# Patient Record
Sex: MALE | Race: BLACK OR AFRICAN AMERICAN | Employment: OTHER
[De-identification: names, ages, dates, MRNs, and addresses within clinical notes are randomized per-mention and may not be internally consistent; named-entity substitution may affect disease eponyms.]

---

## 2023-05-04 ENCOUNTER — APPOINTMENT (OUTPATIENT)
Facility: HOSPITAL | Age: 73
End: 2023-05-04
Payer: MEDICARE

## 2023-05-04 ENCOUNTER — HOSPITAL ENCOUNTER (INPATIENT)
Facility: HOSPITAL | Age: 73
LOS: 8 days | Discharge: NURSING, REHAB OR LONG-TERM CARE | End: 2023-05-12
Attending: EMERGENCY MEDICINE | Admitting: INTERNAL MEDICINE
Payer: MEDICARE

## 2023-05-04 DIAGNOSIS — I26.99 BILATERAL PULMONARY EMBOLISM (HCC): Primary | ICD-10-CM

## 2023-05-04 DIAGNOSIS — K57.32 DIVERTICULITIS OF COLON: ICD-10-CM

## 2023-05-04 DIAGNOSIS — N49.3 FOURNIER'S GANGRENE OF SCROTUM: ICD-10-CM

## 2023-05-04 DIAGNOSIS — D63.8 ANEMIA OF CHRONIC DISEASE: ICD-10-CM

## 2023-05-04 DIAGNOSIS — R77.8 ELEVATED TROPONIN I LEVEL: ICD-10-CM

## 2023-05-04 DIAGNOSIS — I48.20 CHRONIC ATRIAL FIBRILLATION (HCC): ICD-10-CM

## 2023-05-04 PROBLEM — B19.20 HEPATITIS C: Status: ACTIVE | Noted: 2023-05-04

## 2023-05-04 PROBLEM — N17.9 ACUTE KIDNEY INJURY SUPERIMPOSED ON CHRONIC KIDNEY DISEASE (HCC): Status: ACTIVE | Noted: 2023-05-04

## 2023-05-04 PROBLEM — K57.92 DIVERTICULITIS: Status: ACTIVE | Noted: 2023-05-04

## 2023-05-04 PROBLEM — N18.9 ACUTE KIDNEY INJURY SUPERIMPOSED ON CHRONIC KIDNEY DISEASE (HCC): Status: ACTIVE | Noted: 2023-05-04

## 2023-05-04 PROBLEM — E11.9 DIABETES MELLITUS, TYPE 2 (HCC): Status: ACTIVE | Noted: 2023-05-04

## 2023-05-04 PROBLEM — G47.33 OSA ON CPAP: Status: ACTIVE | Noted: 2023-05-04

## 2023-05-04 PROBLEM — N40.0 BPH (BENIGN PROSTATIC HYPERPLASIA): Status: ACTIVE | Noted: 2023-05-04

## 2023-05-04 PROBLEM — N18.9 CKD (CHRONIC KIDNEY DISEASE): Status: ACTIVE | Noted: 2023-05-04

## 2023-05-04 PROBLEM — D62 ABLA (ACUTE BLOOD LOSS ANEMIA): Status: ACTIVE | Noted: 2023-05-04

## 2023-05-04 PROBLEM — Z99.89 OSA ON CPAP: Status: ACTIVE | Noted: 2023-05-04

## 2023-05-04 PROBLEM — E78.5 HYPERLIPIDEMIA: Status: ACTIVE | Noted: 2023-05-04

## 2023-05-04 PROBLEM — I10 HYPERTENSION: Status: ACTIVE | Noted: 2023-05-04

## 2023-05-04 PROBLEM — I48.0 PAROXYSMAL ATRIAL FIBRILLATION (HCC): Status: ACTIVE | Noted: 2023-05-04

## 2023-05-04 PROBLEM — Z87.438 HISTORY OF FOURNIER'S GANGRENE: Status: ACTIVE | Noted: 2023-05-04

## 2023-05-04 LAB
ALBUMIN SERPL-MCNC: 2 G/DL (ref 3.4–5)
ALBUMIN/GLOB SERPL: 0.5 (ref 0.8–1.7)
ALP SERPL-CCNC: 62 U/L (ref 45–117)
ALT SERPL-CCNC: 19 U/L (ref 16–61)
ANION GAP SERPL CALC-SCNC: 6 MMOL/L (ref 3–18)
AST SERPL-CCNC: 22 U/L (ref 10–38)
BASOPHILS # BLD: 0 K/UL (ref 0–0.1)
BASOPHILS NFR BLD: 0 % (ref 0–2)
BILIRUB SERPL-MCNC: 0.5 MG/DL (ref 0.2–1)
BUN SERPL-MCNC: 18 MG/DL (ref 7–18)
BUN/CREAT SERPL: 11 (ref 12–20)
CALCIUM SERPL-MCNC: 8.1 MG/DL (ref 8.5–10.1)
CHLORIDE SERPL-SCNC: 112 MMOL/L (ref 100–111)
CO2 SERPL-SCNC: 21 MMOL/L (ref 21–32)
CREAT SERPL-MCNC: 1.71 MG/DL (ref 0.6–1.3)
DIFFERENTIAL METHOD BLD: ABNORMAL
EKG ATRIAL RATE: 79 BPM
EKG DIAGNOSIS: NORMAL
EKG P AXIS: 86 DEGREES
EKG P-R INTERVAL: 206 MS
EKG Q-T INTERVAL: 382 MS
EKG QRS DURATION: 102 MS
EKG QTC CALCULATION (BAZETT): 438 MS
EKG R AXIS: -43 DEGREES
EKG T AXIS: 43 DEGREES
EKG VENTRICULAR RATE: 79 BPM
EOSINOPHIL # BLD: 0.2 K/UL (ref 0–0.4)
EOSINOPHIL NFR BLD: 3 % (ref 0–5)
ERYTHROCYTE [DISTWIDTH] IN BLOOD BY AUTOMATED COUNT: 17.3 % (ref 11.6–14.5)
EST. AVERAGE GLUCOSE BLD GHB EST-MCNC: 126 MG/DL
GLOBULIN SER CALC-MCNC: 3.8 G/DL (ref 2–4)
GLUCOSE BLD STRIP.AUTO-MCNC: 159 MG/DL (ref 70–110)
GLUCOSE SERPL-MCNC: 206 MG/DL (ref 74–99)
HBA1C MFR BLD: 6 % (ref 4.2–5.6)
HCT VFR BLD AUTO: 22.5 % (ref 36–48)
HCT VFR BLD AUTO: 23 % (ref 36–48)
HGB BLD-MCNC: 6.6 G/DL (ref 13–16)
HGB BLD-MCNC: 7.1 G/DL (ref 13–16)
HISTORY CHECK: NORMAL
IMM GRANULOCYTES # BLD AUTO: 0.1 K/UL (ref 0–0.04)
IMM GRANULOCYTES NFR BLD AUTO: 1 % (ref 0–0.5)
INR PPP: 1.5 (ref 0.8–1.2)
LACTATE SERPL-SCNC: 1.5 MMOL/L (ref 0.4–2)
LIPASE SERPL-CCNC: 110 U/L (ref 73–393)
LYMPHOCYTES # BLD: 0.8 K/UL (ref 0.9–3.6)
LYMPHOCYTES NFR BLD: 10 % (ref 21–52)
MAGNESIUM SERPL-MCNC: 1.9 MG/DL (ref 1.6–2.6)
MCH RBC QN AUTO: 29.3 PG (ref 24–34)
MCHC RBC AUTO-ENTMCNC: 30.9 G/DL (ref 31–37)
MCV RBC AUTO: 95 FL (ref 78–100)
MONOCYTES # BLD: 0.6 K/UL (ref 0.05–1.2)
MONOCYTES NFR BLD: 8 % (ref 3–10)
NEUTS SEG # BLD: 5.7 K/UL (ref 1.8–8)
NEUTS SEG NFR BLD: 78 % (ref 40–73)
NRBC # BLD: 0 K/UL (ref 0–0.01)
NRBC BLD-RTO: 0 PER 100 WBC
NT PRO BNP: 277 PG/ML (ref 0–900)
NT PRO BNP: 630 PG/ML (ref 0–900)
PLATELET # BLD AUTO: 207 K/UL (ref 135–420)
PMV BLD AUTO: 10.6 FL (ref 9.2–11.8)
POTASSIUM SERPL-SCNC: 4.3 MMOL/L (ref 3.5–5.5)
PROT SERPL-MCNC: 5.8 G/DL (ref 6.4–8.2)
PROTHROMBIN TIME: 18.2 SEC (ref 11.5–15.2)
RBC # BLD AUTO: 2.42 M/UL (ref 4.35–5.65)
SODIUM SERPL-SCNC: 139 MMOL/L (ref 136–145)
TROPONIN I SERPL HS-MCNC: 74 NG/L (ref 0–78)
TROPONIN I SERPL HS-MCNC: 95 NG/L (ref 0–78)
TSH SERPL DL<=0.05 MIU/L-ACNC: 1.39 UIU/ML (ref 0.36–3.74)
WBC # BLD AUTO: 7.3 K/UL (ref 4.6–13.2)

## 2023-05-04 PROCEDURE — 71045 X-RAY EXAM CHEST 1 VIEW: CPT

## 2023-05-04 PROCEDURE — 80053 COMPREHEN METABOLIC PANEL: CPT

## 2023-05-04 PROCEDURE — 84443 ASSAY THYROID STIM HORMONE: CPT

## 2023-05-04 PROCEDURE — 86923 COMPATIBILITY TEST ELECTRIC: CPT

## 2023-05-04 PROCEDURE — 93005 ELECTROCARDIOGRAM TRACING: CPT | Performed by: EMERGENCY MEDICINE

## 2023-05-04 PROCEDURE — 83690 ASSAY OF LIPASE: CPT

## 2023-05-04 PROCEDURE — 93010 ELECTROCARDIOGRAM REPORT: CPT | Performed by: INTERNAL MEDICINE

## 2023-05-04 PROCEDURE — 86900 BLOOD TYPING SEROLOGIC ABO: CPT

## 2023-05-04 PROCEDURE — 99223 1ST HOSP IP/OBS HIGH 75: CPT

## 2023-05-04 PROCEDURE — 83605 ASSAY OF LACTIC ACID: CPT

## 2023-05-04 PROCEDURE — 96365 THER/PROPH/DIAG IV INF INIT: CPT

## 2023-05-04 PROCEDURE — 30233N1 TRANSFUSION OF NONAUTOLOGOUS RED BLOOD CELLS INTO PERIPHERAL VEIN, PERCUTANEOUS APPROACH: ICD-10-PCS | Performed by: HOSPITALIST

## 2023-05-04 PROCEDURE — 82962 GLUCOSE BLOOD TEST: CPT

## 2023-05-04 PROCEDURE — 86850 RBC ANTIBODY SCREEN: CPT

## 2023-05-04 PROCEDURE — 83880 ASSAY OF NATRIURETIC PEPTIDE: CPT

## 2023-05-04 PROCEDURE — 1100000000 HC RM PRIVATE

## 2023-05-04 PROCEDURE — 85018 HEMOGLOBIN: CPT

## 2023-05-04 PROCEDURE — 36415 COLL VENOUS BLD VENIPUNCTURE: CPT

## 2023-05-04 PROCEDURE — 83735 ASSAY OF MAGNESIUM: CPT

## 2023-05-04 PROCEDURE — 74174 CTA ABD&PLVS W/CONTRAST: CPT

## 2023-05-04 PROCEDURE — 2580000003 HC RX 258

## 2023-05-04 PROCEDURE — 83036 HEMOGLOBIN GLYCOSYLATED A1C: CPT

## 2023-05-04 PROCEDURE — 86901 BLOOD TYPING SEROLOGIC RH(D): CPT

## 2023-05-04 PROCEDURE — 2500000003 HC RX 250 WO HCPCS: Performed by: EMERGENCY MEDICINE

## 2023-05-04 PROCEDURE — 84484 ASSAY OF TROPONIN QUANT: CPT

## 2023-05-04 PROCEDURE — 2580000003 HC RX 258: Performed by: EMERGENCY MEDICINE

## 2023-05-04 PROCEDURE — 6360000004 HC RX CONTRAST MEDICATION: Performed by: EMERGENCY MEDICINE

## 2023-05-04 PROCEDURE — 85025 COMPLETE CBC W/AUTO DIFF WBC: CPT

## 2023-05-04 PROCEDURE — 85610 PROTHROMBIN TIME: CPT

## 2023-05-04 PROCEDURE — 99285 EMERGENCY DEPT VISIT HI MDM: CPT

## 2023-05-04 PROCEDURE — 6370000000 HC RX 637 (ALT 250 FOR IP)

## 2023-05-04 PROCEDURE — 85014 HEMATOCRIT: CPT

## 2023-05-04 RX ORDER — SODIUM CHLORIDE 0.9 % (FLUSH) 0.9 %
5-40 SYRINGE (ML) INJECTION EVERY 12 HOURS SCHEDULED
Status: DISCONTINUED | OUTPATIENT
Start: 2023-05-04 | End: 2023-05-13 | Stop reason: HOSPADM

## 2023-05-04 RX ORDER — ONDANSETRON 2 MG/ML
4 INJECTION INTRAMUSCULAR; INTRAVENOUS EVERY 6 HOURS PRN
Status: DISCONTINUED | OUTPATIENT
Start: 2023-05-04 | End: 2023-05-13 | Stop reason: HOSPADM

## 2023-05-04 RX ORDER — ONDANSETRON 4 MG/1
4 TABLET, ORALLY DISINTEGRATING ORAL EVERY 8 HOURS PRN
Status: DISCONTINUED | OUTPATIENT
Start: 2023-05-04 | End: 2023-05-13 | Stop reason: HOSPADM

## 2023-05-04 RX ORDER — INSULIN LISPRO 100 [IU]/ML
15 INJECTION, SOLUTION INTRAVENOUS; SUBCUTANEOUS
COMMUNITY
Start: 2023-04-28

## 2023-05-04 RX ORDER — TAMSULOSIN HYDROCHLORIDE 0.4 MG/1
0.4 CAPSULE ORAL DAILY
Status: DISCONTINUED | OUTPATIENT
Start: 2023-05-04 | End: 2023-05-13 | Stop reason: HOSPADM

## 2023-05-04 RX ORDER — FAMOTIDINE 20 MG/1
10 TABLET, FILM COATED ORAL 2 TIMES DAILY
Status: DISCONTINUED | OUTPATIENT
Start: 2023-05-04 | End: 2023-05-04

## 2023-05-04 RX ORDER — PIPERACILLIN SODIUM, TAZOBACTAM SODIUM 2; .25 G/10ML; G/10ML
3375 INJECTION, POWDER, LYOPHILIZED, FOR SOLUTION INTRAVENOUS EVERY 8 HOURS
Qty: 68 EACH | Refills: 0 | Status: ON HOLD | COMMUNITY
Start: 2023-04-27 | End: 2023-05-12 | Stop reason: HOSPADM

## 2023-05-04 RX ORDER — INSULIN GLARGINE 100 [IU]/ML
30 INJECTION, SOLUTION SUBCUTANEOUS NIGHTLY
Status: DISCONTINUED | OUTPATIENT
Start: 2023-05-04 | End: 2023-05-05

## 2023-05-04 RX ORDER — INSULIN LISPRO 100 [IU]/ML
0-8 INJECTION, SOLUTION INTRAVENOUS; SUBCUTANEOUS
Status: DISCONTINUED | OUTPATIENT
Start: 2023-05-04 | End: 2023-05-13 | Stop reason: HOSPADM

## 2023-05-04 RX ORDER — 0.9 % SODIUM CHLORIDE 0.9 %
1000 INTRAVENOUS SOLUTION INTRAVENOUS ONCE
Status: COMPLETED | OUTPATIENT
Start: 2023-05-04 | End: 2023-05-04

## 2023-05-04 RX ORDER — METRONIDAZOLE 500 MG/100ML
500 INJECTION, SOLUTION INTRAVENOUS
Status: COMPLETED | OUTPATIENT
Start: 2023-05-04 | End: 2023-05-04

## 2023-05-04 RX ORDER — ASPIRIN 81 MG/1
81 TABLET ORAL DAILY
Status: ON HOLD | COMMUNITY
End: 2023-05-12 | Stop reason: HOSPADM

## 2023-05-04 RX ORDER — ATORVASTATIN CALCIUM 40 MG/1
40 TABLET, FILM COATED ORAL NIGHTLY
Status: DISCONTINUED | OUTPATIENT
Start: 2023-05-04 | End: 2023-05-13 | Stop reason: HOSPADM

## 2023-05-04 RX ORDER — ACETAMINOPHEN 650 MG/1
650 SUPPOSITORY RECTAL EVERY 6 HOURS PRN
Status: DISCONTINUED | OUTPATIENT
Start: 2023-05-04 | End: 2023-05-13 | Stop reason: HOSPADM

## 2023-05-04 RX ORDER — INSULIN GLARGINE 100 [IU]/ML
45 INJECTION, SOLUTION SUBCUTANEOUS
COMMUNITY
Start: 2023-04-28

## 2023-05-04 RX ORDER — DILTIAZEM HYDROCHLORIDE 240 MG/1
240 CAPSULE, COATED, EXTENDED RELEASE ORAL DAILY
Status: DISCONTINUED | OUTPATIENT
Start: 2023-05-04 | End: 2023-05-05

## 2023-05-04 RX ORDER — TRAMADOL HYDROCHLORIDE 50 MG/1
25 TABLET ORAL 3 TIMES DAILY PRN
COMMUNITY
Start: 2023-04-28

## 2023-05-04 RX ORDER — ACETAMINOPHEN 325 MG/1
650 TABLET ORAL EVERY 6 HOURS PRN
Status: DISCONTINUED | OUTPATIENT
Start: 2023-05-04 | End: 2023-05-13 | Stop reason: HOSPADM

## 2023-05-04 RX ORDER — SODIUM CHLORIDE 9 MG/ML
INJECTION, SOLUTION INTRAVENOUS PRN
Status: DISCONTINUED | OUTPATIENT
Start: 2023-05-04 | End: 2023-05-13 | Stop reason: HOSPADM

## 2023-05-04 RX ORDER — APIXABAN 5 MG/1
5 TABLET, FILM COATED ORAL 2 TIMES DAILY
COMMUNITY
Start: 2023-02-13

## 2023-05-04 RX ORDER — OXYCODONE HYDROCHLORIDE AND ACETAMINOPHEN 5; 325 MG/1; MG/1
1 TABLET ORAL EVERY 4 HOURS PRN
Status: DISCONTINUED | OUTPATIENT
Start: 2023-05-04 | End: 2023-05-13 | Stop reason: HOSPADM

## 2023-05-04 RX ORDER — SODIUM CHLORIDE 0.9 % (FLUSH) 0.9 %
5-40 SYRINGE (ML) INJECTION PRN
Status: DISCONTINUED | OUTPATIENT
Start: 2023-05-04 | End: 2023-05-13 | Stop reason: HOSPADM

## 2023-05-04 RX ORDER — SODIUM CHLORIDE 9 MG/ML
INJECTION, SOLUTION INTRAVENOUS CONTINUOUS
Status: DISPENSED | OUTPATIENT
Start: 2023-05-04 | End: 2023-05-05

## 2023-05-04 RX ORDER — DILTIAZEM HYDROCHLORIDE 240 MG/1
240 CAPSULE, EXTENDED RELEASE ORAL DAILY
Status: ON HOLD | COMMUNITY
Start: 2016-06-29 | End: 2023-05-12 | Stop reason: HOSPADM

## 2023-05-04 RX ORDER — PIPERACILLIN SODIUM, TAZOBACTAM SODIUM 2; .25 G/10ML; G/10ML
3375 INJECTION, POWDER, LYOPHILIZED, FOR SOLUTION INTRAVENOUS EVERY 8 HOURS
Status: DISCONTINUED | OUTPATIENT
Start: 2023-05-04 | End: 2023-05-04 | Stop reason: SDUPTHER

## 2023-05-04 RX ORDER — LOSARTAN POTASSIUM 50 MG/1
100 TABLET ORAL DAILY
Status: DISCONTINUED | OUTPATIENT
Start: 2023-05-04 | End: 2023-05-13 | Stop reason: HOSPADM

## 2023-05-04 RX ORDER — DEXTROSE MONOHYDRATE 100 MG/ML
INJECTION, SOLUTION INTRAVENOUS CONTINUOUS PRN
Status: DISCONTINUED | OUTPATIENT
Start: 2023-05-04 | End: 2023-05-13 | Stop reason: HOSPADM

## 2023-05-04 RX ORDER — ATORVASTATIN CALCIUM 40 MG/1
40 TABLET, FILM COATED ORAL
COMMUNITY
Start: 2023-01-04

## 2023-05-04 RX ORDER — FEXOFENADINE HCL 180 MG/1
1 TABLET ORAL DAILY
COMMUNITY

## 2023-05-04 RX ORDER — POLYETHYLENE GLYCOL 3350 17 G/17G
17 POWDER, FOR SOLUTION ORAL DAILY PRN
Status: DISCONTINUED | OUTPATIENT
Start: 2023-05-04 | End: 2023-05-13 | Stop reason: HOSPADM

## 2023-05-04 RX ORDER — NALOXONE HYDROCHLORIDE 0.4 MG/ML
0.4 INJECTION, SOLUTION INTRAMUSCULAR; INTRAVENOUS; SUBCUTANEOUS PRN
Status: DISCONTINUED | OUTPATIENT
Start: 2023-05-04 | End: 2023-05-13 | Stop reason: HOSPADM

## 2023-05-04 RX ORDER — LOSARTAN POTASSIUM 100 MG/1
1 TABLET ORAL DAILY
COMMUNITY
Start: 2009-02-20

## 2023-05-04 RX ORDER — ERGOCALCIFEROL 1.25 MG/1
50000 CAPSULE ORAL WEEKLY
Status: ON HOLD | COMMUNITY
Start: 2016-05-07 | End: 2023-05-12 | Stop reason: HOSPADM

## 2023-05-04 RX ORDER — FLUTICASONE PROPIONATE 50 MCG
1 SPRAY, SUSPENSION (ML) NASAL DAILY
COMMUNITY
Start: 2011-09-08

## 2023-05-04 RX ORDER — ACETAMINOPHEN 325 MG/1
650 TABLET ORAL EVERY 4 HOURS PRN
COMMUNITY

## 2023-05-04 RX ORDER — FAMOTIDINE 20 MG/1
10 TABLET, FILM COATED ORAL 2 TIMES DAILY
COMMUNITY

## 2023-05-04 RX ORDER — TAMSULOSIN HYDROCHLORIDE 0.4 MG/1
1 CAPSULE ORAL DAILY
COMMUNITY
Start: 2016-06-21

## 2023-05-04 RX ADMIN — TAMSULOSIN HYDROCHLORIDE 0.4 MG: 0.4 CAPSULE ORAL at 23:37

## 2023-05-04 RX ADMIN — INSULIN GLARGINE 30 UNITS: 100 INJECTION, SOLUTION SUBCUTANEOUS at 23:38

## 2023-05-04 RX ADMIN — ATORVASTATIN CALCIUM 40 MG: 40 TABLET, FILM COATED ORAL at 23:38

## 2023-05-04 RX ADMIN — SODIUM CHLORIDE 1000 ML: 9 INJECTION, SOLUTION INTRAVENOUS at 09:18

## 2023-05-04 RX ADMIN — DILTIAZEM HYDROCHLORIDE 240 MG: 240 CAPSULE, COATED, EXTENDED RELEASE ORAL at 23:38

## 2023-05-04 RX ADMIN — SODIUM CHLORIDE: 9 INJECTION, SOLUTION INTRAVENOUS at 23:37

## 2023-05-04 RX ADMIN — LOSARTAN POTASSIUM 100 MG: 50 TABLET, FILM COATED ORAL at 23:38

## 2023-05-04 RX ADMIN — IOPAMIDOL 80 ML: 755 INJECTION, SOLUTION INTRAVENOUS at 08:49

## 2023-05-04 RX ADMIN — METRONIDAZOLE 500 MG: 500 INJECTION, SOLUTION INTRAVENOUS at 12:15

## 2023-05-04 ASSESSMENT — PAIN DESCRIPTION - LOCATION: LOCATION: ABDOMEN

## 2023-05-04 ASSESSMENT — ENCOUNTER SYMPTOMS
EYE PAIN: 0
WHEEZING: 0
RHINORRHEA: 0
ANAL BLEEDING: 1
STRIDOR: 0
SORE THROAT: 0
CONSTIPATION: 0
SHORTNESS OF BREATH: 0
BACK PAIN: 0
PHOTOPHOBIA: 0
ABDOMINAL PAIN: 0
DIARRHEA: 0
COUGH: 0
BLOOD IN STOOL: 0
CHEST TIGHTNESS: 0
EYE REDNESS: 0
VOMITING: 0
NAUSEA: 0
TROUBLE SWALLOWING: 0

## 2023-05-04 ASSESSMENT — PAIN SCALES - GENERAL: PAINLEVEL_OUTOF10: 3

## 2023-05-04 ASSESSMENT — PAIN DESCRIPTION - ORIENTATION: ORIENTATION: LEFT;LOWER

## 2023-05-04 ASSESSMENT — PAIN - FUNCTIONAL ASSESSMENT: PAIN_FUNCTIONAL_ASSESSMENT: 0-10

## 2023-05-05 ENCOUNTER — HOSPITAL ENCOUNTER (INPATIENT)
Facility: HOSPITAL | Age: 73
Discharge: HOME | End: 2023-05-08
Payer: MEDICARE

## 2023-05-05 ENCOUNTER — APPOINTMENT (OUTPATIENT)
Facility: HOSPITAL | Age: 73
End: 2023-05-05
Payer: MEDICARE

## 2023-05-05 VITALS
RESPIRATION RATE: 20 BRPM | SYSTOLIC BLOOD PRESSURE: 117 MMHG | HEART RATE: 74 BPM | DIASTOLIC BLOOD PRESSURE: 57 MMHG | OXYGEN SATURATION: 97 %

## 2023-05-05 PROBLEM — R53.81 DEBILITY: Status: ACTIVE | Noted: 2023-05-05

## 2023-05-05 PROBLEM — D63.8 ANEMIA OF CHRONIC DISEASE: Status: ACTIVE | Noted: 2023-05-05

## 2023-05-05 PROBLEM — Z71.89 ADVANCED CARE PLANNING/COUNSELING DISCUSSION: Status: ACTIVE | Noted: 2023-05-05

## 2023-05-05 PROBLEM — I48.20 CHRONIC ATRIAL FIBRILLATION (HCC): Status: ACTIVE | Noted: 2023-05-05

## 2023-05-05 PROBLEM — Z51.5 ENCOUNTER FOR PALLIATIVE CARE: Status: ACTIVE | Noted: 2023-05-05

## 2023-05-05 PROBLEM — K57.32 DIVERTICULITIS OF COLON: Status: ACTIVE | Noted: 2023-05-05

## 2023-05-05 PROBLEM — N49.3 FOURNIER'S GANGRENE OF SCROTUM: Status: ACTIVE | Noted: 2023-05-05

## 2023-05-05 LAB
ANION GAP SERPL CALC-SCNC: 9 MMOL/L (ref 3–18)
BASOPHILS # BLD: 0 K/UL (ref 0–0.1)
BASOPHILS NFR BLD: 1 % (ref 0–2)
BUN SERPL-MCNC: 15 MG/DL (ref 7–18)
BUN/CREAT SERPL: 11 (ref 12–20)
CALCIUM SERPL-MCNC: 8.4 MG/DL (ref 8.5–10.1)
CHLORIDE SERPL-SCNC: 114 MMOL/L (ref 100–111)
CO2 SERPL-SCNC: 18 MMOL/L (ref 21–32)
CREAT SERPL-MCNC: 1.4 MG/DL (ref 0.6–1.3)
DIFFERENTIAL METHOD BLD: ABNORMAL
ECHO BSA: 2.8 M2
EOSINOPHIL # BLD: 0.3 K/UL (ref 0–0.4)
EOSINOPHIL NFR BLD: 6 % (ref 0–5)
ERYTHROCYTE [DISTWIDTH] IN BLOOD BY AUTOMATED COUNT: 17.2 % (ref 11.6–14.5)
FERRITIN SERPL-MCNC: 132 NG/ML (ref 8–388)
GLUCOSE BLD STRIP.AUTO-MCNC: 146 MG/DL (ref 70–110)
GLUCOSE BLD STRIP.AUTO-MCNC: 150 MG/DL (ref 70–110)
GLUCOSE BLD STRIP.AUTO-MCNC: 172 MG/DL (ref 70–110)
GLUCOSE BLD STRIP.AUTO-MCNC: 178 MG/DL (ref 70–110)
GLUCOSE SERPL-MCNC: 143 MG/DL (ref 74–99)
HCT VFR BLD AUTO: 21.6 % (ref 36–48)
HCT VFR BLD AUTO: 24.5 % (ref 36–48)
HEMOCCULT STL QL: POSITIVE
HGB BLD-MCNC: 6.5 G/DL (ref 13–16)
HGB BLD-MCNC: 7.4 G/DL (ref 13–16)
HISTORY CHECK: NORMAL
IMM GRANULOCYTES # BLD AUTO: 0 K/UL (ref 0–0.04)
IMM GRANULOCYTES NFR BLD AUTO: 1 % (ref 0–0.5)
IRON SATN MFR SERPL: 23 % (ref 20–50)
IRON SERPL-MCNC: 43 UG/DL (ref 50–175)
LYMPHOCYTES # BLD: 0.7 K/UL (ref 0.9–3.6)
LYMPHOCYTES NFR BLD: 16 % (ref 21–52)
MCH RBC QN AUTO: 29.1 PG (ref 24–34)
MCHC RBC AUTO-ENTMCNC: 30.1 G/DL (ref 31–37)
MCV RBC AUTO: 96.9 FL (ref 78–100)
MONOCYTES # BLD: 0.6 K/UL (ref 0.05–1.2)
MONOCYTES NFR BLD: 13 % (ref 3–10)
NEUTS SEG # BLD: 3 K/UL (ref 1.8–8)
NEUTS SEG NFR BLD: 64 % (ref 40–73)
NRBC # BLD: 0 K/UL (ref 0–0.01)
NRBC BLD-RTO: 0 PER 100 WBC
PLATELET # BLD AUTO: 151 K/UL (ref 135–420)
PMV BLD AUTO: 10 FL (ref 9.2–11.8)
POTASSIUM SERPL-SCNC: 4.2 MMOL/L (ref 3.5–5.5)
RBC # BLD AUTO: 2.23 M/UL (ref 4.35–5.65)
SODIUM SERPL-SCNC: 141 MMOL/L (ref 136–145)
TIBC SERPL-MCNC: 190 UG/DL (ref 250–450)
WBC # BLD AUTO: 4.6 K/UL (ref 4.6–13.2)

## 2023-05-05 PROCEDURE — 71275 CT ANGIOGRAPHY CHEST: CPT

## 2023-05-05 PROCEDURE — 80048 BASIC METABOLIC PNL TOTAL CA: CPT

## 2023-05-05 PROCEDURE — 6360000002 HC RX W HCPCS: Performed by: INTERNAL MEDICINE

## 2023-05-05 PROCEDURE — 37191 INS ENDOVAS VENA CAVA FILTR: CPT

## 2023-05-05 PROCEDURE — 83550 IRON BINDING TEST: CPT

## 2023-05-05 PROCEDURE — 6360000002 HC RX W HCPCS

## 2023-05-05 PROCEDURE — 85025 COMPLETE CBC W/AUTO DIFF WBC: CPT

## 2023-05-05 PROCEDURE — 6370000000 HC RX 637 (ALT 250 FOR IP)

## 2023-05-05 PROCEDURE — 85014 HEMATOCRIT: CPT

## 2023-05-05 PROCEDURE — 99223 1ST HOSP IP/OBS HIGH 75: CPT | Performed by: NURSE PRACTITIONER

## 2023-05-05 PROCEDURE — 6360000004 HC RX CONTRAST MEDICATION: Performed by: INTERNAL MEDICINE

## 2023-05-05 PROCEDURE — 1100000000 HC RM PRIVATE

## 2023-05-05 PROCEDURE — 06H03DZ INSERTION OF INTRALUMINAL DEVICE INTO INFERIOR VENA CAVA, PERCUTANEOUS APPROACH: ICD-10-PCS | Performed by: RADIOLOGY

## 2023-05-05 PROCEDURE — 99233 SBSQ HOSP IP/OBS HIGH 50: CPT | Performed by: INTERNAL MEDICINE

## 2023-05-05 PROCEDURE — P9016 RBC LEUKOCYTES REDUCED: HCPCS

## 2023-05-05 PROCEDURE — 36430 TRANSFUSION BLD/BLD COMPNT: CPT

## 2023-05-05 PROCEDURE — 82962 GLUCOSE BLOOD TEST: CPT

## 2023-05-05 PROCEDURE — 93970 EXTREMITY STUDY: CPT

## 2023-05-05 PROCEDURE — C9113 INJ PANTOPRAZOLE SODIUM, VIA: HCPCS

## 2023-05-05 PROCEDURE — 82272 OCCULT BLD FECES 1-3 TESTS: CPT

## 2023-05-05 PROCEDURE — 2580000003 HC RX 258

## 2023-05-05 PROCEDURE — A4216 STERILE WATER/SALINE, 10 ML: HCPCS

## 2023-05-05 PROCEDURE — 2700000000 HC OXYGEN THERAPY PER DAY

## 2023-05-05 PROCEDURE — 36415 COLL VENOUS BLD VENIPUNCTURE: CPT

## 2023-05-05 PROCEDURE — 82728 ASSAY OF FERRITIN: CPT

## 2023-05-05 PROCEDURE — 83540 ASSAY OF IRON: CPT

## 2023-05-05 PROCEDURE — 85018 HEMOGLOBIN: CPT

## 2023-05-05 PROCEDURE — 6370000000 HC RX 637 (ALT 250 FOR IP): Performed by: INTERNAL MEDICINE

## 2023-05-05 PROCEDURE — 2500000003 HC RX 250 WO HCPCS: Performed by: RADIOLOGY

## 2023-05-05 RX ORDER — DILTIAZEM HYDROCHLORIDE 120 MG/1
120 CAPSULE, COATED, EXTENDED RELEASE ORAL DAILY
Status: DISCONTINUED | OUTPATIENT
Start: 2023-05-06 | End: 2023-05-13 | Stop reason: HOSPADM

## 2023-05-05 RX ORDER — LIDOCAINE HYDROCHLORIDE 10 MG/ML
INJECTION, SOLUTION EPIDURAL; INFILTRATION; INTRACAUDAL; PERINEURAL PRN
Status: DISCONTINUED | OUTPATIENT
Start: 2023-05-05 | End: 2023-05-09 | Stop reason: HOSPADM

## 2023-05-05 RX ORDER — INSULIN GLARGINE 100 [IU]/ML
15 INJECTION, SOLUTION SUBCUTANEOUS NIGHTLY
Status: DISCONTINUED | OUTPATIENT
Start: 2023-05-05 | End: 2023-05-13 | Stop reason: HOSPADM

## 2023-05-05 RX ORDER — SODIUM CHLORIDE 9 MG/ML
INJECTION, SOLUTION INTRAVENOUS PRN
Status: DISCONTINUED | OUTPATIENT
Start: 2023-05-05 | End: 2023-05-13 | Stop reason: HOSPADM

## 2023-05-05 RX ADMIN — TAMSULOSIN HYDROCHLORIDE 0.4 MG: 0.4 CAPSULE ORAL at 08:08

## 2023-05-05 RX ADMIN — SODIUM CHLORIDE, PRESERVATIVE FREE 40 MG: 5 INJECTION INTRAVENOUS at 08:08

## 2023-05-05 RX ADMIN — SODIUM CHLORIDE, PRESERVATIVE FREE 10 ML: 5 INJECTION INTRAVENOUS at 08:08

## 2023-05-05 RX ADMIN — INSULIN LISPRO 2 UNITS: 100 INJECTION, SOLUTION INTRAVENOUS; SUBCUTANEOUS at 11:28

## 2023-05-05 RX ADMIN — OXYCODONE HYDROCHLORIDE AND ACETAMINOPHEN 1 TABLET: 5; 325 TABLET ORAL at 11:10

## 2023-05-05 RX ADMIN — ATORVASTATIN CALCIUM 40 MG: 40 TABLET, FILM COATED ORAL at 22:51

## 2023-05-05 RX ADMIN — INSULIN GLARGINE 15 UNITS: 100 INJECTION, SOLUTION SUBCUTANEOUS at 22:52

## 2023-05-05 RX ADMIN — DILTIAZEM HYDROCHLORIDE 240 MG: 240 CAPSULE, COATED, EXTENDED RELEASE ORAL at 08:08

## 2023-05-05 RX ADMIN — PIPERACILLIN AND TAZOBACTAM 3375 MG: 3; .375 INJECTION, POWDER, LYOPHILIZED, FOR SOLUTION INTRAVENOUS at 00:03

## 2023-05-05 RX ADMIN — IOPAMIDOL 80 ML: 755 INJECTION, SOLUTION INTRAVENOUS at 12:35

## 2023-05-05 RX ADMIN — SODIUM CHLORIDE, PRESERVATIVE FREE 40 MG: 5 INJECTION INTRAVENOUS at 00:04

## 2023-05-05 RX ADMIN — PIPERACILLIN AND TAZOBACTAM 3375 MG: 3; .375 INJECTION, POWDER, LYOPHILIZED, FOR SOLUTION INTRAVENOUS at 08:00

## 2023-05-05 RX ADMIN — INSULIN LISPRO 2 UNITS: 100 INJECTION, SOLUTION INTRAVENOUS; SUBCUTANEOUS at 00:10

## 2023-05-05 RX ADMIN — INSULIN LISPRO 2 UNITS: 100 INJECTION, SOLUTION INTRAVENOUS; SUBCUTANEOUS at 22:52

## 2023-05-05 RX ADMIN — PIPERACILLIN AND TAZOBACTAM 3375 MG: 3; .375 INJECTION, POWDER, LYOPHILIZED, FOR SOLUTION INTRAVENOUS at 17:22

## 2023-05-05 RX ADMIN — LOSARTAN POTASSIUM 100 MG: 50 TABLET, FILM COATED ORAL at 08:08

## 2023-05-05 RX ADMIN — LIDOCAINE HYDROCHLORIDE 5 ML: 10 INJECTION, SOLUTION EPIDURAL; INFILTRATION; INTRACAUDAL; PERINEURAL at 15:50

## 2023-05-05 RX ADMIN — OXYCODONE HYDROCHLORIDE AND ACETAMINOPHEN 1 TABLET: 5; 325 TABLET ORAL at 18:38

## 2023-05-05 RX ADMIN — SODIUM CHLORIDE, PRESERVATIVE FREE 10 ML: 5 INJECTION INTRAVENOUS at 22:51

## 2023-05-05 RX ADMIN — IRON SUCROSE 200 MG: 20 INJECTION, SOLUTION INTRAVENOUS at 11:11

## 2023-05-05 RX ADMIN — SODIUM CHLORIDE, PRESERVATIVE FREE 10 ML: 5 INJECTION INTRAVENOUS at 00:02

## 2023-05-05 RX ADMIN — SODIUM CHLORIDE, PRESERVATIVE FREE 40 MG: 5 INJECTION INTRAVENOUS at 22:51

## 2023-05-05 ASSESSMENT — PAIN DESCRIPTION - LOCATION
LOCATION: SCROTUM
LOCATION: SCROTUM;LEG

## 2023-05-05 ASSESSMENT — PAIN DESCRIPTION - DESCRIPTORS
DESCRIPTORS: ACHING;DISCOMFORT
DESCRIPTORS: ACHING

## 2023-05-05 ASSESSMENT — PAIN SCALES - GENERAL
PAINLEVEL_OUTOF10: 3
PAINLEVEL_OUTOF10: 8
PAINLEVEL_OUTOF10: 7

## 2023-05-06 LAB
ABO + RH BLD: NORMAL
ANION GAP SERPL CALC-SCNC: 4 MMOL/L (ref 3–18)
BASOPHILS # BLD: 0 K/UL (ref 0–0.1)
BASOPHILS NFR BLD: 0 % (ref 0–2)
BLD PROD TYP BPU: NORMAL
BLD PROD TYP BPU: NORMAL
BLOOD BANK DISPENSE STATUS: NORMAL
BLOOD BANK DISPENSE STATUS: NORMAL
BLOOD GROUP ANTIBODIES SERPL: NORMAL
BPU ID: NORMAL
BPU ID: NORMAL
BUN SERPL-MCNC: 14 MG/DL (ref 7–18)
BUN/CREAT SERPL: 10 (ref 12–20)
CALCIUM SERPL-MCNC: 8.1 MG/DL (ref 8.5–10.1)
CALLED TO: NORMAL
CALLED TO:: NORMAL
CHLORIDE SERPL-SCNC: 112 MMOL/L (ref 100–111)
CO2 SERPL-SCNC: 24 MMOL/L (ref 21–32)
CREAT SERPL-MCNC: 1.35 MG/DL (ref 0.6–1.3)
CROSSMATCH RESULT: NORMAL
CROSSMATCH RESULT: NORMAL
DIFFERENTIAL METHOD BLD: ABNORMAL
EOSINOPHIL # BLD: 0.4 K/UL (ref 0–0.4)
EOSINOPHIL NFR BLD: 9 % (ref 0–5)
ERYTHROCYTE [DISTWIDTH] IN BLOOD BY AUTOMATED COUNT: 17.2 % (ref 11.6–14.5)
GLUCOSE BLD STRIP.AUTO-MCNC: 115 MG/DL (ref 70–110)
GLUCOSE BLD STRIP.AUTO-MCNC: 128 MG/DL (ref 70–110)
GLUCOSE BLD STRIP.AUTO-MCNC: 130 MG/DL (ref 70–110)
GLUCOSE BLD STRIP.AUTO-MCNC: 134 MG/DL (ref 70–110)
GLUCOSE SERPL-MCNC: 114 MG/DL (ref 74–99)
HCT VFR BLD AUTO: 22.1 % (ref 36–48)
HCT VFR BLD AUTO: 22.7 % (ref 36–48)
HCT VFR BLD AUTO: 24.1 % (ref 36–48)
HCT VFR BLD AUTO: 27.6 % (ref 36–48)
HGB BLD-MCNC: 6.9 G/DL (ref 13–16)
HGB BLD-MCNC: 7 G/DL (ref 13–16)
HGB BLD-MCNC: 7.4 G/DL (ref 13–16)
HGB BLD-MCNC: 8.6 G/DL (ref 13–16)
IMM GRANULOCYTES # BLD AUTO: 0 K/UL (ref 0–0.04)
IMM GRANULOCYTES NFR BLD AUTO: 1 % (ref 0–0.5)
LYMPHOCYTES # BLD: 0.6 K/UL (ref 0.9–3.6)
LYMPHOCYTES NFR BLD: 14 % (ref 21–52)
MCH RBC QN AUTO: 29.2 PG (ref 24–34)
MCHC RBC AUTO-ENTMCNC: 31.2 G/DL (ref 31–37)
MCV RBC AUTO: 93.6 FL (ref 78–100)
MONOCYTES # BLD: 0.5 K/UL (ref 0.05–1.2)
MONOCYTES NFR BLD: 12 % (ref 3–10)
NEUTS SEG # BLD: 2.6 K/UL (ref 1.8–8)
NEUTS SEG NFR BLD: 65 % (ref 40–73)
NRBC # BLD: 0 K/UL (ref 0–0.01)
NRBC BLD-RTO: 0 PER 100 WBC
PLATELET # BLD AUTO: 149 K/UL (ref 135–420)
PMV BLD AUTO: 10.4 FL (ref 9.2–11.8)
POTASSIUM SERPL-SCNC: 3.7 MMOL/L (ref 3.5–5.5)
RBC # BLD AUTO: 2.36 M/UL (ref 4.35–5.65)
SODIUM SERPL-SCNC: 140 MMOL/L (ref 136–145)
SPECIMEN EXP DATE BLD: NORMAL
UNIT DIVISION: 0
UNIT DIVISION: 0
WBC # BLD AUTO: 4.1 K/UL (ref 4.6–13.2)

## 2023-05-06 PROCEDURE — 97166 OT EVAL MOD COMPLEX 45 MIN: CPT

## 2023-05-06 PROCEDURE — 97530 THERAPEUTIC ACTIVITIES: CPT

## 2023-05-06 PROCEDURE — 82962 GLUCOSE BLOOD TEST: CPT

## 2023-05-06 PROCEDURE — 6370000000 HC RX 637 (ALT 250 FOR IP): Performed by: INTERNAL MEDICINE

## 2023-05-06 PROCEDURE — 94761 N-INVAS EAR/PLS OXIMETRY MLT: CPT

## 2023-05-06 PROCEDURE — 1100000000 HC RM PRIVATE

## 2023-05-06 PROCEDURE — 85018 HEMOGLOBIN: CPT

## 2023-05-06 PROCEDURE — 6370000000 HC RX 637 (ALT 250 FOR IP)

## 2023-05-06 PROCEDURE — 85025 COMPLETE CBC W/AUTO DIFF WBC: CPT

## 2023-05-06 PROCEDURE — 2580000003 HC RX 258

## 2023-05-06 PROCEDURE — 97161 PT EVAL LOW COMPLEX 20 MIN: CPT

## 2023-05-06 PROCEDURE — 80048 BASIC METABOLIC PNL TOTAL CA: CPT

## 2023-05-06 PROCEDURE — A4216 STERILE WATER/SALINE, 10 ML: HCPCS

## 2023-05-06 PROCEDURE — 99232 SBSQ HOSP IP/OBS MODERATE 35: CPT | Performed by: FAMILY MEDICINE

## 2023-05-06 PROCEDURE — 6360000002 HC RX W HCPCS

## 2023-05-06 PROCEDURE — 6360000002 HC RX W HCPCS: Performed by: INTERNAL MEDICINE

## 2023-05-06 PROCEDURE — 97535 SELF CARE MNGMENT TRAINING: CPT

## 2023-05-06 PROCEDURE — C9113 INJ PANTOPRAZOLE SODIUM, VIA: HCPCS

## 2023-05-06 PROCEDURE — 36415 COLL VENOUS BLD VENIPUNCTURE: CPT

## 2023-05-06 PROCEDURE — 85014 HEMATOCRIT: CPT

## 2023-05-06 RX ADMIN — INSULIN GLARGINE 15 UNITS: 100 INJECTION, SOLUTION SUBCUTANEOUS at 21:37

## 2023-05-06 RX ADMIN — SODIUM CHLORIDE, PRESERVATIVE FREE 10 ML: 5 INJECTION INTRAVENOUS at 21:39

## 2023-05-06 RX ADMIN — IRON SUCROSE 200 MG: 20 INJECTION, SOLUTION INTRAVENOUS at 10:41

## 2023-05-06 RX ADMIN — SODIUM CHLORIDE, PRESERVATIVE FREE 10 ML: 5 INJECTION INTRAVENOUS at 10:45

## 2023-05-06 RX ADMIN — SODIUM CHLORIDE, PRESERVATIVE FREE 40 MG: 5 INJECTION INTRAVENOUS at 21:36

## 2023-05-06 RX ADMIN — PIPERACILLIN AND TAZOBACTAM 3375 MG: 3; .375 INJECTION, POWDER, LYOPHILIZED, FOR SOLUTION INTRAVENOUS at 00:18

## 2023-05-06 RX ADMIN — DILTIAZEM HYDROCHLORIDE 120 MG: 120 CAPSULE, COATED, EXTENDED RELEASE ORAL at 10:42

## 2023-05-06 RX ADMIN — PIPERACILLIN AND TAZOBACTAM 3375 MG: 3; .375 INJECTION, POWDER, LYOPHILIZED, FOR SOLUTION INTRAVENOUS at 10:42

## 2023-05-06 RX ADMIN — SODIUM CHLORIDE, PRESERVATIVE FREE 40 MG: 5 INJECTION INTRAVENOUS at 10:42

## 2023-05-06 RX ADMIN — PIPERACILLIN AND TAZOBACTAM 3375 MG: 3; .375 INJECTION, POWDER, LYOPHILIZED, FOR SOLUTION INTRAVENOUS at 23:56

## 2023-05-06 RX ADMIN — TAMSULOSIN HYDROCHLORIDE 0.4 MG: 0.4 CAPSULE ORAL at 10:42

## 2023-05-06 RX ADMIN — ATORVASTATIN CALCIUM 40 MG: 40 TABLET, FILM COATED ORAL at 21:36

## 2023-05-06 RX ADMIN — PIPERACILLIN AND TAZOBACTAM 3375 MG: 3; .375 INJECTION, POWDER, LYOPHILIZED, FOR SOLUTION INTRAVENOUS at 16:00

## 2023-05-06 ASSESSMENT — PAIN SCALES - GENERAL: PAINLEVEL_OUTOF10: 4

## 2023-05-06 ASSESSMENT — PAIN DESCRIPTION - LOCATION: LOCATION: GROIN

## 2023-05-06 ASSESSMENT — PAIN - FUNCTIONAL ASSESSMENT: PAIN_FUNCTIONAL_ASSESSMENT: PREVENTS OR INTERFERES SOME ACTIVE ACTIVITIES AND ADLS

## 2023-05-06 ASSESSMENT — PAIN DESCRIPTION - DESCRIPTORS: DESCRIPTORS: DISCOMFORT

## 2023-05-06 ASSESSMENT — PAIN DESCRIPTION - ORIENTATION: ORIENTATION: LEFT;LOWER

## 2023-05-07 LAB
ANION GAP SERPL CALC-SCNC: 3 MMOL/L (ref 3–18)
BASOPHILS # BLD: 0 K/UL (ref 0–0.1)
BASOPHILS # BLD: 0 K/UL (ref 0–0.1)
BASOPHILS NFR BLD: 0 % (ref 0–2)
BASOPHILS NFR BLD: 1 % (ref 0–2)
BUN SERPL-MCNC: 8 MG/DL (ref 7–18)
BUN/CREAT SERPL: 6 (ref 12–20)
CALCIUM SERPL-MCNC: 8.4 MG/DL (ref 8.5–10.1)
CHLORIDE SERPL-SCNC: 113 MMOL/L (ref 100–111)
CO2 SERPL-SCNC: 25 MMOL/L (ref 21–32)
CREAT SERPL-MCNC: 1.3 MG/DL (ref 0.6–1.3)
DIFFERENTIAL METHOD BLD: ABNORMAL
DIFFERENTIAL METHOD BLD: ABNORMAL
EOSINOPHIL # BLD: 0.2 K/UL (ref 0–0.4)
EOSINOPHIL # BLD: 0.3 K/UL (ref 0–0.4)
EOSINOPHIL NFR BLD: 5 % (ref 0–5)
EOSINOPHIL NFR BLD: 8 % (ref 0–5)
ERYTHROCYTE [DISTWIDTH] IN BLOOD BY AUTOMATED COUNT: 16.9 % (ref 11.6–14.5)
ERYTHROCYTE [DISTWIDTH] IN BLOOD BY AUTOMATED COUNT: 17.1 % (ref 11.6–14.5)
GLUCOSE BLD STRIP.AUTO-MCNC: 119 MG/DL (ref 70–110)
GLUCOSE BLD STRIP.AUTO-MCNC: 120 MG/DL (ref 70–110)
GLUCOSE BLD STRIP.AUTO-MCNC: 126 MG/DL (ref 70–110)
GLUCOSE BLD STRIP.AUTO-MCNC: 128 MG/DL (ref 70–110)
GLUCOSE SERPL-MCNC: 93 MG/DL (ref 74–99)
HCT VFR BLD AUTO: 22.1 % (ref 36–48)
HCT VFR BLD AUTO: 23.1 % (ref 36–48)
HCT VFR BLD AUTO: 24.7 % (ref 36–48)
HGB BLD-MCNC: 6.8 G/DL (ref 13–16)
HGB BLD-MCNC: 7.1 G/DL (ref 13–16)
HGB BLD-MCNC: 7.5 G/DL (ref 13–16)
IMM GRANULOCYTES # BLD AUTO: 0 K/UL (ref 0–0.04)
IMM GRANULOCYTES # BLD AUTO: 0 K/UL (ref 0–0.04)
IMM GRANULOCYTES NFR BLD AUTO: 1 % (ref 0–0.5)
IMM GRANULOCYTES NFR BLD AUTO: 1 % (ref 0–0.5)
LYMPHOCYTES # BLD: 0.5 K/UL (ref 0.9–3.6)
LYMPHOCYTES # BLD: 0.5 K/UL (ref 0.9–3.6)
LYMPHOCYTES NFR BLD: 14 % (ref 21–52)
LYMPHOCYTES NFR BLD: 15 % (ref 21–52)
MAGNESIUM SERPL-MCNC: 1.8 MG/DL (ref 1.6–2.6)
MCH RBC QN AUTO: 29.3 PG (ref 24–34)
MCH RBC QN AUTO: 29.4 PG (ref 24–34)
MCHC RBC AUTO-ENTMCNC: 30.4 G/DL (ref 31–37)
MCHC RBC AUTO-ENTMCNC: 30.8 G/DL (ref 31–37)
MCV RBC AUTO: 95.7 FL (ref 78–100)
MCV RBC AUTO: 96.5 FL (ref 78–100)
MONOCYTES # BLD: 0.4 K/UL (ref 0.05–1.2)
MONOCYTES # BLD: 0.4 K/UL (ref 0.05–1.2)
MONOCYTES NFR BLD: 11 % (ref 3–10)
MONOCYTES NFR BLD: 11 % (ref 3–10)
NEUTS SEG # BLD: 2.3 K/UL (ref 1.8–8)
NEUTS SEG # BLD: 2.4 K/UL (ref 1.8–8)
NEUTS SEG NFR BLD: 65 % (ref 40–73)
NEUTS SEG NFR BLD: 68 % (ref 40–73)
NRBC # BLD: 0 K/UL (ref 0–0.01)
NRBC # BLD: 0 K/UL (ref 0–0.01)
NRBC BLD-RTO: 0 PER 100 WBC
NRBC BLD-RTO: 0 PER 100 WBC
PLATELET # BLD AUTO: 151 K/UL (ref 135–420)
PLATELET # BLD AUTO: 161 K/UL (ref 135–420)
PMV BLD AUTO: 9.7 FL (ref 9.2–11.8)
PMV BLD AUTO: 9.9 FL (ref 9.2–11.8)
POTASSIUM SERPL-SCNC: 3.5 MMOL/L (ref 3.5–5.5)
RBC # BLD AUTO: 2.31 M/UL (ref 4.35–5.65)
RBC # BLD AUTO: 2.56 M/UL (ref 4.35–5.65)
SODIUM SERPL-SCNC: 141 MMOL/L (ref 136–145)
WBC # BLD AUTO: 3.6 K/UL (ref 4.6–13.2)
WBC # BLD AUTO: 3.6 K/UL (ref 4.6–13.2)

## 2023-05-07 PROCEDURE — 99232 SBSQ HOSP IP/OBS MODERATE 35: CPT | Performed by: FAMILY MEDICINE

## 2023-05-07 PROCEDURE — 6370000000 HC RX 637 (ALT 250 FOR IP): Performed by: INTERNAL MEDICINE

## 2023-05-07 PROCEDURE — A4216 STERILE WATER/SALINE, 10 ML: HCPCS

## 2023-05-07 PROCEDURE — 83735 ASSAY OF MAGNESIUM: CPT

## 2023-05-07 PROCEDURE — 1100000000 HC RM PRIVATE

## 2023-05-07 PROCEDURE — 85025 COMPLETE CBC W/AUTO DIFF WBC: CPT

## 2023-05-07 PROCEDURE — 82962 GLUCOSE BLOOD TEST: CPT

## 2023-05-07 PROCEDURE — 85014 HEMATOCRIT: CPT

## 2023-05-07 PROCEDURE — 80048 BASIC METABOLIC PNL TOTAL CA: CPT

## 2023-05-07 PROCEDURE — 6360000002 HC RX W HCPCS: Performed by: INTERNAL MEDICINE

## 2023-05-07 PROCEDURE — 85018 HEMOGLOBIN: CPT

## 2023-05-07 PROCEDURE — 6360000002 HC RX W HCPCS

## 2023-05-07 PROCEDURE — 2580000003 HC RX 258

## 2023-05-07 PROCEDURE — 6370000000 HC RX 637 (ALT 250 FOR IP)

## 2023-05-07 PROCEDURE — C9113 INJ PANTOPRAZOLE SODIUM, VIA: HCPCS

## 2023-05-07 PROCEDURE — 36415 COLL VENOUS BLD VENIPUNCTURE: CPT

## 2023-05-07 RX ORDER — HEPARIN SODIUM 5000 [USP'U]/ML
5000 INJECTION, SOLUTION INTRAVENOUS; SUBCUTANEOUS EVERY 8 HOURS SCHEDULED
Status: DISCONTINUED | OUTPATIENT
Start: 2023-05-07 | End: 2023-05-09

## 2023-05-07 RX ADMIN — PIPERACILLIN AND TAZOBACTAM 3375 MG: 3; .375 INJECTION, POWDER, LYOPHILIZED, FOR SOLUTION INTRAVENOUS at 23:30

## 2023-05-07 RX ADMIN — HEPARIN SODIUM 5000 UNITS: 5000 INJECTION INTRAVENOUS; SUBCUTANEOUS at 23:30

## 2023-05-07 RX ADMIN — DILTIAZEM HYDROCHLORIDE 120 MG: 120 CAPSULE, COATED, EXTENDED RELEASE ORAL at 09:01

## 2023-05-07 RX ADMIN — INSULIN GLARGINE 15 UNITS: 100 INJECTION, SOLUTION SUBCUTANEOUS at 21:14

## 2023-05-07 RX ADMIN — SODIUM CHLORIDE, PRESERVATIVE FREE 40 MG: 5 INJECTION INTRAVENOUS at 21:18

## 2023-05-07 RX ADMIN — ATORVASTATIN CALCIUM 40 MG: 40 TABLET, FILM COATED ORAL at 21:20

## 2023-05-07 RX ADMIN — SODIUM CHLORIDE, PRESERVATIVE FREE 10 ML: 5 INJECTION INTRAVENOUS at 21:18

## 2023-05-07 RX ADMIN — PIPERACILLIN AND TAZOBACTAM 3375 MG: 3; .375 INJECTION, POWDER, LYOPHILIZED, FOR SOLUTION INTRAVENOUS at 15:55

## 2023-05-07 RX ADMIN — TAMSULOSIN HYDROCHLORIDE 0.4 MG: 0.4 CAPSULE ORAL at 09:01

## 2023-05-07 RX ADMIN — OXYCODONE HYDROCHLORIDE AND ACETAMINOPHEN 1 TABLET: 5; 325 TABLET ORAL at 00:00

## 2023-05-07 RX ADMIN — SODIUM CHLORIDE, PRESERVATIVE FREE 40 MG: 5 INJECTION INTRAVENOUS at 08:59

## 2023-05-07 RX ADMIN — SODIUM CHLORIDE, PRESERVATIVE FREE 10 ML: 5 INJECTION INTRAVENOUS at 08:58

## 2023-05-07 RX ADMIN — OXYCODONE HYDROCHLORIDE AND ACETAMINOPHEN 1 TABLET: 5; 325 TABLET ORAL at 21:40

## 2023-05-07 RX ADMIN — HEPARIN SODIUM 5000 UNITS: 5000 INJECTION INTRAVENOUS; SUBCUTANEOUS at 15:55

## 2023-05-07 RX ADMIN — PIPERACILLIN AND TAZOBACTAM 3375 MG: 3; .375 INJECTION, POWDER, LYOPHILIZED, FOR SOLUTION INTRAVENOUS at 08:59

## 2023-05-07 ASSESSMENT — PAIN DESCRIPTION - LOCATION: LOCATION: PERINEUM

## 2023-05-07 ASSESSMENT — PAIN SCALES - GENERAL
PAINLEVEL_OUTOF10: 0
PAINLEVEL_OUTOF10: 5

## 2023-05-07 ASSESSMENT — PAIN SCALES - WONG BAKER: WONGBAKER_NUMERICALRESPONSE: 0

## 2023-05-08 LAB
GLUCOSE BLD STRIP.AUTO-MCNC: 111 MG/DL (ref 70–110)
GLUCOSE BLD STRIP.AUTO-MCNC: 128 MG/DL (ref 70–110)
GLUCOSE BLD STRIP.AUTO-MCNC: 133 MG/DL (ref 70–110)
GLUCOSE BLD STRIP.AUTO-MCNC: 147 MG/DL (ref 70–110)

## 2023-05-08 PROCEDURE — A4216 STERILE WATER/SALINE, 10 ML: HCPCS

## 2023-05-08 PROCEDURE — 6360000002 HC RX W HCPCS

## 2023-05-08 PROCEDURE — 2580000003 HC RX 258

## 2023-05-08 PROCEDURE — C9113 INJ PANTOPRAZOLE SODIUM, VIA: HCPCS

## 2023-05-08 PROCEDURE — 6370000000 HC RX 637 (ALT 250 FOR IP): Performed by: INTERNAL MEDICINE

## 2023-05-08 PROCEDURE — 2700000000 HC OXYGEN THERAPY PER DAY

## 2023-05-08 PROCEDURE — 82962 GLUCOSE BLOOD TEST: CPT

## 2023-05-08 PROCEDURE — 1100000000 HC RM PRIVATE

## 2023-05-08 PROCEDURE — 6360000002 HC RX W HCPCS: Performed by: INTERNAL MEDICINE

## 2023-05-08 PROCEDURE — 99232 SBSQ HOSP IP/OBS MODERATE 35: CPT | Performed by: HOSPITALIST

## 2023-05-08 PROCEDURE — 6370000000 HC RX 637 (ALT 250 FOR IP)

## 2023-05-08 RX ADMIN — SODIUM CHLORIDE, PRESERVATIVE FREE 40 MG: 5 INJECTION INTRAVENOUS at 21:30

## 2023-05-08 RX ADMIN — TAMSULOSIN HYDROCHLORIDE 0.4 MG: 0.4 CAPSULE ORAL at 09:56

## 2023-05-08 RX ADMIN — PIPERACILLIN AND TAZOBACTAM 3375 MG: 3; .375 INJECTION, POWDER, LYOPHILIZED, FOR SOLUTION INTRAVENOUS at 09:56

## 2023-05-08 RX ADMIN — SODIUM CHLORIDE, PRESERVATIVE FREE 10 ML: 5 INJECTION INTRAVENOUS at 21:31

## 2023-05-08 RX ADMIN — PIPERACILLIN AND TAZOBACTAM 3375 MG: 3; .375 INJECTION, POWDER, LYOPHILIZED, FOR SOLUTION INTRAVENOUS at 23:33

## 2023-05-08 RX ADMIN — HEPARIN SODIUM 5000 UNITS: 5000 INJECTION INTRAVENOUS; SUBCUTANEOUS at 21:30

## 2023-05-08 RX ADMIN — DILTIAZEM HYDROCHLORIDE 120 MG: 120 CAPSULE, COATED, EXTENDED RELEASE ORAL at 09:56

## 2023-05-08 RX ADMIN — PIPERACILLIN AND TAZOBACTAM 3375 MG: 3; .375 INJECTION, POWDER, LYOPHILIZED, FOR SOLUTION INTRAVENOUS at 16:00

## 2023-05-08 RX ADMIN — INSULIN GLARGINE 15 UNITS: 100 INJECTION, SOLUTION SUBCUTANEOUS at 21:30

## 2023-05-08 RX ADMIN — ATORVASTATIN CALCIUM 40 MG: 40 TABLET, FILM COATED ORAL at 21:30

## 2023-05-08 RX ADMIN — SODIUM CHLORIDE, PRESERVATIVE FREE 40 MG: 5 INJECTION INTRAVENOUS at 09:56

## 2023-05-08 RX ADMIN — HEPARIN SODIUM 5000 UNITS: 5000 INJECTION INTRAVENOUS; SUBCUTANEOUS at 15:59

## 2023-05-08 ASSESSMENT — PAIN SCALES - GENERAL: PAINLEVEL_OUTOF10: 0

## 2023-05-08 NOTE — PALLIATIVE CARE
[facility]: [phone])   [facility]: [phone]     Goals of care defined, PM team will sign off. He was very clear he would want the efforts of CPR and Intubation. He also shared he would NOT want to be maintained on machines or have a tracheostomy. AMD completed. No further palliative intervention required. Thank you for the Palliative Medicine consult and allowing us to participate in the care of Mr. Montez Ayala.          GOALS OF CARE: FULL CODE WITH FULL INTERVENTIONS      Ayanna Hernandez BSN, RN, [facility]  Palliative Medicine Inpatient RN  [address]: [phone])

## 2023-05-08 NOTE — CONSULTS
Room 354: orders for wound vac placed on chart, pt has already been seen by wound care dept on 5/5/2023. See chart for assessment. For Hospital: Unit staff nurse to provide wound vac dressing to Left scrotum every Monday, Wednesday and Friday and PRN. Clean with wound spray then apply vac. Settings 125mmhg continuous. **Clean machines are stored in central sterile dept, first floor, take patient label** Upon discharge from [facility], place hospital machine in dirty utility room under white laminated sign. May apply daily saline dressing if home wound vac unavailable. For Home Health: Skilled nurse to perform [facility] dressing change to left scrotum  3 times weekly and prn seal breach or vac malfunction. May soak dressing in saline prior to removal if necessary. Cleanse with NS or wound cleanser and apply Wound Vac at 125 - 175 mmHG  continuous suction. May titrate up or down by 25mmHg  to obtain seal, for increased or decreased amount of drainage. May apply prn non adherent dressing to protect tendons, ligaments, bone, areas not intended for granulation. Use white foam for all tunnels and undermining or areas where wound base is not easily visualized. When using white foam, increase setting to 150mmhg or greater. May apply NS wet to dry dressing prn wound vac problems. Instruct pt/caregiver regarding procedures in case of wound vac problems. Will turn over care to nursing staff at this time.   Juan YOUN, RN, [facility], [address]

## 2023-05-09 LAB
APTT PPP: 36.6 SEC (ref 23–36.4)
ERYTHROCYTE [DISTWIDTH] IN BLOOD BY AUTOMATED COUNT: 17.2 % (ref 11.6–14.5)
ERYTHROCYTE [DISTWIDTH] IN BLOOD BY AUTOMATED COUNT: 17.3 % (ref 11.6–14.5)
GLUCOSE BLD STRIP.AUTO-MCNC: 112 MG/DL (ref 70–110)
GLUCOSE BLD STRIP.AUTO-MCNC: 114 MG/DL (ref 70–110)
GLUCOSE BLD STRIP.AUTO-MCNC: 149 MG/DL (ref 70–110)
GLUCOSE BLD STRIP.AUTO-MCNC: 186 MG/DL (ref 70–110)
GLUCOSE BLD STRIP.AUTO-MCNC: 223 MG/DL (ref 70–110)
HCT VFR BLD AUTO: 24.4 % (ref 36–48)
HCT VFR BLD AUTO: 26.1 % (ref 36–48)
HGB BLD-MCNC: 7.4 G/DL (ref 13–16)
HGB BLD-MCNC: 8.1 G/DL (ref 13–16)
MCH RBC QN AUTO: 29 PG (ref 24–34)
MCH RBC QN AUTO: 29.8 PG (ref 24–34)
MCHC RBC AUTO-ENTMCNC: 30.3 G/DL (ref 31–37)
MCHC RBC AUTO-ENTMCNC: 31 G/DL (ref 31–37)
MCV RBC AUTO: 95.7 FL (ref 78–100)
MCV RBC AUTO: 96 FL (ref 78–100)
NRBC # BLD: 0 K/UL (ref 0–0.01)
NRBC # BLD: 0 K/UL (ref 0–0.01)
NRBC BLD-RTO: 0 PER 100 WBC
NRBC BLD-RTO: 0 PER 100 WBC
PLATELET # BLD AUTO: 204 K/UL (ref 135–420)
PLATELET # BLD AUTO: 224 K/UL (ref 135–420)
PMV BLD AUTO: 10 FL (ref 9.2–11.8)
PMV BLD AUTO: 10.2 FL (ref 9.2–11.8)
RBC # BLD AUTO: 2.55 M/UL (ref 4.35–5.65)
RBC # BLD AUTO: 2.72 M/UL (ref 4.35–5.65)
WBC # BLD AUTO: 4.3 K/UL (ref 4.6–13.2)
WBC # BLD AUTO: 4.8 K/UL (ref 4.6–13.2)

## 2023-05-09 PROCEDURE — 6370000000 HC RX 637 (ALT 250 FOR IP)

## 2023-05-09 PROCEDURE — C9113 INJ PANTOPRAZOLE SODIUM, VIA: HCPCS

## 2023-05-09 PROCEDURE — 6360000002 HC RX W HCPCS: Performed by: INTERNAL MEDICINE

## 2023-05-09 PROCEDURE — 6370000000 HC RX 637 (ALT 250 FOR IP): Performed by: INTERNAL MEDICINE

## 2023-05-09 PROCEDURE — 82962 GLUCOSE BLOOD TEST: CPT

## 2023-05-09 PROCEDURE — 85027 COMPLETE CBC AUTOMATED: CPT

## 2023-05-09 PROCEDURE — 97605 NEG PRS WND THER DME<=50SQCM: CPT

## 2023-05-09 PROCEDURE — 94761 N-INVAS EAR/PLS OXIMETRY MLT: CPT

## 2023-05-09 PROCEDURE — A4216 STERILE WATER/SALINE, 10 ML: HCPCS

## 2023-05-09 PROCEDURE — 36415 COLL VENOUS BLD VENIPUNCTURE: CPT

## 2023-05-09 PROCEDURE — 6360000002 HC RX W HCPCS

## 2023-05-09 PROCEDURE — 99232 SBSQ HOSP IP/OBS MODERATE 35: CPT | Performed by: HOSPITALIST

## 2023-05-09 PROCEDURE — 85730 THROMBOPLASTIN TIME PARTIAL: CPT

## 2023-05-09 PROCEDURE — 97535 SELF CARE MNGMENT TRAINING: CPT

## 2023-05-09 PROCEDURE — 97110 THERAPEUTIC EXERCISES: CPT

## 2023-05-09 PROCEDURE — 1100000000 HC RM PRIVATE

## 2023-05-09 PROCEDURE — 2500000003 HC RX 250 WO HCPCS: Performed by: HOSPITALIST

## 2023-05-09 PROCEDURE — 2580000003 HC RX 258

## 2023-05-09 RX ORDER — HEPARIN SODIUM 1000 [USP'U]/ML
5000 INJECTION, SOLUTION INTRAVENOUS; SUBCUTANEOUS PRN
Status: DISCONTINUED | OUTPATIENT
Start: 2023-05-09 | End: 2023-05-11

## 2023-05-09 RX ORDER — HEPARIN SODIUM 1000 [USP'U]/ML
10000 INJECTION, SOLUTION INTRAVENOUS; SUBCUTANEOUS PRN
Status: DISCONTINUED | OUTPATIENT
Start: 2023-05-09 | End: 2023-05-11

## 2023-05-09 RX ORDER — HEPARIN SODIUM 10000 [USP'U]/100ML
5-30 INJECTION, SOLUTION INTRAVENOUS CONTINUOUS
Status: DISCONTINUED | OUTPATIENT
Start: 2023-05-09 | End: 2023-05-11

## 2023-05-09 RX ADMIN — TAMSULOSIN HYDROCHLORIDE 0.4 MG: 0.4 CAPSULE ORAL at 10:45

## 2023-05-09 RX ADMIN — PIPERACILLIN AND TAZOBACTAM 3375 MG: 3; .375 INJECTION, POWDER, LYOPHILIZED, FOR SOLUTION INTRAVENOUS at 17:06

## 2023-05-09 RX ADMIN — SODIUM CHLORIDE, PRESERVATIVE FREE 40 MG: 5 INJECTION INTRAVENOUS at 10:44

## 2023-05-09 RX ADMIN — ATORVASTATIN CALCIUM 40 MG: 40 TABLET, FILM COATED ORAL at 21:45

## 2023-05-09 RX ADMIN — INSULIN LISPRO 2 UNITS: 100 INJECTION, SOLUTION INTRAVENOUS; SUBCUTANEOUS at 21:44

## 2023-05-09 RX ADMIN — PIPERACILLIN AND TAZOBACTAM 3375 MG: 3; .375 INJECTION, POWDER, LYOPHILIZED, FOR SOLUTION INTRAVENOUS at 10:32

## 2023-05-09 RX ADMIN — INSULIN GLARGINE 15 UNITS: 100 INJECTION, SOLUTION SUBCUTANEOUS at 21:44

## 2023-05-09 RX ADMIN — SODIUM CHLORIDE, PRESERVATIVE FREE 10 ML: 5 INJECTION INTRAVENOUS at 21:46

## 2023-05-09 RX ADMIN — OXYCODONE HYDROCHLORIDE AND ACETAMINOPHEN 1 TABLET: 5; 325 TABLET ORAL at 11:59

## 2023-05-09 RX ADMIN — HEPARIN SODIUM 5000 UNITS: 5000 INJECTION INTRAVENOUS; SUBCUTANEOUS at 05:18

## 2023-05-09 RX ADMIN — SODIUM CHLORIDE, PRESERVATIVE FREE 40 MG: 5 INJECTION INTRAVENOUS at 21:45

## 2023-05-09 RX ADMIN — INSULIN LISPRO 4 UNITS: 100 INJECTION, SOLUTION INTRAVENOUS; SUBCUTANEOUS at 17:12

## 2023-05-09 RX ADMIN — SODIUM CHLORIDE, PRESERVATIVE FREE 10 ML: 5 INJECTION INTRAVENOUS at 10:51

## 2023-05-09 RX ADMIN — DILTIAZEM HYDROCHLORIDE 120 MG: 120 CAPSULE, COATED, EXTENDED RELEASE ORAL at 10:32

## 2023-05-09 RX ADMIN — SODIUM CHLORIDE, PRESERVATIVE FREE 10 ML: 5 INJECTION INTRAVENOUS at 10:34

## 2023-05-09 RX ADMIN — HEPARIN SODIUM 18 UNITS/KG/HR: 10000 INJECTION, SOLUTION INTRAVENOUS at 17:15

## 2023-05-09 RX ADMIN — HEPARIN SODIUM 5000 UNITS: 5000 INJECTION INTRAVENOUS; SUBCUTANEOUS at 14:09

## 2023-05-09 ASSESSMENT — PAIN DESCRIPTION - ORIENTATION
ORIENTATION: MID
ORIENTATION: MID

## 2023-05-09 ASSESSMENT — PAIN SCALES - GENERAL
PAINLEVEL_OUTOF10: 3
PAINLEVEL_OUTOF10: 0
PAINLEVEL_OUTOF10: 3
PAINLEVEL_OUTOF10: 0
PAINLEVEL_OUTOF10: 1

## 2023-05-09 ASSESSMENT — PAIN - FUNCTIONAL ASSESSMENT
PAIN_FUNCTIONAL_ASSESSMENT: ACTIVITIES ARE NOT PREVENTED
PAIN_FUNCTIONAL_ASSESSMENT: PREVENTS OR INTERFERES SOME ACTIVE ACTIVITIES AND ADLS

## 2023-05-09 ASSESSMENT — PAIN DESCRIPTION - DESCRIPTORS
DESCRIPTORS: DISCOMFORT
DESCRIPTORS: ACHING

## 2023-05-09 ASSESSMENT — PAIN DESCRIPTION - LOCATION
LOCATION: SCROTUM
LOCATION: SCROTUM

## 2023-05-09 NOTE — CONSULTS
Room 269: wound vac application to left scrotum, small black granufoam cut to fit & applied 2 pieces into wound. Settings 100mmhg continuous suction via 3M ulta machine serial # [id number], booster pad applied around scrotum edges   To assist with securing seal. .  Will turn over care to nursing staff at this time.   Addie YOUN, RN, [facility], [address]

## 2023-05-10 LAB
APTT PPP: >180 SEC (ref 23–36.4)
GLUCOSE BLD STRIP.AUTO-MCNC: 136 MG/DL (ref 70–110)
GLUCOSE BLD STRIP.AUTO-MCNC: 164 MG/DL (ref 70–110)
GLUCOSE BLD STRIP.AUTO-MCNC: 174 MG/DL (ref 70–110)
GLUCOSE BLD STRIP.AUTO-MCNC: 232 MG/DL (ref 70–110)

## 2023-05-10 PROCEDURE — C9113 INJ PANTOPRAZOLE SODIUM, VIA: HCPCS

## 2023-05-10 PROCEDURE — 6370000000 HC RX 637 (ALT 250 FOR IP)

## 2023-05-10 PROCEDURE — 85730 THROMBOPLASTIN TIME PARTIAL: CPT

## 2023-05-10 PROCEDURE — 94761 N-INVAS EAR/PLS OXIMETRY MLT: CPT

## 2023-05-10 PROCEDURE — 6370000000 HC RX 637 (ALT 250 FOR IP): Performed by: INTERNAL MEDICINE

## 2023-05-10 PROCEDURE — 82962 GLUCOSE BLOOD TEST: CPT

## 2023-05-10 PROCEDURE — 2580000003 HC RX 258: Performed by: INTERNAL MEDICINE

## 2023-05-10 PROCEDURE — 99232 SBSQ HOSP IP/OBS MODERATE 35: CPT | Performed by: HOSPITALIST

## 2023-05-10 PROCEDURE — 6360000002 HC RX W HCPCS

## 2023-05-10 PROCEDURE — 2500000003 HC RX 250 WO HCPCS: Performed by: HOSPITALIST

## 2023-05-10 PROCEDURE — 2580000003 HC RX 258

## 2023-05-10 PROCEDURE — 1100000000 HC RM PRIVATE

## 2023-05-10 PROCEDURE — A4216 STERILE WATER/SALINE, 10 ML: HCPCS

## 2023-05-10 PROCEDURE — 6360000002 HC RX W HCPCS: Performed by: INTERNAL MEDICINE

## 2023-05-10 RX ORDER — METRONIDAZOLE 500 MG/1
500 TABLET ORAL EVERY 12 HOURS
Status: DISCONTINUED | OUTPATIENT
Start: 2023-05-11 | End: 2023-05-13 | Stop reason: HOSPADM

## 2023-05-10 RX ORDER — CIPROFLOXACIN 500 MG/1
500 TABLET, FILM COATED ORAL EVERY 12 HOURS SCHEDULED
Status: DISCONTINUED | OUTPATIENT
Start: 2023-05-11 | End: 2023-05-13 | Stop reason: HOSPADM

## 2023-05-10 RX ADMIN — SODIUM CHLORIDE, PRESERVATIVE FREE 40 MG: 5 INJECTION INTRAVENOUS at 22:57

## 2023-05-10 RX ADMIN — SODIUM CHLORIDE, PRESERVATIVE FREE 10 ML: 5 INJECTION INTRAVENOUS at 22:58

## 2023-05-10 RX ADMIN — ATORVASTATIN CALCIUM 40 MG: 40 TABLET, FILM COATED ORAL at 22:57

## 2023-05-10 RX ADMIN — SODIUM CHLORIDE, PRESERVATIVE FREE 10 ML: 5 INJECTION INTRAVENOUS at 08:40

## 2023-05-10 RX ADMIN — INSULIN LISPRO 2 UNITS: 100 INJECTION, SOLUTION INTRAVENOUS; SUBCUTANEOUS at 12:54

## 2023-05-10 RX ADMIN — INSULIN GLARGINE 15 UNITS: 100 INJECTION, SOLUTION SUBCUTANEOUS at 22:57

## 2023-05-10 RX ADMIN — INSULIN LISPRO 4 UNITS: 100 INJECTION, SOLUTION INTRAVENOUS; SUBCUTANEOUS at 22:57

## 2023-05-10 RX ADMIN — INSULIN LISPRO 2 UNITS: 100 INJECTION, SOLUTION INTRAVENOUS; SUBCUTANEOUS at 16:42

## 2023-05-10 RX ADMIN — HEPARIN SODIUM 15 UNITS/KG/HR: 10000 INJECTION, SOLUTION INTRAVENOUS at 03:46

## 2023-05-10 RX ADMIN — HEPARIN SODIUM 9 UNITS/KG/HR: 10000 INJECTION, SOLUTION INTRAVENOUS at 18:09

## 2023-05-10 RX ADMIN — SODIUM CHLORIDE, PRESERVATIVE FREE 40 MG: 5 INJECTION INTRAVENOUS at 08:39

## 2023-05-10 RX ADMIN — DILTIAZEM HYDROCHLORIDE 120 MG: 120 CAPSULE, COATED, EXTENDED RELEASE ORAL at 08:39

## 2023-05-10 RX ADMIN — TAMSULOSIN HYDROCHLORIDE 0.4 MG: 0.4 CAPSULE ORAL at 08:39

## 2023-05-10 RX ADMIN — PIPERACILLIN AND TAZOBACTAM 3375 MG: 3; .375 INJECTION, POWDER, LYOPHILIZED, FOR SOLUTION INTRAVENOUS at 16:42

## 2023-05-10 RX ADMIN — PIPERACILLIN AND TAZOBACTAM 3375 MG: 3; .375 INJECTION, POWDER, LYOPHILIZED, FOR SOLUTION INTRAVENOUS at 00:03

## 2023-05-10 RX ADMIN — PIPERACILLIN AND TAZOBACTAM 3375 MG: 3; .375 INJECTION, POWDER, LYOPHILIZED, FOR SOLUTION INTRAVENOUS at 08:39

## 2023-05-11 LAB
APTT PPP: 141.2 SEC (ref 23–36.4)
APTT PPP: 72.1 SEC (ref 23–36.4)
ERYTHROCYTE [DISTWIDTH] IN BLOOD BY AUTOMATED COUNT: 17 % (ref 11.6–14.5)
GLUCOSE BLD STRIP.AUTO-MCNC: 155 MG/DL (ref 70–110)
GLUCOSE BLD STRIP.AUTO-MCNC: 159 MG/DL (ref 70–110)
GLUCOSE BLD STRIP.AUTO-MCNC: 192 MG/DL (ref 70–110)
GLUCOSE BLD STRIP.AUTO-MCNC: 202 MG/DL (ref 70–110)
HCT VFR BLD AUTO: 24.1 % (ref 36–48)
HGB BLD-MCNC: 7.4 G/DL (ref 13–16)
MCH RBC QN AUTO: 29.8 PG (ref 24–34)
MCHC RBC AUTO-ENTMCNC: 30.7 G/DL (ref 31–37)
MCV RBC AUTO: 97.2 FL (ref 78–100)
NRBC # BLD: 0 K/UL (ref 0–0.01)
NRBC BLD-RTO: 0 PER 100 WBC
PLATELET # BLD AUTO: 220 K/UL (ref 135–420)
PMV BLD AUTO: 10.1 FL (ref 9.2–11.8)
RBC # BLD AUTO: 2.48 M/UL (ref 4.35–5.65)
WBC # BLD AUTO: 5.6 K/UL (ref 4.6–13.2)

## 2023-05-11 PROCEDURE — 1100000000 HC RM PRIVATE

## 2023-05-11 PROCEDURE — 2580000003 HC RX 258

## 2023-05-11 PROCEDURE — 36415 COLL VENOUS BLD VENIPUNCTURE: CPT

## 2023-05-11 PROCEDURE — 97535 SELF CARE MNGMENT TRAINING: CPT

## 2023-05-11 PROCEDURE — 85027 COMPLETE CBC AUTOMATED: CPT

## 2023-05-11 PROCEDURE — 2500000003 HC RX 250 WO HCPCS: Performed by: HOSPITALIST

## 2023-05-11 PROCEDURE — 99232 SBSQ HOSP IP/OBS MODERATE 35: CPT | Performed by: HOSPITALIST

## 2023-05-11 PROCEDURE — 6370000000 HC RX 637 (ALT 250 FOR IP)

## 2023-05-11 PROCEDURE — 85730 THROMBOPLASTIN TIME PARTIAL: CPT

## 2023-05-11 PROCEDURE — 6370000000 HC RX 637 (ALT 250 FOR IP): Performed by: INTERNAL MEDICINE

## 2023-05-11 PROCEDURE — 6360000002 HC RX W HCPCS: Performed by: HOSPITALIST

## 2023-05-11 PROCEDURE — C9113 INJ PANTOPRAZOLE SODIUM, VIA: HCPCS

## 2023-05-11 PROCEDURE — 97530 THERAPEUTIC ACTIVITIES: CPT

## 2023-05-11 PROCEDURE — A4216 STERILE WATER/SALINE, 10 ML: HCPCS

## 2023-05-11 PROCEDURE — 6360000002 HC RX W HCPCS

## 2023-05-11 PROCEDURE — 82962 GLUCOSE BLOOD TEST: CPT

## 2023-05-11 PROCEDURE — 6370000000 HC RX 637 (ALT 250 FOR IP): Performed by: HOSPITALIST

## 2023-05-11 RX ADMIN — APIXABAN 5 MG: 5 TABLET, FILM COATED ORAL at 20:05

## 2023-05-11 RX ADMIN — SODIUM CHLORIDE, PRESERVATIVE FREE 40 MG: 5 INJECTION INTRAVENOUS at 09:15

## 2023-05-11 RX ADMIN — SODIUM CHLORIDE, PRESERVATIVE FREE 10 ML: 5 INJECTION INTRAVENOUS at 09:15

## 2023-05-11 RX ADMIN — METRONIDAZOLE 500 MG: 500 TABLET ORAL at 05:53

## 2023-05-11 RX ADMIN — APIXABAN 5 MG: 5 TABLET, FILM COATED ORAL at 13:10

## 2023-05-11 RX ADMIN — TAMSULOSIN HYDROCHLORIDE 0.4 MG: 0.4 CAPSULE ORAL at 08:08

## 2023-05-11 RX ADMIN — SODIUM CHLORIDE, PRESERVATIVE FREE 10 ML: 5 INJECTION INTRAVENOUS at 20:07

## 2023-05-11 RX ADMIN — INSULIN LISPRO 2 UNITS: 100 INJECTION, SOLUTION INTRAVENOUS; SUBCUTANEOUS at 11:42

## 2023-05-11 RX ADMIN — CIPROFLOXACIN 500 MG: 500 TABLET, FILM COATED ORAL at 20:05

## 2023-05-11 RX ADMIN — DILTIAZEM HYDROCHLORIDE 120 MG: 120 CAPSULE, COATED, EXTENDED RELEASE ORAL at 08:08

## 2023-05-11 RX ADMIN — HEPARIN SODIUM 8 UNITS/KG/HR: 10000 INJECTION, SOLUTION INTRAVENOUS at 11:43

## 2023-05-11 RX ADMIN — INSULIN LISPRO 2 UNITS: 100 INJECTION, SOLUTION INTRAVENOUS; SUBCUTANEOUS at 17:05

## 2023-05-11 RX ADMIN — SODIUM CHLORIDE, PRESERVATIVE FREE 40 MG: 5 INJECTION INTRAVENOUS at 20:04

## 2023-05-11 RX ADMIN — CIPROFLOXACIN 500 MG: 500 TABLET, FILM COATED ORAL at 08:08

## 2023-05-11 RX ADMIN — INSULIN LISPRO 2 UNITS: 100 INJECTION, SOLUTION INTRAVENOUS; SUBCUTANEOUS at 08:07

## 2023-05-11 RX ADMIN — METRONIDAZOLE 500 MG: 500 TABLET ORAL at 18:32

## 2023-05-11 RX ADMIN — INSULIN GLARGINE 15 UNITS: 100 INJECTION, SOLUTION SUBCUTANEOUS at 20:07

## 2023-05-11 RX ADMIN — HEPARIN SODIUM 5000 UNITS: 1000 INJECTION INTRAVENOUS; SUBCUTANEOUS at 01:52

## 2023-05-11 RX ADMIN — INSULIN LISPRO 4 UNITS: 100 INJECTION, SOLUTION INTRAVENOUS; SUBCUTANEOUS at 20:06

## 2023-05-11 RX ADMIN — ATORVASTATIN CALCIUM 40 MG: 40 TABLET, FILM COATED ORAL at 20:05

## 2023-05-11 ASSESSMENT — PAIN SCALES - GENERAL
PAINLEVEL_OUTOF10: 0

## 2023-05-11 ASSESSMENT — PAIN SCALES - WONG BAKER
WONGBAKER_NUMERICALRESPONSE: 0
WONGBAKER_NUMERICALRESPONSE: 0

## 2023-05-12 VITALS
SYSTOLIC BLOOD PRESSURE: 147 MMHG | TEMPERATURE: 98.8 F | BODY MASS INDEX: 45.1 KG/M2 | OXYGEN SATURATION: 97 % | RESPIRATION RATE: 18 BRPM | HEART RATE: 59 BPM | WEIGHT: 315 LBS | HEIGHT: 70 IN | DIASTOLIC BLOOD PRESSURE: 86 MMHG

## 2023-05-12 LAB
APTT PPP: 35.3 SEC (ref 23–36.4)
ERYTHROCYTE [DISTWIDTH] IN BLOOD BY AUTOMATED COUNT: 17.2 % (ref 11.6–14.5)
GLUCOSE BLD STRIP.AUTO-MCNC: 148 MG/DL (ref 70–110)
GLUCOSE BLD STRIP.AUTO-MCNC: 153 MG/DL (ref 70–110)
GLUCOSE BLD STRIP.AUTO-MCNC: 160 MG/DL (ref 70–110)
GLUCOSE BLD STRIP.AUTO-MCNC: 168 MG/DL (ref 70–110)
GLUCOSE BLD STRIP.AUTO-MCNC: 174 MG/DL (ref 70–110)
HCT VFR BLD AUTO: 24.8 % (ref 36–48)
HGB BLD-MCNC: 7.5 G/DL (ref 13–16)
MCH RBC QN AUTO: 29.5 PG (ref 24–34)
MCHC RBC AUTO-ENTMCNC: 30.2 G/DL (ref 31–37)
MCV RBC AUTO: 97.6 FL (ref 78–100)
NRBC # BLD: 0 K/UL (ref 0–0.01)
NRBC BLD-RTO: 0 PER 100 WBC
PLATELET # BLD AUTO: 220 K/UL (ref 135–420)
PMV BLD AUTO: 9.9 FL (ref 9.2–11.8)
RBC # BLD AUTO: 2.54 M/UL (ref 4.35–5.65)
WBC # BLD AUTO: 4.8 K/UL (ref 4.6–13.2)

## 2023-05-12 PROCEDURE — 85730 THROMBOPLASTIN TIME PARTIAL: CPT

## 2023-05-12 PROCEDURE — 85027 COMPLETE CBC AUTOMATED: CPT

## 2023-05-12 PROCEDURE — 6370000000 HC RX 637 (ALT 250 FOR IP): Performed by: HOSPITALIST

## 2023-05-12 PROCEDURE — 82962 GLUCOSE BLOOD TEST: CPT

## 2023-05-12 PROCEDURE — 36415 COLL VENOUS BLD VENIPUNCTURE: CPT

## 2023-05-12 PROCEDURE — 99232 SBSQ HOSP IP/OBS MODERATE 35: CPT | Performed by: HOSPITALIST

## 2023-05-12 PROCEDURE — 6370000000 HC RX 637 (ALT 250 FOR IP): Performed by: INTERNAL MEDICINE

## 2023-05-12 PROCEDURE — C9113 INJ PANTOPRAZOLE SODIUM, VIA: HCPCS

## 2023-05-12 PROCEDURE — 97535 SELF CARE MNGMENT TRAINING: CPT

## 2023-05-12 PROCEDURE — A4216 STERILE WATER/SALINE, 10 ML: HCPCS

## 2023-05-12 PROCEDURE — 2580000003 HC RX 258

## 2023-05-12 PROCEDURE — 6360000002 HC RX W HCPCS

## 2023-05-12 PROCEDURE — 6370000000 HC RX 637 (ALT 250 FOR IP)

## 2023-05-12 PROCEDURE — 1100000000 HC RM PRIVATE

## 2023-05-12 PROCEDURE — 94761 N-INVAS EAR/PLS OXIMETRY MLT: CPT

## 2023-05-12 RX ORDER — CIPROFLOXACIN 500 MG/1
500 TABLET, FILM COATED ORAL EVERY 12 HOURS SCHEDULED
Qty: 12 TABLET | Refills: 0 | Status: SHIPPED | OUTPATIENT
Start: 2023-05-12 | End: 2023-05-18

## 2023-05-12 RX ORDER — DILTIAZEM HYDROCHLORIDE 120 MG/1
120 CAPSULE, COATED, EXTENDED RELEASE ORAL DAILY
Qty: 30 CAPSULE | Refills: 3 | Status: SHIPPED | OUTPATIENT
Start: 2023-05-13

## 2023-05-12 RX ORDER — METRONIDAZOLE 500 MG/1
500 TABLET ORAL EVERY 12 HOURS
Qty: 12 TABLET | Refills: 0 | Status: SHIPPED | OUTPATIENT
Start: 2023-05-12 | End: 2023-05-18

## 2023-05-12 RX ADMIN — CIPROFLOXACIN 500 MG: 500 TABLET, FILM COATED ORAL at 20:42

## 2023-05-12 RX ADMIN — SODIUM CHLORIDE, PRESERVATIVE FREE 10 ML: 5 INJECTION INTRAVENOUS at 08:16

## 2023-05-12 RX ADMIN — INSULIN LISPRO 2 UNITS: 100 INJECTION, SOLUTION INTRAVENOUS; SUBCUTANEOUS at 20:43

## 2023-05-12 RX ADMIN — SODIUM CHLORIDE, PRESERVATIVE FREE 40 MG: 5 INJECTION INTRAVENOUS at 10:09

## 2023-05-12 RX ADMIN — METRONIDAZOLE 500 MG: 500 TABLET ORAL at 17:07

## 2023-05-12 RX ADMIN — APIXABAN 5 MG: 5 TABLET, FILM COATED ORAL at 08:16

## 2023-05-12 RX ADMIN — CIPROFLOXACIN 500 MG: 500 TABLET, FILM COATED ORAL at 08:16

## 2023-05-12 RX ADMIN — SODIUM CHLORIDE, PRESERVATIVE FREE 10 ML: 5 INJECTION INTRAVENOUS at 20:50

## 2023-05-12 RX ADMIN — INSULIN LISPRO 2 UNITS: 100 INJECTION, SOLUTION INTRAVENOUS; SUBCUTANEOUS at 12:03

## 2023-05-12 RX ADMIN — APIXABAN 5 MG: 5 TABLET, FILM COATED ORAL at 20:42

## 2023-05-12 RX ADMIN — INSULIN LISPRO 2 UNITS: 100 INJECTION, SOLUTION INTRAVENOUS; SUBCUTANEOUS at 17:07

## 2023-05-12 RX ADMIN — ATORVASTATIN CALCIUM 40 MG: 40 TABLET, FILM COATED ORAL at 20:42

## 2023-05-12 RX ADMIN — SODIUM CHLORIDE, PRESERVATIVE FREE 40 MG: 5 INJECTION INTRAVENOUS at 20:43

## 2023-05-12 RX ADMIN — DILTIAZEM HYDROCHLORIDE 120 MG: 120 CAPSULE, COATED, EXTENDED RELEASE ORAL at 08:16

## 2023-05-12 RX ADMIN — METRONIDAZOLE 500 MG: 500 TABLET ORAL at 06:37

## 2023-05-12 RX ADMIN — TAMSULOSIN HYDROCHLORIDE 0.4 MG: 0.4 CAPSULE ORAL at 08:16

## 2023-05-12 RX ADMIN — INSULIN GLARGINE 15 UNITS: 100 INJECTION, SOLUTION SUBCUTANEOUS at 20:42

## 2023-05-12 ASSESSMENT — PAIN SCALES - GENERAL
PAINLEVEL_OUTOF10: 0

## 2023-05-12 NOTE — DISCHARGE SUMMARY
further management. At [facility] upon further testing it is found that patient has diverticulitis of the left distal colon, moderate burden PE. Of note, patient had bilateral PE in August 2020 that he has been taking Eliquis for since then. 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 Currently upon my evaluation, patient resting in bed in no apparent distress. Oxygen has been taken off and does not complain of shortness of breath at this time. Denies nausea vomiting diarrhea. Rates abdominal pain at a 3 out of 10. Denies chest pain, fever, chills, headache, dizziness, cough. Denies smoking, drinking, illicit drug use. Upon arrival to [facility], temperature 98.0, respiration 20, heart rate 69, blood pressure 132/87, 100% on 2 L nasal cannula. Evidently patient had hypotension upon arrival at [facility] which has come up with IV fluid bolus. Creatinine 1.71, glucose 206, proBNP 630, troponin 95. TSH 1.39. Hemoglobin 7.1, hematocrit 23.0. Chest x-ray negative. CT abdomen and pelvis with no active GI bleed during the scan, diverticulitis of distal left colon, moderate burden pulmonary embolism noted to the lung bases, no stigmata of right heart failure. Patient has only received Flagyl at [facility]. Medications reconciled using paperwork sent from the facility  CODE STATUS discussed-full code with palliative care consult    Hospital Course:     74-year-old male with multiple medical problems presented to the emergency room secondary to lower GI bleeding. Patient is on Eliquis secondary to bilateral PE. Patient mentions he was passing clots. CT abdomen and pelvis showed diverticulitis. GI consulted, patient placed on IV antibiotics. 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   Patient was noted to be anemic and

## 2023-05-12 NOTE — PLAN OF CARE
Problem: Discharge Planning  Goal: Discharge to home or other facility with appropriate resources  Outcome: Completed     Problem: Pain  Goal: Verbalizes/displays adequate comfort level or baseline comfort level  Outcome: Completed     Problem: Safety - Adult  Goal: Free from fall injury  Outcome: Completed     Problem: Chronic Conditions and Co-morbidities  Goal: Patient's chronic conditions and co-morbidity symptoms are monitored and maintained or improved  Outcome: Completed     Problem: ABCDS Injury Assessment  Goal: Absence of physical injury  Outcome: Completed     Problem: Skin/Tissue Integrity  Goal: Absence of new skin breakdown  Description: 1. Monitor for areas of redness and/or skin breakdown  2. Assess vascular access sites hourly  3. Every 4-6 hours minimum:  Change oxygen saturation probe site  4. Every 4-6 hours:  If on nasal continuous positive airway pressure, respiratory therapy assess nares and determine need for appliance change or resting period.   Outcome: Completed
Problem: Occupational Therapy - Adult  Goal: By Discharge: Performs self-care activities at highest level of function for planned discharge setting. See evaluation for individualized goals. Description: Occupational Therapy Goals:  Initiated 5/6/2023 to be met within 7-10 days. 1.  Patient will perform bed mobility for ADLs with supervision/set-up. 2.  Patient will perform upper body dressing with supervision/set-up. 3.  Patient will perform lower body dressing with moderate assistance . 4. Patient will perform toilet transfers with minimal assistance/contact guard assist.  5.  Patient will perform all aspects of toileting with moderate assistance . 6. Patient will participate in upper extremity therapeutic exercise/activities with supervision/set-up for 8 minutes to improve endurance and UB strength needed for ADLs    7. Patient will utilize energy conservation techniques during functional activities with verbal cues. PLOF: Pt admitted from SNF where he is staying for rehab. Prior to his previous hospitalization in April pt was Mod Ind for ADLs and functional mobility. Pt lives with family. Outcome: Progressing       5/11/2023 1136 by Lynne Andujar PT  Outcome: Progressing   OCCUPATIONAL THERAPY TREATMENT    Patient: Ria Koo (84 y.o. 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   ASSESSMENT:  Pt sitting up in recliner chair, plan to address LB dressing, although unable to locate bariatric socks, pt will benefit from instruction and issue of adaptive equipment for sock aide and reacher next tx session in order to increase
Problem: Occupational Therapy - Adult  Goal: By Discharge: Performs self-care activities at highest level of function for planned discharge setting. See evaluation for individualized goals. Description: Occupational Therapy Goals:  Initiated 5/6/2023 to be met within 7-10 days. 1.  Patient will perform bed mobility for ADLs with supervision/set-up. 2.  Patient will perform upper body dressing with supervision/set-up. 3.  Patient will perform lower body dressing with moderate assistance . 4. Patient will perform toilet transfers with minimal assistance/contact guard assist.  5.  Patient will perform all aspects of toileting with moderate assistance . 6. Patient will participate in upper extremity therapeutic exercise/activities with supervision/set-up for 8 minutes to improve endurance and UB strength needed for ADLs    7. Patient will utilize energy conservation techniques during functional activities with verbal cues. PLOF: Pt admitted from SNF where he is staying for rehab. Prior to his previous hospitalization in April pt was Mod Ind for ADLs and functional mobility. Pt lives with family. Outcome: Progressing   OCCUPATIONAL THERAPY TREATMENT    Patient: Ruth Zelaya (44 y.o. 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 Prior to his previous hospitalization in April pt was Mod Ind for ADLs and functional mobility. Pt lives with family. Chart, occupational therapy assessment, plan of care, and goals were reviewed. ASSESSMENT:  Pt presented supine in bed upon entry and agreeable for bed level activity 2/2 fatigue.  Pt was educated on and issued AE for
Problem: Physical Therapy - Adult  Goal: By Discharge: Performs mobility at highest level of function for planned discharge setting. See evaluation for individualized goals. Description: Physical Therapy Goals:  Initiated 5/6/2023 to be met within 7-10 days. 1.  Patient will move from supine to sit and sit to supine  in bed with modified independence. 2.  Patient will transfer from bed to chair and chair to bed with modified independence using the least restrictive device. 3.  Patient will perform sit to stand with modified independence. 4.  Patient will ambulate with modified independence for 100 feet with the least restrictive device. 5.  Patient will ascend/descend 3 stairs with (U) handrail(s) with modified independence. PLOF: Recently in rehab, mod (I) before with RW vs SPC. Lives with family. Outcome: Progressing   PHYSICAL THERAPY TREATMENT    Patient: Mahi Maloney (78 y.o. 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 Min A for supine to sit. Seated EOB with good balance. Min A x2 for sit to stand. Cues for standing posture and safe hand placement with transfer. Good eccentric control with stand to sit. Completed sit to stand x3 trials. Completed reaching tasks on last standing trial. Returned to seated in bed. Educated on position changes in bed. Patient on bariatric air mattress. Educated on need for RN assistance with mobility; verbalized understanding. Call bell in reach.      Progression toward goals:   []      Improving appropriately and progressing toward goals  [x]      Improving slowly and progressing toward goals  []
requiring increased support through RW. Pt able to stand for 1 min during max A toileting hygiene. Pt returned to bedside for rest break and performed lateral scoots x 3 more attempts in prep for sit>stand and functional transfer to [facility]. Pt stood x 2 more attempts with CGA and RW with rocking motion to increase momentum. Pt able to release B hands one at a time to challenge dynamic standing balance in prep for grooming in stance and hygiene in stance. Pt returned to supine with min A and scooted in bed with SBA. Pt set up with all needs at end of session. Progression toward goals:  []          Improving appropriately and progressing toward goals  [x]          Improving slowly and progressing toward goals  []          Not making progress toward goals and plan of care will be adjusted     PLAN:  Patient continues to benefit from skilled intervention to address the above impairments. Continue treatment per established plan of care. Further Equipment Recommendations for Discharge: bariatric bedside commode and rolling walker    AMPA: AM-PAC Inpatient Daily Activity Raw Score: 14    Current research shows that an AM-PAC score of 17 or less is not associated with a discharge to the patient's home setting. Based on an AM-PAC score and their current ADL deficits; it is recommended that the patient have 5-7 sessions per week of Occupational Therapy at d/c to increase the patient's independence. Currently, this patient demonstrates the potential endurance, and/or tolerance for 3 hours of therapy each day at d/c. This AMPA score should be considered in conjunction with interdisciplinary team recommendations to determine the most appropriate discharge setting. Patient's social support, diagnosis, medical stability, and prior level of function should also be taken into consideration. SUBJECTIVE:   Patient stated, \"I am going to try to do the most I can. \"    OBJECTIVE DATA SUMMARY:   Cognitive/Behavioral
Patient Education  Education Given To: Patient  Education Provided: Role of Therapy;Plan of Care  Education Method: Demonstration;Verbal;Teach Back  Barriers to Learning: None  Education Outcome: Verbalized understanding;Demonstrated understanding;Continued education needed      Maria De Jesus Case PT  Minutes: 24

## 2023-05-12 NOTE — CARE COORDINATION
05/12/23 1434   IMM Letter   IMM Letter given to Patient/Family/Significant other/Guardian/POA/by: to patient   IMM Letter date given: 05/12/23   IMM Letter time given: [address] [facility]  Case [facility]
Alex Gonzalez RN  Case Management Department  Ph: [phone]

## 2023-05-13 NOTE — PROGRESS NOTES
0730: Bedside and Verbal shift change report given to JonnathanRN by , Suresh Dang RN (offgoing nurse). Report included the following information Nurse Handoff Report, Intake/Output, MAR, and Recent Results. 1045: WOC RN came & assessed pt. Wd to scrotum & groin/perineal area. WD vac applied @100 mmhg continuous suction via 3M ulta machine serial # [id number] change for MWF. RN provided gaetano care, changed inct. Pads & cleaned inct. Moderate amt. Brown bowel. 1200: RN administered Percocet I tablet po for pain at 8 score per pt.    1400: RN reassessment of pt pain level at 1 per pt.    1430: Pt. Up w/PT for assessment OOB, per pt. Tolerated well.
0730: Bedside shift change report given to Stefan (oncoming nurse) by Kaylan Turner RN (offgoing nurse). Report included the following information Nurse Handoff Report, Adult Overview, Intake/Output, MAR, and Cardiac Rhythm Paced . 1910: Bedside shift change report given to Maurice Reyes RN (oncoming nurse) by Lucas Johnson RN (offgoing nurse). Report included the following information Nurse Handoff Report, Adult Overview, Intake/Output, MAR, and Cardiac Rhythm Paced .
[phone]    Gastroenterology follow up-Progress note    Impression:   1. Rectal bleeding - likely diverticular (resolved)   - CTA 5/4 negative for GI bleed  - Last colonoscopy 11/14/13 with Dr. Willam Felxi, a hx of polyps  2. Guaiac positive stool  3. Anemia - chronic  -H/H on admission 7.1/23.0, currently 7.5/24.7  -ferritin 132, iron 43, iron sat 23%  4. Diverticulitis, uncomplicated  -seen on CTA 5/4   - on Zosyn  5. Bilateral PE since 8/2020 on Eliquis  -seen on CTA 5/4/23  -s/p IVC filter on 5/5/2023. 6. Afib on Eliquis  7. CKD  8. Hx of Melody's gangrene  -Admitted to [facility] and underwent surgical debridement 4/2023  9. DM type 2  10. ASHLEY on CPAP  11. Severely obese at 350 lbs/50 kg      Plan:     1. Monitor H/H and transfuse as per protocol  2. Anticoagulant management per primary team.  3. Continue antibiotics x 2 weeks total-recommend repeat scan + colonoscopy in 6-12 weeks as outpatient to confirm resolution of diverticulitis and to rule out luminal lesion. 4. Recommend CTA vs NM bleeding scan with IR intervention if indicated for overt GI bleeding with significant H/H drop  5. Medical management as per primary team.  6. Can advance diet. High fiber diet/supplement as appropriate when consuming regular diet. 7. Outpatient GI follow-up after discharged. Available as needed. Please do not hesitate to call with any questions or concerns, or should event occur that may necessitate additional GI evaluation. Subjective:  no GI events. No recurrent GI bleed nor significant abdominal pain. Tolerating meals without difficulty. ROS: Denies any fevers, chills, rash.      Eyes: conjunctiva normal   Neck: supple and trachea normal   Cardiovascular: heart normal   Pulmonary/Chest Wall: breath sounds normal and effort normal   Abdominal: benign     Patient Active Problem List   Diagnosis    Bilateral pulmonary embolism (HCC)    Hypertension    Hyperlipidemia    Diabetes mellitus,
ARU/IPR REFERRAL CONTACT NOTE  [address] [facility]      Thank you for the opportunity to review this patient's case for admission to [address] [facility]. Based on our pre-admission screening:     [ x] Our Team/Medical Director is following this case. Comments: Referral received will review with team.       Again, Thank you for this referral. Should you have any questions please do not hesitate to call.      Sincerely,  Pamula Koyanagi [facility]  [phone]
ARU/IPR REFERRAL CONTACT NOTE  [address] [facility]      Thank you for the opportunity to review this patient's case for admission to [id number] [place] [address] for Physical Rehabilitation. Based on our pre-admission screening:     [x ] This patient does not meet criteria for admission to [facility] due to:    [x ] Other:Needs additional time for recovery and management of wound care needs. Again, Thank you for this referral. Should you have any questions please do not hesitate to call.      Sincerely,  Uli Stratton  [facility]  [phone]
Called [place] transport at [phone] system placed me on hold for a good while and then asked to leave a message. Called [email or web address] at [phone] scheduled 8:00 pm transport to [facility] ([address] [place] [address]) with Tonia Pinedo. .  Informed Robyne Pinch transport has been arranged
Comprehensive Nutrition Assessment    Type and Reason for Visit:  Initial, RD Nutrition Re-Screen/LOS    Nutrition Recommendations/Plan:   Continue current diet as tolerated. Order Patrick (each provides 95 kcal, 2.5g collagen) BID. Continue to monitor tolerance of PO, compliance of oral supplements, weight, labs, and plan of care during admission. Malnutrition Assessment:  Malnutrition Status:  No malnutrition (05/11/23 0919)      Nutrition History and Allergies: PMHx: HTN, HLD, Afib, PE, DMT2, CKD, SSS, ASHLEY, Hep C, recent Melody's gangrene; underwent debridement 4/2023. Wt hx: 344 lb (12/15/23), 328 lb (4/20/23), noted current charted wt is stated. Wt stable using pt stated wt, states he may have lost some weight in the hospital, bed scale not working at time of visit. If wt from 4/20/23 accurate - wt loss of 4.6% x ~4 months (not significant). Pt reports good intake/appetite PTA, no decrease in appetite. NKFA. Nutrition Assessment:    Admitted for SOB; rectal bleeding - diverticulitis seen on CTA 5/4. Pt reports eating well in-house, ate majority of breakfast this AM except oatmeal. Tolerating PO diet, denies n/v. Pt w/ wound vac in place per WOCN. Discussed Patrick with pt, agreeable to try, provided handout and coupon. Nutrition Related Findings:    Last BM (including prior to admit): 05/10/23  Edema: Right lower extremity, Left lower extremity  Edema Generalized: +1 Pertinent Meds: lipitor, cipro, lantus, humalog, protonix Pertinent Labs: POC Glucose 136-232 mg/dl x 24 hrs, 5/4/23: Hgb A1C 6 H Wound Type: Wound Vac, Venous Stasis, Moisture Associate Skin Damage       Current Nutrition Intake & Therapies:    Average Meal Intake: %  Average Supplements Intake: None Ordered  ADULT DIET;  Regular; Low Fat/Low Chol/High Fiber/2 gm Na    Anthropometric Measures:  Height: 5' 10\" (177.8 cm)  Ideal Body Weight (IBW): 166 lbs (75 kg)    Admission Body Weight: 350 lb 1.5 oz (158.8 kg) (158.8
[facility] Hospitalist Group  Progress Note    Patient: Lauren Cabral Age: 72 y.o. : 1950 MR#: [id number] SSN: [id number]  Date: 2023       Subjective/24-hour events:     Patient seen and evaluated, lying in bed, no acute distress. Patient with a known history of foreign years gangrene, wound care on board, placed on wound VAC. Continue IV antibiotics. CTA with moderate burden pulmonary embolism, Eliquis held, per hematology if no further bleeding may initiate IV heparin. Will monitor overnight and if no further bleeding and hemoglobin remained stable, start IV heparin GGT in a.m. without bolus. Assessment:   Diverticulitis  GI bleed, w/acute blood loss anemia, diverticular  Bilateral pulmonary emboli   PAF  HTN  HLD  DM 2  CKD  ASHLEY on CPAP  Recent Melody's gangrene  Morbid obesity    Plan:   Repeat Hgb 6.8 -->7.1. Will hold off on additional transfusion for now. Eliquis remains on hold but will likely re-challenge with full Lynita Killeen therapy prior to discharge given issues with PEs. Discussed with GI attending today, continued assistance appreciated. Maintain telemetry monitoring. Antibiotic therapy and wound care as ordered. Zosyn through  to complete treatment for Melody's gangrene. Keep eye on UOP, monitor for retention s/p Martin removal.  Lantus/SSI - blood sugars in good control overall. Adjust as necessary to maintain. PT/OT as tolerated. SNF disposition anticipated at discharge.     Case discussed with:  [x]Patient  []Family  [x] Nursing  []Case Management  DVT Prophylaxis:  []Lovenox  [x]Hep SQ  []SCDs  []Coumadin   []On Heparin gtt []PO anticoagulant    Objective:   VS: BP (!) 148/84   Pulse 63   Temp 98.5 °F (36.9 °C) (Oral)   Resp 17   Ht 5' 10\" (1.778 m)   Wt (!) 350 lb (158.8 kg)   SpO2 96%   BMI 50.22 kg/m²      Tmax/24hrs: Temp (24hrs), Av.3 °F (36.8 °C), Min:97.7 °F (36.5 °C), Max:98.8 °F (37.1 °C)    Intake/Output Summary
Gave report to Sandi at [facility].  Patient is stable and ready for discharge.   Transport scheduled for 2000
[facility] Hospitalist Group  Progress Note    Patient: Kate Vance Age: 72 y.o. : 1950 MR#: [id number] SSN: [id number]  Date: 2023       Subjective/24-hour events:     Patient seen and evaluated, lying in bed, no acute distress. Patient with a known history of foreign years gangrene, wound care on board, placed on wound VAC. Continue IV antibiotics. CTA with moderate burden pulmonary embolism, Eliquis held, per hematology if no further bleeding may initiate IV heparin. -H&H remained stable, 8.1/26.1, will start heparin gtt. Assessment:   Diverticulitis  GI bleed, w/acute blood loss anemia, diverticular  Bilateral pulmonary emboli   PAF  HTN  HLD  DM 2  CKD  ASHLEY on CPAP  Recent Melody's gangrene  Morbid obesity    Plan:   Hemoglobin stable at 8.1,  Maintain telemetry monitoring. Antibiotic therapy and wound care as ordered. Zosyn through  to complete treatment for Melody's gangrene. Keep eye on UOP, monitor for retention s/p Martin removal.  Lantus/SSI - blood sugars in good control overall. Adjust as necessary to maintain. PT/OT as tolerated. SNF disposition anticipated at discharge. Case discussed with:  [x]Patient  []Family  [x] Nursing  []Case Management  DVT Prophylaxis:  []Lovenox  [x]Hep SQ  []SCDs  []Coumadin   []On Heparin gtt []PO anticoagulant    Objective:   VS: /89   Pulse 75   Temp 97.4 °F (36.3 °C) (Oral)   Resp 22   Ht 5' 10\" (1.778 m)   Wt (!) 350 lb (158.8 kg)   SpO2 97%   BMI 50.22 kg/m²      Tmax/24hrs: Temp (24hrs), Av.6 °F (36.4 °C), Min:97.1 °F (36.2 °C), Max:98.5 °F (36.9 °C)    Intake/Output Summary (Last 24 hours) at 2023 1559  Last data filed at 2023 1034  Gross per 24 hour   Intake 10 ml   Output 1800 ml   Net -1790 ml         Gen:  Morbidly obese, In NAD. Lungs: Clear, no wheezes  Effort nonlabored. CV: RRR. Abdomen: Soft, NTTP. Extremities: Warm, no pitting edema or ischemia.   Neuro:
Noted ARU declined pt. Spoke with pt and he states he will just go back to [facility] at discharge. Spoke with April at [facility] and she states that they do have his bed available to return when he is medically ready. Patient concerned about how many Medicare SNF days he has left. April states he has only used 5 days so far. Updated pt.       [facility] RN MARGARITO  Case Management
Patient: Edvin Pena   MRN: [id number]         CSN: [id number]    YOB: 1950      Admit Date: 2023    Assessment:     Principal Problem:    Diverticulitis  Active Problems:    Bilateral pulmonary embolism ([facility]    Hypertension    Hyperlipidemia    Diabetes mellitus, type 2 (HCC)    ASHLEY on CPAP    CKD (chronic kidney disease)    Acute on chronic blood loss anemia    Acute kidney injury superimposed on chronic kidney disease (HCC)    History of Melody's gangrene    BPH (benign prostatic hyperplasia)    Paroxysmal atrial fibrillation ([facility]    Encounter for palliative care    Advanced care planning/counseling discussion    Anemia of chronic disease    Debility    Melody's gangrene of scrotum    Chronic atrial fibrillation (HCC)    Diverticulitis of colon  Resolved Problems:    * No resolved hospital problems.  *    Rahul pulm embolism 2020, on eliquis  Melody's gangrene 2023  CTA 23, moderate burden pulm embolism, DVT rt posterior tibial vein  Chronic anemia, multifactorial  Rectal bleed, diverticular bleed    Plan:     S/p venofer x2, prbc  IVC filter placed 23  On subcut heparin 5000 q 8h  If no ongoing bleeding, may initiate IV heparin  Scrotal wound care per urology  D/w pt    Skip Scott MD  [facility] [phone]      Subjective:     No active bleed    Objective:     /80   Pulse 60   Temp 97.9 °F (36.6 °C) (Oral)   Resp 18   Ht 5' 10\" (1.778 m)   Wt (!) 350 lb (158.8 kg)   SpO2 97%   BMI 50.22 kg/m²           Temp (24hrs), Av.8 °F (36.6 °C), Min:97.1 °F (36.2 °C), Max:98.4 °F (36.9 °C)        Intake/Output Summary (Last 24 hours) at 2023 0809  Last data filed at 2023 6978  Gross per 24 hour   Intake 960 ml   Output 3100 ml   Net -2140 ml     Review of Systems:   Constitutional: negative for fevers, chills, sweats and  positive for fatigue  Eyes: negative for visual disturbance,  icterus  Ears, Nose, Mouth, Throat,
Pt discharged with [facility] with all belongings including his phone and eye glasses in hand. Report given   PIV's removed   No wound vac attached   Pt alert,oriented, and stable denied any issues or concerns at this time   List of given scheduled medications provided to medical transport to give to receiving nurse.
Requested Case Management specialist to assist with transportation to [facility].  Address is [address] [address] [address] [address] [address] and phone number is [phone]  Patient will require BLS transport. Pt requires Stretcher If stretcher, reason: Melody's Gangrene surgery requiring wound vac, morbid obesity, impaired mobility, diverticulitis with GI bleed, bilat. Pulmonary emboli  Patient is currently requiring oxygen: NO  Height:5'10\"   Weight: 350 lbs  Pt is on isolation:  No  Is the pt ready now? yes  Requested time: next available  PCS Faxed: not yet  Insurance verified on face sheet: yes   Auth needed for transport: no  CM completed PCS/ Envelope and placed on chart.      [facility] RN CDCES  Case Management
Therapeutic Substitution per the P&T Committee approved Therapeutic Interchanges Policy     Nonformulary Medication Formulary Interchange   Metronidazole 500 mg q8h Metronidazole 500 mg q12h     Signed,   [facility]
Transition of Care Plan to SNF/Rehab    SNF/Rehab Transition:  Patient has been accepted to [facility] and meets criteria for admission. Patient will transported by medical transport and expected to leave at 8pm.    Communication to Patient/Family:  Met with patient and he are agreeable to the transition plan. Communication to SNF/Rehab:  Bedside RN, Montana Wolf, has been notified to update the transition plan to the facility and call report (phone number [phone]). Discharge information has been updated on the AVS.       Nursing Please include all hard scripts for controlled substances, med rec and dc summary, and AVS in packet. Reviewed and confirmed with facility, [facility], can manage the patient care needs for the following: Spoke to April at [facility] and she states that wound vac is available for pt at the facility. Niranjan with (X) only those applicable:    Medication:  [x]  Medications will be available at the facility  []  IV Antibiotics   []  Controlled Substance - hard copy to be sent with patient   []  Weekly Labs   Documents:  [] Hard RX  [] MAR  [] Kardex  [] AVS  []Transfer Summary  []Discharge   Equipment:  []  CPAP/BiPAP  [x]  Wound Vacuum  []  Martin or Urinary Device  []  PICC/Central Line  []  Nebulizer  []  Ventilator   Treatment:  []Isolation (for MRSA, VRE, etc.)  []Surgical Drain Management  []Tracheostomy Care  []Dressing Changes  []Dialysis with transportation and chair time  []PEG Care  []Oxygen  []Daily Weights for Heart Failure   Dietary:  []Any diet limitations  []Tube Feedings   []Total Parenteral Management (TPN)   Eligible for Medicaid Long Term Services and Supports  Yes:  [] Eligible for medical assistance or will become eligible within 180 days and UAI completed. [] Provider/Patient and/or support system has requested screening.   [] UAI copy provided to patient or responsible party,  [] UAI unavailable at discharge will send once processed to SNF
Urology Progress Note        Assessment/Plan:     Patient Active Problem List   Diagnosis    Bilateral pulmonary embolism (HCC)    Hypertension    Hyperlipidemia    Diabetes mellitus, type 2 (HCC)    ASHLEY on CPAP    Hepatitis C    CKD (chronic kidney disease)    Acute on chronic blood loss anemia    Acute kidney injury superimposed on chronic kidney disease (HCC)    History of Melody's gangrene    BPH (benign prostatic hyperplasia)    Paroxysmal atrial fibrillation (HCC)    Diverticulitis    Encounter for palliative care    Advanced care planning/counseling discussion    Anemia of chronic disease    Debility    Melody's gangrene of scrotum    Chronic atrial fibrillation ([address]    Diverticulitis of colon       ASSESSMENT:   H/o Melody's gangrene  S/p I&D 4/12/23 by Dr. Abelino Lamb  Findings: Large approximately 4 x 4 cm area of necrotic tissue on the left scrotum, significant pus and some necrotic tissue in the left hemiscrotum  S/p scrotal exploration, debridement and wound vac placement on 04/14/23  S/p I&D of Left scrotal wall abscess, Wound vac exchange 4/19/23  Findings: Minimal necrotic tissue at the inferior skin edge. Some necrotic tissue in the right side of wound cavity. No significant signs of necrotizing fasciitis  S/p Left scrotal wound vac exchange under anesthesia, Scrotal wound debridement 4/24/23 by Dr. Winston Tilley  Findings: Overall healthy appearing scrotal wound. Minimal necrotic tissue at the skin edge in the inferior aspect of the wound which was excised. New wound VAC placed                 -Discharged on 4/28 with wound vac in place  - CTA A/P 5/4: Enlarged prostate gland, 5.5 x 5.7 cm, indenting urinary bladder. Bladder collapsed around Martin catheter balloon. Minimal fat stranding at included perineum at left upper scrotum. The entire perineum is not included on the scan. No gas or abscess in the included soft tissues.      -WBC 3.6<4.1<4.6     Acute on chronic CKD 3b              Cr
Urology Progress Note        Assessment/Plan:     Patient Active Problem List   Diagnosis    Bilateral pulmonary embolism (HCC)    Hypertension    Hyperlipidemia    Diabetes mellitus, type 2 (HCC)    ASHLEY on CPAP    Hepatitis C    CKD (chronic kidney disease)    Acute on chronic blood loss anemia    Acute kidney injury superimposed on chronic kidney disease (HCC)    History of Melody's gangrene    BPH (benign prostatic hyperplasia)    Paroxysmal atrial fibrillation (HCC)    Diverticulitis    Encounter for palliative care    Advanced care planning/counseling discussion    Anemia of chronic disease    Debility    Melody's gangrene of scrotum    Chronic atrial fibrillation ([address]    Diverticulitis of colon       ASSESSMENT:   H/o Melody's gangrene  S/p I&D 4/12/23 by Dr. Adilia Whitman  Findings: Large approximately 4 x 4 cm area of necrotic tissue on the left scrotum, significant pus and some necrotic tissue in the left hemiscrotum  S/p scrotal exploration, debridement and wound vac placement on 04/14/23  S/p I&D of Left scrotal wall abscess, Wound vac exchange 4/19/23  Findings: Minimal necrotic tissue at the inferior skin edge. Some necrotic tissue in the right side of wound cavity. No significant signs of necrotizing fasciitis  S/p Left scrotal wound vac exchange under anesthesia, Scrotal wound debridement 4/24/23 by Dr. Dionne Medrano  Findings: Overall healthy appearing scrotal wound. Minimal necrotic tissue at the skin edge in the inferior aspect of the wound which was excised. New wound VAC placed      -Discharged on 4/28 without wound vac in place, not placed by facility until 5/3 but removed prior to admission  - CTA A/P 5/4: Enlarged prostate gland, 5.5 x 5.7 cm, indenting urinary bladder. Bladder collapsed around Martin catheter balloon. Minimal fat stranding at included perineum at left upper scrotum. The entire perineum is not included on the scan. No gas or abscess in the included soft tissues.      -WBC
3.6<4.1<4.6     Acute on chronic CKD 3b              Cr 1.30< 1.40 < 1.71     Diverticulitis of distal L colon  Acute on chronic blood loss anemia               Bilateral PE on Eliquis- has had PE since 20   S/p IVC filter placement   A.fib on Eliquis   T2DM     PLAN:    Appreciate management per primary  GI and hematology following. Abx per ID- on Zosyn until   Wound vac placed on 23 at bedside. Primofit in place with clear yellow output. If patient has any leakage from Primofit, would recommend replacing church catheter to prevent urine from draining into wound/wound vac. Monitor bladder scans   Following      Matt Patel PA-C  [facility]  Pager: [phone] M-F 8 AM - 5 PM  After Hours: [phone]        Subjective:     Daily Progress Note: 2023 1:12 PM    David Correa is doing well. Tolerated wound vac placement. He is voiding without difficulty. States he noticed some leakage from primofit yesterday. Objective:     /89   Pulse 75   Temp 97.4 °F (36.3 °C) (Oral)   Resp 22   Ht 5' 10\" (1.778 m)   Wt (!) 350 lb (158.8 kg)   SpO2 97%   BMI 50.22 kg/m²      Temp (24hrs), Av.6 °F (36.4 °C), Min:97.1 °F (36.2 °C), Max:98.5 °F (36.9 °C)      Intake and Output:  1901 -  0700  In: 240 [P.O.:240]  Out: 3000 [Urine:3000]  701 - 1900  In: 10 [I.V.:10]  Out: -     PHYSICAL EXAMINATION:   /89   Pulse 75   Temp 97.4 °F (36.3 °C) (Oral)   Resp 22   Ht 5' 10\" (1.778 m)   Wt (!) 350 lb (158.8 kg)   SpO2 97%   BMI 50.22 kg/m²   Constitutional: Well developed, well nourished. No acute distress. HEENT: Normocephalic, Atraumatic, EOM's intact   Respiratory: No respiratory distress or difficulties breathing   Abdomen:  Soft and non-tender   Male: Primofit with clear yellow output, no noted leakage today. Wound vac in place with good seal. No surrounding TTP or crepitus. Skin: No evidence of jaundice.   Normal
Output 925 ml   Net -805 ml         Gen:  Morbidly obese, In NAD. Lungs: Clear, no wheezes  Effort nonlabored. CV: RRR. Abdomen: Soft, NTTP. Extremities: Warm, no pitting edema or ischemia. Neuro:  Awake and alert, moves extremities spontaneously.     Current Facility-Administered Medications   Medication Dose Route Frequency    heparin 25,000 unit in sodium chloride 0.45% 250 mL (premix) infusion  5-30 Units/kg/hr IntraVENous Continuous    heparin (porcine) PF injection 10,000 Units  10,000 Units IntraVENous PRN    heparin (porcine) PF injection 5,000 Units  5,000 Units IntraVENous PRN    dilTIAZem (CARDIZEM CD) extended release capsule 120 mg  120 mg Oral Daily    insulin glargine (LANTUS) injection vial 15 Units  15 Units SubCUTAneous Nightly    0.9 % sodium chloride infusion   IntraVENous PRN    atorvastatin (LIPITOR) tablet 40 mg  40 mg Oral Nightly    [Held by provider] losartan (COZAAR) tablet 100 mg  100 mg Oral Daily    tamsulosin (FLOMAX) capsule 0.4 mg  0.4 mg Oral Daily    sodium chloride flush 0.9 % injection 5-40 mL  5-40 mL IntraVENous 2 times per day    sodium chloride flush 0.9 % injection 5-40 mL  5-40 mL IntraVENous PRN    ondansetron (ZOFRAN-ODT) disintegrating tablet 4 mg  4 mg Oral Q8H PRN    Or    ondansetron (ZOFRAN) injection 4 mg  4 mg IntraVENous Q6H PRN    polyethylene glycol (GLYCOLAX) packet 17 g  17 g Oral Daily PRN    acetaminophen (TYLENOL) tablet 650 mg  650 mg Oral Q6H PRN    Or    acetaminophen (TYLENOL) suppository 650 mg  650 mg Rectal Q6H PRN    0.9 % sodium chloride infusion   IntraVENous PRN    piperacillin-tazobactam (ZOSYN) 3,375 mg in sodium chloride 0.9 % 50 mL IVPB (mini-bag)  3,375 mg IntraVENous Q8H    insulin lispro (HUMALOG) injection vial 0-8 Units  0-8 Units SubCUTAneous 4x Daily AC & HS    glucose chewable tablet 16 g  4 tablet Oral PRN    dextrose bolus 10% 125 mL  125 mL IntraVENous PRN    Or    dextrose bolus 10% 250 mL  250 mL IntraVENous PRN    glucagon
IntraVENous Continuous PRN    oxyCODONE-acetaminophen (PERCOCET) 5-325 MG per tablet 1 tablet  1 tablet Oral Q4H PRN    naloxone (NARCAN) injection 0.4 mg  0.4 mg IntraVENous PRN    pantoprazole (PROTONIX) 40 mg in sodium chloride (PF) 0.9 % 10 mL injection  40 mg IntraVENous Q12H        Labs:    Recent Results (from the past 24 hour(s))   POCT Glucose    Collection Time: 05/10/23  3:57 PM   Result Value Ref Range    POC Glucose 164 (H) 70 - 110 mg/dL   POCT Glucose    Collection Time: 05/10/23  8:59 PM   Result Value Ref Range    POC Glucose 232 (H) 70 - 110 mg/dL   CBC    Collection Time: 05/11/23 12:09 AM   Result Value Ref Range    WBC 5.6 4.6 - 13.2 K/uL    RBC 2.48 (L) 4.35 - 5.65 M/uL    Hemoglobin 7.4 (L) 13.0 - 16.0 g/dL    Hematocrit 24.1 (L) 36.0 - 48.0 %    MCV 97.2 78.0 - 100.0 FL    MCH 29.8 24.0 - 34.0 PG    MCHC 30.7 (L) 31.0 - 37.0 g/dL    RDW 17.0 (H) 11.6 - 14.5 %    Platelets 845 605 - 718 K/uL    MPV 10.1 9.2 - 11.8 FL    Nucleated RBCs 0.0 0  WBC    nRBC 0.00 0.00 - 0.01 K/uL   APTT    Collection Time: 05/11/23 12:09 AM   Result Value Ref Range    PTT 72.1 (H) 23.0 - 36.4 SEC   POCT Glucose    Collection Time: 05/11/23  7:32 AM   Result Value Ref Range    POC Glucose 155 (H) 70 - 110 mg/dL   APTT    Collection Time: 05/11/23  9:22 AM   Result Value Ref Range    .2 (H) 23.0 - 36.4 SEC   POCT Glucose    Collection Time: 05/11/23 11:11 AM   Result Value Ref Range    POC Glucose 159 (H) 70 - 110 mg/dL         Signed By: Tk Mcclain MD     May 11, 2023
PRN    glucagon (rDNA) injection 1 mg  1 mg SubCUTAneous PRN    dextrose 10 % infusion   IntraVENous Continuous PRN    oxyCODONE-acetaminophen (PERCOCET) 5-325 MG per tablet 1 tablet  1 tablet Oral Q4H PRN    naloxone (NARCAN) injection 0.4 mg  0.4 mg IntraVENous PRN    pantoprazole (PROTONIX) 40 mg in sodium chloride (PF) 0.9 % 10 mL injection  40 mg IntraVENous Q12H       Allergies: Patient has no known allergies. Family History   Problem Relation Age of Onset    Hypertension Mother      Social History     Socioeconomic History    Marital status:      Spouse name: Not on file    Number of children: Not on file    Years of education: Not on file    Highest education level: Not on file   Occupational History    Not on file   Tobacco Use    Smoking status: Not on file    Smokeless tobacco: Not on file   Substance and Sexual Activity    Alcohol use: Not on file    Drug use: Not on file    Sexual activity: Not on file   Other Topics Concern    Not on file   Social History Narrative    Not on file     Social Determinants of Health     Financial Resource Strain: Not on file   Food Insecurity: Not on file   Transportation Needs: Not on file   Physical Activity: Not on file   Stress: Not on file   Social Connections: Not on file   Intimate Partner Violence: Not on file   Housing Stability: Not on file     Social History     Tobacco Use   Smoking Status Not on file   Smokeless Tobacco Not on file        Temp (24hrs), Av.9 °F (36.6 °C), Min:97.4 °F (36.3 °C), Max:98.9 °F (37.2 °C)    BP (!) 152/91   Pulse 60   Temp 98.1 °F (36.7 °C) (Oral)   Resp 18   Ht 5' 10\" (1.778 m)   Wt (!) 350 lb (158.8 kg)   SpO2 100%   BMI 50.22 kg/m²     ROS: 12 point ROS obtained in details.  Pertinent positives as mentioned in HPI,   otherwise negative    Physical Exam:    General: NAD, appears stated age, alert  Skin: warm, dry  Eyes:  sclera is non-icteric  HENT: normocephalic/atraumatic, moist mucus membranes  Respiratory:
     Spouse name: Not on file    Number of children: Not on file    Years of education: Not on file    Highest education level: Not on file   Occupational History    Not on file   Tobacco Use    Smoking status: Not on file    Smokeless tobacco: Not on file   Substance and Sexual Activity    Alcohol use: Not on file    Drug use: Not on file    Sexual activity: Not on file   Other Topics Concern    Not on file   Social History Narrative    Not on file     Social Determinants of Health     Financial Resource Strain: Not on file   Food Insecurity: Not on file   Transportation Needs: Not on file   Physical Activity: Not on file   Stress: Not on file   Social Connections: Not on file   Intimate Partner Violence: Not on file   Housing Stability: Not on file     Social History     Tobacco Use   Smoking Status Not on file   Smokeless Tobacco Not on file        Temp (24hrs), Av.8 °F (36.6 °C), Min:97.1 °F (36.2 °C), Max:98.4 °F (36.9 °C)    /80   Pulse 60   Temp 97.9 °F (36.6 °C) (Oral)   Resp 18   Ht 5' 10\" (1.778 m)   Wt (!) 350 lb (158.8 kg)   SpO2 97%   BMI 50.22 kg/m²     ROS: 12 point ROS obtained in details.  Pertinent positives as mentioned in HPI,   otherwise negative    Physical Exam:    General: NAD, appears stated age, alert  Skin: warm, dry  Eyes:  sclera is non-icteric  HENT: normocephalic/atraumatic, moist mucus membranes  Respiratory: CTA with no signs of respiratory distress  Cardiovascular: RRR,  no cyanosis or peripheral edema of extremities  GI: soft, non-tender, normal bowel sounds  : church removed, surgical site scrotum with red granulation tissue, no purulence  Neuro: moves all extremities, no focal deficits, normal speech  Psych: appropriate mood and affect, no visual or auditory hallucinations    Labs: Results:   Chemistry Recent Labs     23  0220 23  0344   GLUCOSE 114* 93    141   K 3.7 3.5   * 113*   CO2 24 25   BUN 14 8   CREATININE 1.35* 1.30

## 2024-07-26 ENCOUNTER — APPOINTMENT (OUTPATIENT)
Facility: HOSPITAL | Age: 74
End: 2024-07-26
Payer: MEDICARE

## 2024-07-26 ENCOUNTER — HOSPITAL ENCOUNTER (INPATIENT)
Facility: HOSPITAL | Age: 74
LOS: 13 days | Discharge: ANOTHER HOSPITAL | End: 2024-08-09
Attending: EMERGENCY MEDICINE | Admitting: STUDENT IN AN ORGANIZED HEALTH CARE EDUCATION/TRAINING PROGRAM
Payer: MEDICARE

## 2024-07-26 DIAGNOSIS — I50.33 ACUTE ON CHRONIC DIASTOLIC CONGESTIVE HEART FAILURE (HCC): ICD-10-CM

## 2024-07-26 DIAGNOSIS — I39 ENDOCARDITIS AND HEART VALVE DISORD IN DIS CLASSD ELSWHR: ICD-10-CM

## 2024-07-26 DIAGNOSIS — D50.0 IRON DEFICIENCY ANEMIA DUE TO CHRONIC BLOOD LOSS: ICD-10-CM

## 2024-07-26 DIAGNOSIS — K92.2 LOWER GI BLEED: Primary | ICD-10-CM

## 2024-07-26 DIAGNOSIS — I50.9 ACUTE CONGESTIVE HEART FAILURE, UNSPECIFIED HEART FAILURE TYPE (HCC): ICD-10-CM

## 2024-07-26 DIAGNOSIS — J44.1 ACUTE EXACERBATION OF CHRONIC OBSTRUCTIVE PULMONARY DISEASE (COPD) (HCC): ICD-10-CM

## 2024-07-26 DIAGNOSIS — I35.1 SEVERE AORTIC REGURGITATION: ICD-10-CM

## 2024-07-26 LAB
ALBUMIN SERPL-MCNC: 2.6 G/DL (ref 3.4–5)
ALBUMIN/GLOB SERPL: 0.6 (ref 0.8–1.7)
ALP SERPL-CCNC: 93 U/L (ref 45–117)
ALT SERPL-CCNC: 14 U/L (ref 16–61)
ANION GAP SERPL CALC-SCNC: 10 MMOL/L (ref 3–18)
AST SERPL-CCNC: 13 U/L (ref 10–38)
BASOPHILS # BLD: 0 K/UL (ref 0–0.1)
BASOPHILS NFR BLD: 0 % (ref 0–2)
BILIRUB SERPL-MCNC: 0.5 MG/DL (ref 0.2–1)
BUN SERPL-MCNC: 19 MG/DL (ref 7–18)
BUN/CREAT SERPL: 12 (ref 12–20)
CALCIUM SERPL-MCNC: 8.9 MG/DL (ref 8.5–10.1)
CHLORIDE SERPL-SCNC: 112 MMOL/L (ref 100–111)
CO2 SERPL-SCNC: 23 MMOL/L (ref 21–32)
CREAT SERPL-MCNC: 1.56 MG/DL (ref 0.6–1.3)
DIFFERENTIAL METHOD BLD: ABNORMAL
EOSINOPHIL # BLD: 0.1 K/UL (ref 0–0.4)
EOSINOPHIL NFR BLD: 1 % (ref 0–5)
ERYTHROCYTE [DISTWIDTH] IN BLOOD BY AUTOMATED COUNT: 17.2 % (ref 11.6–14.5)
FLUAV AG NPH QL IA: NEGATIVE
FLUBV AG NOSE QL IA: NEGATIVE
GLOBULIN SER CALC-MCNC: 4.5 G/DL (ref 2–4)
GLUCOSE SERPL-MCNC: 143 MG/DL (ref 74–99)
HCT VFR BLD AUTO: 23.9 % (ref 36–48)
HGB BLD-MCNC: 7.1 G/DL (ref 13–16)
IMM GRANULOCYTES # BLD AUTO: 0.1 K/UL (ref 0–0.04)
IMM GRANULOCYTES NFR BLD AUTO: 1 % (ref 0–0.5)
LYMPHOCYTES # BLD: 0.7 K/UL (ref 0.9–3.6)
LYMPHOCYTES NFR BLD: 10 % (ref 21–52)
MCH RBC QN AUTO: 25.4 PG (ref 24–34)
MCHC RBC AUTO-ENTMCNC: 29.7 G/DL (ref 31–37)
MCV RBC AUTO: 85.4 FL (ref 78–100)
MONOCYTES # BLD: 0.5 K/UL (ref 0.05–1.2)
MONOCYTES NFR BLD: 6 % (ref 3–10)
NEUTS SEG # BLD: 6.1 K/UL (ref 1.8–8)
NEUTS SEG NFR BLD: 82 % (ref 40–73)
NRBC # BLD: 0 K/UL (ref 0–0.01)
NRBC BLD-RTO: 0 PER 100 WBC
PLATELET # BLD AUTO: 264 K/UL (ref 135–420)
PMV BLD AUTO: 10.6 FL (ref 9.2–11.8)
POTASSIUM SERPL-SCNC: 3.4 MMOL/L (ref 3.5–5.5)
PROT SERPL-MCNC: 7.1 G/DL (ref 6.4–8.2)
RBC # BLD AUTO: 2.8 M/UL (ref 4.35–5.65)
SARS-COV-2 RDRP RESP QL NAA+PROBE: NOT DETECTED
SODIUM SERPL-SCNC: 145 MMOL/L (ref 136–145)
SOURCE: NORMAL
WBC # BLD AUTO: 7.5 K/UL (ref 4.6–13.2)

## 2024-07-26 PROCEDURE — 93005 ELECTROCARDIOGRAM TRACING: CPT | Performed by: EMERGENCY MEDICINE

## 2024-07-26 PROCEDURE — 71045 X-RAY EXAM CHEST 1 VIEW: CPT

## 2024-07-26 PROCEDURE — 85025 COMPLETE CBC W/AUTO DIFF WBC: CPT

## 2024-07-26 PROCEDURE — 96374 THER/PROPH/DIAG INJ IV PUSH: CPT

## 2024-07-26 PROCEDURE — 87804 INFLUENZA ASSAY W/OPTIC: CPT

## 2024-07-26 PROCEDURE — 6360000002 HC RX W HCPCS: Performed by: EMERGENCY MEDICINE

## 2024-07-26 PROCEDURE — 87635 SARS-COV-2 COVID-19 AMP PRB: CPT

## 2024-07-26 PROCEDURE — 80053 COMPREHEN METABOLIC PANEL: CPT

## 2024-07-26 PROCEDURE — 99285 EMERGENCY DEPT VISIT HI MDM: CPT

## 2024-07-26 RX ORDER — SODIUM CHLORIDE 0.9 % (FLUSH) 0.9 %
5-40 SYRINGE (ML) INJECTION PRN
Status: CANCELLED | OUTPATIENT
Start: 2024-07-26

## 2024-07-26 RX ORDER — ONDANSETRON 2 MG/ML
4 INJECTION INTRAMUSCULAR; INTRAVENOUS EVERY 6 HOURS PRN
Status: CANCELLED | OUTPATIENT
Start: 2024-07-26

## 2024-07-26 RX ORDER — POTASSIUM CHLORIDE 20 MEQ/1
40 TABLET, EXTENDED RELEASE ORAL PRN
Status: CANCELLED | OUTPATIENT
Start: 2024-07-26

## 2024-07-26 RX ORDER — ENOXAPARIN SODIUM 100 MG/ML
30 INJECTION SUBCUTANEOUS 2 TIMES DAILY
Status: CANCELLED | OUTPATIENT
Start: 2024-07-26

## 2024-07-26 RX ORDER — SODIUM CHLORIDE 9 MG/ML
INJECTION, SOLUTION INTRAVENOUS CONTINUOUS
Status: CANCELLED | OUTPATIENT
Start: 2024-07-26

## 2024-07-26 RX ORDER — FUROSEMIDE 10 MG/ML
20 INJECTION INTRAMUSCULAR; INTRAVENOUS
Status: COMPLETED | OUTPATIENT
Start: 2024-07-26 | End: 2024-07-26

## 2024-07-26 RX ORDER — ACETAMINOPHEN 325 MG/1
650 TABLET ORAL EVERY 6 HOURS PRN
Status: CANCELLED | OUTPATIENT
Start: 2024-07-26

## 2024-07-26 RX ORDER — SODIUM CHLORIDE 0.9 % (FLUSH) 0.9 %
5-40 SYRINGE (ML) INJECTION EVERY 12 HOURS SCHEDULED
Status: CANCELLED | OUTPATIENT
Start: 2024-07-26

## 2024-07-26 RX ORDER — SODIUM CHLORIDE 9 MG/ML
INJECTION, SOLUTION INTRAVENOUS PRN
Status: CANCELLED | OUTPATIENT
Start: 2024-07-26

## 2024-07-26 RX ORDER — POTASSIUM CHLORIDE 7.45 MG/ML
10 INJECTION INTRAVENOUS PRN
Status: CANCELLED | OUTPATIENT
Start: 2024-07-26

## 2024-07-26 RX ORDER — POLYETHYLENE GLYCOL 3350 17 G/17G
17 POWDER, FOR SOLUTION ORAL DAILY PRN
Status: CANCELLED | OUTPATIENT
Start: 2024-07-26

## 2024-07-26 RX ORDER — FINASTERIDE 5 MG/1
5 TABLET, FILM COATED ORAL DAILY
COMMUNITY

## 2024-07-26 RX ORDER — ACETAMINOPHEN 650 MG/1
650 SUPPOSITORY RECTAL EVERY 6 HOURS PRN
Status: CANCELLED | OUTPATIENT
Start: 2024-07-26

## 2024-07-26 RX ORDER — LANOLIN ALCOHOL/MO/W.PET/CERES
3 CREAM (GRAM) TOPICAL NIGHTLY
Status: CANCELLED | OUTPATIENT
Start: 2024-07-26

## 2024-07-26 RX ORDER — MAGNESIUM SULFATE IN WATER 40 MG/ML
2000 INJECTION, SOLUTION INTRAVENOUS PRN
Status: CANCELLED | OUTPATIENT
Start: 2024-07-26

## 2024-07-26 RX ORDER — ONDANSETRON 4 MG/1
4 TABLET, ORALLY DISINTEGRATING ORAL EVERY 8 HOURS PRN
Status: CANCELLED | OUTPATIENT
Start: 2024-07-26

## 2024-07-26 RX ADMIN — FUROSEMIDE 20 MG: 10 INJECTION, SOLUTION INTRAMUSCULAR; INTRAVENOUS at 22:21

## 2024-07-26 ASSESSMENT — LIFESTYLE VARIABLES
HOW OFTEN DO YOU HAVE A DRINK CONTAINING ALCOHOL: NEVER
HOW MANY STANDARD DRINKS CONTAINING ALCOHOL DO YOU HAVE ON A TYPICAL DAY: PATIENT DOES NOT DRINK

## 2024-07-26 ASSESSMENT — PAIN - FUNCTIONAL ASSESSMENT: PAIN_FUNCTIONAL_ASSESSMENT: NONE - DENIES PAIN

## 2024-07-27 PROBLEM — K92.2 GI BLEED: Status: ACTIVE | Noted: 2024-07-27

## 2024-07-27 LAB
EKG ATRIAL RATE: 75 BPM
EKG DIAGNOSIS: NORMAL
EKG P AXIS: 73 DEGREES
EKG P-R INTERVAL: 218 MS
EKG Q-T INTERVAL: 400 MS
EKG QRS DURATION: 108 MS
EKG QTC CALCULATION (BAZETT): 446 MS
EKG R AXIS: -19 DEGREES
EKG T AXIS: 104 DEGREES
EKG VENTRICULAR RATE: 75 BPM
GLUCOSE BLD STRIP.AUTO-MCNC: 142 MG/DL (ref 70–110)
GLUCOSE BLD STRIP.AUTO-MCNC: 142 MG/DL (ref 70–110)
GLUCOSE BLD STRIP.AUTO-MCNC: 148 MG/DL (ref 70–110)
GLUCOSE BLD STRIP.AUTO-MCNC: 165 MG/DL (ref 70–110)
HCT VFR BLD AUTO: 23.2 % (ref 36–48)
HCT VFR BLD AUTO: 24.5 % (ref 36–48)
HCT VFR BLD AUTO: 25.7 % (ref 36–48)
HEMOCCULT STL QL: POSITIVE
HGB BLD-MCNC: 6.6 G/DL (ref 13–16)
HGB BLD-MCNC: 6.9 G/DL (ref 13–16)
HGB BLD-MCNC: 7.6 G/DL (ref 13–16)
HISTORY CHECK: NORMAL
NT PRO BNP: 3431 PG/ML (ref 0–900)

## 2024-07-27 PROCEDURE — 1100000000 HC RM PRIVATE

## 2024-07-27 PROCEDURE — 85014 HEMATOCRIT: CPT

## 2024-07-27 PROCEDURE — 86850 RBC ANTIBODY SCREEN: CPT

## 2024-07-27 PROCEDURE — 99223 1ST HOSP IP/OBS HIGH 75: CPT | Performed by: INTERNAL MEDICINE

## 2024-07-27 PROCEDURE — P9016 RBC LEUKOCYTES REDUCED: HCPCS

## 2024-07-27 PROCEDURE — 86923 COMPATIBILITY TEST ELECTRIC: CPT

## 2024-07-27 PROCEDURE — 82962 GLUCOSE BLOOD TEST: CPT

## 2024-07-27 PROCEDURE — 36430 TRANSFUSION BLD/BLD COMPNT: CPT

## 2024-07-27 PROCEDURE — 6370000000 HC RX 637 (ALT 250 FOR IP): Performed by: STUDENT IN AN ORGANIZED HEALTH CARE EDUCATION/TRAINING PROGRAM

## 2024-07-27 PROCEDURE — 83880 ASSAY OF NATRIURETIC PEPTIDE: CPT

## 2024-07-27 PROCEDURE — 6360000002 HC RX W HCPCS: Performed by: INTERNAL MEDICINE

## 2024-07-27 PROCEDURE — 94761 N-INVAS EAR/PLS OXIMETRY MLT: CPT

## 2024-07-27 PROCEDURE — 85018 HEMOGLOBIN: CPT

## 2024-07-27 PROCEDURE — 82270 OCCULT BLOOD FECES: CPT

## 2024-07-27 PROCEDURE — 93010 ELECTROCARDIOGRAM REPORT: CPT | Performed by: INTERNAL MEDICINE

## 2024-07-27 PROCEDURE — 86901 BLOOD TYPING SEROLOGIC RH(D): CPT

## 2024-07-27 PROCEDURE — 2580000003 HC RX 258: Performed by: INTERNAL MEDICINE

## 2024-07-27 PROCEDURE — 86900 BLOOD TYPING SEROLOGIC ABO: CPT

## 2024-07-27 PROCEDURE — 30233N1 TRANSFUSION OF NONAUTOLOGOUS RED BLOOD CELLS INTO PERIPHERAL VEIN, PERCUTANEOUS APPROACH: ICD-10-PCS | Performed by: INTERNAL MEDICINE

## 2024-07-27 PROCEDURE — 36415 COLL VENOUS BLD VENIPUNCTURE: CPT

## 2024-07-27 PROCEDURE — 6370000000 HC RX 637 (ALT 250 FOR IP): Performed by: INTERNAL MEDICINE

## 2024-07-27 RX ORDER — SODIUM CHLORIDE 0.9 % (FLUSH) 0.9 %
5-40 SYRINGE (ML) INJECTION PRN
Status: DISCONTINUED | OUTPATIENT
Start: 2024-07-27 | End: 2024-08-09 | Stop reason: HOSPADM

## 2024-07-27 RX ORDER — ACETAMINOPHEN 650 MG/1
650 SUPPOSITORY RECTAL EVERY 6 HOURS PRN
Status: DISCONTINUED | OUTPATIENT
Start: 2024-07-27 | End: 2024-08-09 | Stop reason: HOSPADM

## 2024-07-27 RX ORDER — DEXTROSE MONOHYDRATE 100 MG/ML
INJECTION, SOLUTION INTRAVENOUS CONTINUOUS PRN
Status: DISCONTINUED | OUTPATIENT
Start: 2024-07-27 | End: 2024-08-09 | Stop reason: HOSPADM

## 2024-07-27 RX ORDER — INSULIN LISPRO 100 [IU]/ML
0-4 INJECTION, SOLUTION INTRAVENOUS; SUBCUTANEOUS NIGHTLY
Status: DISCONTINUED | OUTPATIENT
Start: 2024-07-27 | End: 2024-08-09 | Stop reason: HOSPADM

## 2024-07-27 RX ORDER — ERGOCALCIFEROL 1.25 MG/1
50000 CAPSULE ORAL WEEKLY
Status: DISCONTINUED | OUTPATIENT
Start: 2024-07-27 | End: 2024-08-09 | Stop reason: HOSPADM

## 2024-07-27 RX ORDER — FLUTICASONE PROPIONATE 50 MCG
1 SPRAY, SUSPENSION (ML) NASAL DAILY
Status: DISCONTINUED | OUTPATIENT
Start: 2024-07-27 | End: 2024-08-09 | Stop reason: HOSPADM

## 2024-07-27 RX ORDER — SODIUM CHLORIDE 9 MG/ML
INJECTION, SOLUTION INTRAVENOUS PRN
Status: DISCONTINUED | OUTPATIENT
Start: 2024-07-27 | End: 2024-08-09 | Stop reason: HOSPADM

## 2024-07-27 RX ORDER — ONDANSETRON 4 MG/1
4 TABLET, ORALLY DISINTEGRATING ORAL EVERY 8 HOURS PRN
Status: DISCONTINUED | OUTPATIENT
Start: 2024-07-27 | End: 2024-08-09 | Stop reason: HOSPADM

## 2024-07-27 RX ORDER — SODIUM CHLORIDE 0.9 % (FLUSH) 0.9 %
5-40 SYRINGE (ML) INJECTION EVERY 12 HOURS SCHEDULED
Status: DISCONTINUED | OUTPATIENT
Start: 2024-07-27 | End: 2024-08-09 | Stop reason: HOSPADM

## 2024-07-27 RX ORDER — HYDROCHLOROTHIAZIDE 25 MG/1
25 TABLET ORAL DAILY
Status: DISCONTINUED | OUTPATIENT
Start: 2024-07-27 | End: 2024-07-27

## 2024-07-27 RX ORDER — ACETAMINOPHEN 325 MG/1
650 TABLET ORAL EVERY 6 HOURS PRN
Status: DISCONTINUED | OUTPATIENT
Start: 2024-07-27 | End: 2024-08-09 | Stop reason: HOSPADM

## 2024-07-27 RX ORDER — INSULIN GLARGINE 100 [IU]/ML
20 INJECTION, SOLUTION SUBCUTANEOUS
Status: DISCONTINUED | OUTPATIENT
Start: 2024-07-27 | End: 2024-08-09 | Stop reason: HOSPADM

## 2024-07-27 RX ORDER — INSULIN GLARGINE 100 [IU]/ML
45 INJECTION, SOLUTION SUBCUTANEOUS
Status: DISCONTINUED | OUTPATIENT
Start: 2024-07-27 | End: 2024-07-27

## 2024-07-27 RX ORDER — ACETAMINOPHEN 325 MG/1
650 TABLET ORAL EVERY 4 HOURS PRN
Status: DISCONTINUED | OUTPATIENT
Start: 2024-07-27 | End: 2024-07-27

## 2024-07-27 RX ORDER — ATORVASTATIN CALCIUM 40 MG/1
40 TABLET, FILM COATED ORAL DAILY
Status: DISCONTINUED | OUTPATIENT
Start: 2024-07-27 | End: 2024-08-09 | Stop reason: HOSPADM

## 2024-07-27 RX ORDER — SODIUM CHLORIDE 9 MG/ML
INJECTION, SOLUTION INTRAVENOUS PRN
Status: DISCONTINUED | OUTPATIENT
Start: 2024-07-27 | End: 2024-08-04 | Stop reason: SDUPTHER

## 2024-07-27 RX ORDER — ONDANSETRON 2 MG/ML
4 INJECTION INTRAMUSCULAR; INTRAVENOUS EVERY 6 HOURS PRN
Status: DISCONTINUED | OUTPATIENT
Start: 2024-07-27 | End: 2024-08-09 | Stop reason: HOSPADM

## 2024-07-27 RX ORDER — AMLODIPINE BESYLATE 10 MG/1
10 TABLET ORAL DAILY
Status: DISCONTINUED | OUTPATIENT
Start: 2024-07-27 | End: 2024-07-28

## 2024-07-27 RX ORDER — FUROSEMIDE 20 MG/1
20 TABLET ORAL 2 TIMES DAILY
Status: DISCONTINUED | OUTPATIENT
Start: 2024-07-27 | End: 2024-07-27

## 2024-07-27 RX ORDER — INSULIN LISPRO 100 [IU]/ML
0-8 INJECTION, SOLUTION INTRAVENOUS; SUBCUTANEOUS
Status: DISCONTINUED | OUTPATIENT
Start: 2024-07-27 | End: 2024-08-09 | Stop reason: HOSPADM

## 2024-07-27 RX ORDER — DILTIAZEM HYDROCHLORIDE 120 MG/1
120 CAPSULE, COATED, EXTENDED RELEASE ORAL DAILY
Status: DISCONTINUED | OUTPATIENT
Start: 2024-07-27 | End: 2024-08-09 | Stop reason: HOSPADM

## 2024-07-27 RX ORDER — TRAMADOL HYDROCHLORIDE 50 MG/1
25 TABLET ORAL 3 TIMES DAILY PRN
Status: DISCONTINUED | OUTPATIENT
Start: 2024-07-27 | End: 2024-08-09 | Stop reason: HOSPADM

## 2024-07-27 RX ORDER — FUROSEMIDE 10 MG/ML
40 INJECTION INTRAMUSCULAR; INTRAVENOUS 2 TIMES DAILY
Status: DISCONTINUED | OUTPATIENT
Start: 2024-07-27 | End: 2024-07-27

## 2024-07-27 RX ORDER — FINASTERIDE 5 MG/1
5 TABLET, FILM COATED ORAL DAILY
Status: DISCONTINUED | OUTPATIENT
Start: 2024-07-27 | End: 2024-08-09 | Stop reason: HOSPADM

## 2024-07-27 RX ORDER — CETIRIZINE HYDROCHLORIDE 10 MG/1
10 TABLET ORAL DAILY
Status: DISCONTINUED | OUTPATIENT
Start: 2024-07-27 | End: 2024-08-09 | Stop reason: HOSPADM

## 2024-07-27 RX ORDER — SODIUM CHLORIDE 9 MG/ML
INJECTION, SOLUTION INTRAVENOUS CONTINUOUS
Status: DISCONTINUED | OUTPATIENT
Start: 2024-07-27 | End: 2024-07-27

## 2024-07-27 RX ORDER — TAMSULOSIN HYDROCHLORIDE 0.4 MG/1
0.4 CAPSULE ORAL DAILY
Status: DISCONTINUED | OUTPATIENT
Start: 2024-07-27 | End: 2024-08-09 | Stop reason: HOSPADM

## 2024-07-27 RX ADMIN — SODIUM CHLORIDE, PRESERVATIVE FREE 40 MG: 5 INJECTION INTRAVENOUS at 17:32

## 2024-07-27 RX ADMIN — HYDROCHLOROTHIAZIDE 25 MG: 25 TABLET ORAL at 09:15

## 2024-07-27 RX ADMIN — FINASTERIDE 5 MG: 5 TABLET, FILM COATED ORAL at 09:15

## 2024-07-27 RX ADMIN — ERGOCALCIFEROL 50000 UNITS: 1.25 CAPSULE ORAL at 09:15

## 2024-07-27 RX ADMIN — AMLODIPINE BESYLATE 10 MG: 10 TABLET ORAL at 09:15

## 2024-07-27 RX ADMIN — SODIUM CHLORIDE, PRESERVATIVE FREE 40 MG: 5 INJECTION INTRAVENOUS at 04:43

## 2024-07-27 RX ADMIN — CETIRIZINE HYDROCHLORIDE 10 MG: 10 TABLET, FILM COATED ORAL at 09:15

## 2024-07-27 RX ADMIN — SODIUM CHLORIDE, PRESERVATIVE FREE 10 ML: 5 INJECTION INTRAVENOUS at 09:18

## 2024-07-27 RX ADMIN — MUPIROCIN: 20 OINTMENT TOPICAL at 14:26

## 2024-07-27 RX ADMIN — INSULIN GLARGINE 20 UNITS: 100 INJECTION, SOLUTION SUBCUTANEOUS at 21:06

## 2024-07-27 RX ADMIN — DILTIAZEM HYDROCHLORIDE 120 MG: 120 CAPSULE, EXTENDED RELEASE ORAL at 09:15

## 2024-07-27 RX ADMIN — ATORVASTATIN CALCIUM 40 MG: 40 TABLET, FILM COATED ORAL at 09:15

## 2024-07-27 RX ADMIN — TAMSULOSIN HYDROCHLORIDE 0.4 MG: 0.4 CAPSULE ORAL at 09:15

## 2024-07-27 RX ADMIN — SODIUM CHLORIDE, PRESERVATIVE FREE 10 ML: 5 INJECTION INTRAVENOUS at 21:46

## 2024-07-27 ASSESSMENT — PAIN SCALES - GENERAL
PAINLEVEL_OUTOF10: 0

## 2024-07-27 NOTE — H&P
INTERNAL MEDICINE H & P    Patient:  Max Aly  MRN: [id number]    Consulting Physician: Vaibhav Winkler MD  Reason for Consult/Admit : Acute blood loss anemia  Primacy Care Physician: Louis, Claude, MD      Assessment and Plan       Acute CHF(diastolic) exacerbation  Acute GI bleed  KIRIT, likely cardiorenal syndrome  Chronic anemia  Hemoccult positive stool  History of BPH  History of Melody's gangrene  Hyperlipidemia  Type 2 diabetes  Hepatitis C  Paroxysmal atrial fibrillation on Eliquis  History of sick sinus syndrome status post pacemaker 12/2020  ASHLEY, unknown compliance  Recent episode of colitis 5/2024  Morbid obesity      Plan:  IV Lasix 40 mg IV twice daily, monitor daily weights, input and output  2D echo 5/24 showed a normal systolic function, indeterminate diastolic function  Type and screen, transfuse if hemoglobin is less than 7 g/dL  Telemetry monitor  Trend H&H every 6 hours  GI and Cardiology consult  Hold Eliquis  ACHS glucose monitoring  Telemetry monitor  Check iron studies  ACHS glucose monitoring, resume home insulin, hypoglycemic    DVT prophylaxis: SCD bilaterally, no chemoprophylaxis  GI prophylaxis: Protonix  Full code  Principal Problem:    GI bleed  Resolved Problems:    * No resolved hospital problems. *      HISTORY OF PRESENT ILLNESS:   Max Aly is a 74 y.o. male with multiple comorbidities including paroxysmal atrial fibrillation, diabetes, hepatitis C, BPH, morbid obesity, sleep apnea who presents from [facility] with a diagnosis of dyspnea acute blood loss anemia secondary to GI bleed.  He presents with cough, bilateral ankle swelling and dyspnea which has been ongoing for 1 week.  He went to his cardiologist office on Wednesday and was advised to seek medical attention.    He was found to have a hemoglobin of 7.1.  Hemoccult stool was positive.  He is significantly short of breath on our conversation.  He was given Lasix 20 mg in [facility]  chloride      dextrose       PRN Meds:sodium chloride flush, sodium chloride, ondansetron **OR** ondansetron, acetaminophen **OR** acetaminophen, glucose, dextrose bolus **OR** dextrose bolus, glucagon (rDNA), dextrose    Home Meds:   Prior to Admission medications    Medication Sig Start Date End Date Taking? Authorizing Provider   finasteride (PROSCAR) 5 MG tablet Take 1 tablet by mouth daily    Jade York MD   Blood Pressure Monitoring (BLOOD PRESSURE DIGITAL SOLN) KIT  1/26/24   Jade York MD   mupirocin (BACTROBAN) 2 % ointment APPLY TO AFFECTED AREA 3 TIMES A DAY FOR 10 DAYS 1/11/24   Jade York MD   amLODIPine (NORVASC) 10 MG tablet Take 1 tablet by mouth daily    Jade York MD   amoxicillin-clavulanate (AUGMENTIN) 875-125 MG per tablet PLEASE SEE ATTACHED FOR DETAILED DIRECTIONS 10/25/23   Jade York MD   vitamin D (ERGOCALCIFEROL) 1.25 MG (17755 UT) CAPS capsule Take 1 capsule by mouth once a week 9/10/23   Jade York MD   glipiZIDE (GLUCOTROL XL) 5 MG extended release tablet Take 2 tablets by mouth daily 9/24/23   Jade York MD   metFORMIN (GLUCOPHAGE) 1000 MG tablet 0.5 tablets 8/17/16   Provider, Historical, MD   Saw [place] 450 MG CAPS Take 2 capsules by mouth 2 times daily    Jade York MD   hydroCHLOROthiazide (HYDRODIURIL) 25 MG tablet Take 1 tablet by mouth daily 4/1/10   Jade York MD   ACCU-CHEK GUIDE strip USE TO CHECK BLOOD SUGAR ONCE DAILY 9/24/23   Jade York MD   dilTIAZem (CARDIZEM CD) 120 MG extended release capsule Take 1 capsule by mouth daily 5/13/23   Anthony Burr MD   fexofenadine (ALLEGRA) 180 MG tablet Take 1 tablet by mouth daily    Jade York MD   atorvastatin (LIPITOR) 40 MG tablet Take 1 tablet by mouth 1/4/23   Jade York MD   ELIQUIS 5 MG TABS tablet Take 1 tablet by mouth 2 times daily 2/13/23   Jade York MD   tamsulosin  (FLOMAX) 0.4 MG capsule Take 1 tablet by mouth daily 6/21/16   Jade York MD   fluticasone (FLONASE) 50 MCG/ACT nasal spray 1 spray by Each Nostril route daily 9/8/11   Jade York MD   insulin lispro (HUMALOG) 100 UNIT/ML SOLN injection vial Inject 15 Units into the skin 3 times daily (with meals)  Patient not taking: Reported on 11/2/2023 4/28/23   Jade York MD   insulin glargine (LANTUS) 100 UNIT/ML injection vial Inject 45 Units into the skin nightly  Patient not taking: Reported on 11/2/2023 4/28/23   Jade York MD   losartan (COZAAR) 100 MG tablet Take 1 tablet by mouth daily 2/20/09   Jade York MD   famotidine (PEPCID) 20 MG tablet Take 0.5 tablets by mouth 2 times daily    Jade York MD   traMADol (ULTRAM) 50 MG tablet Take 0.5 tablets by mouth 3 times daily as needed. 4/28/23   Jade York MD   acetaminophen (TYLENOL) 325 MG tablet Take 2 tablets by mouth every 4 hours as needed    Jade York MD       LABS:-  Recent Labs     07/26/24 2055   WBC 7.5   RBC 2.80*   HGB 7.1*   HCT 23.9*   MCV 85.4   MCH 25.4   MCHC 29.7*   RDW 17.2*      MPV 10.6      BMP:   Recent Labs     07/26/24 2055      K 3.4*   *   CO2 23   BUN 19*   CREATININE 1.56*   GLUCOSE 143*   CALCIUM 8.9            -----------------------------------------------------------------  CXR  Cardiomegaly with pulmonary congestion        EKG: Sinus rhythm with 1st degree AV block with LAD.        Vaibhav Winkler MD   5:05 AM 07/27/24   Attending physician

## 2024-07-27 NOTE — ED TRIAGE NOTES
`[facility] EMERGENCY DEPT  EMERGENCY DEPARTMENT ENCOUNTER       Pt Name: Max Aly  MRN: [id number]  Birthdate 1950 of evaluation: 7/26/2024  Provider:Bennett Barrientos MD     CHIEF COMPLAINT           HPI  Max Aly is a 74 y.o. male  c/o having a non productive cough x 1 week with worsening over the last few days and bilateral ankle swelling. Pt reports he saw his cardiologist on Wednesday. Pt reports cardiologist wasn't concerned about ankle swelling and told him to elevate his legs three times a day. Pt states cough is mostly dry with very little phlegm production. Denies any other URI symptoms. Pt is speaking in full sentences and NAD. 99% on room air.  Patient also C/O having rectal bleeding     ROS  Review of Systems   HENT: Negative.     Respiratory:  Positive for cough and shortness of breath.    Cardiovascular:  Positive for leg swelling (bilateral lower legs). Negative for chest pain.   Gastrointestinal:  Positive for anal bleeding.   Genitourinary: Negative.    Musculoskeletal:  Negative for joint swelling (lower legs).         Except as noted above the remainder of the review of systems was reviewed and negative.         PAST MEDICAL HISTORY      Past Medical History        Past Medical History:   Diagnosis Date    BPH (benign prostatic hyperplasia) 05/04/2023    CKD (chronic kidney disease) 05/04/2023    Colitis      Diabetes mellitus, type 2 (HCC) 05/04/2023    Hepatitis C 05/04/2023    History of Melody's gangrene 05/04/2023    Hyperlipidemia 05/04/2023    Hypertension 05/04/2023    ASHLEY on CPAP 05/04/2023    Paroxysmal atrial fibrillation (HCC) 05/04/2023            SURGICAL HISTORY        Past Surgical History         Past Surgical History:   Procedure Laterality Date    IR IVC FILTER PLACEMENT W IMAGING   5/5/2023     IR IVC FILTER PLACEMENT W IMAGING 5/5/2023 Bob Castañeda MD [facility] ANGIO IR    SCROTAL SURGERY Left              CURRENTMEDICATIONS             Previous

## 2024-07-27 NOTE — ED TRIAGE NOTES
Pt c/o cough and SOB since 1 week.    Pt stated \"that he been having a increased cough since 1 week\".    Pt has Hx of COPD.    Past Medical History:   Diagnosis Date    BPH (benign prostatic hyperplasia) 5/4/2023    CKD (chronic kidney disease) 5/4/2023    Diabetes mellitus, type 2 (HCC) 5/4/2023    Hepatitis C 5/4/2023    History of Melody's gangrene 5/4/2023    Hyperlipidemia 5/4/2023    Hypertension 5/4/2023    ASHLEY on CPAP 5/4/2023    Paroxysmal atrial fibrillation (HCC) 5/4/2023     Pt ambulated to the bed with an unsteady gait.

## 2024-07-27 NOTE — PROGRESS NOTES
4 Eyes Skin Assessment     NAME:  Max Aly  YOB: 1950  MEDICAL RECORD NUMBER:  [id number]    The patient is being assessed for  Admission    I agree that at least one RN has performed a thorough Head to Toe Skin Assessment on the patient. ALL assessment sites listed below have been assessed.      Areas assessed by both nurses:    Head, Face, Ears, Shoulders, Back, Chest, Arms, Elbows, Hands, Sacrum. Buttock, Coccyx, Ischium, Legs. Feet and Heels, and Under Medical Devices         Does the Patient have a Wound? No noted wound(s)       Him[place]hu Prevention initiated by RN: Yes  Wound Care Orders initiated by RN: No    Pressure Injury (Stage 3,4, Unstageable, DTI, NWPT, and Complex wounds) if present, place Wound referral order by RN under : No    New Ostomies, if present place, Ostomy referral order under : No     Nurse 1 eSignature: Electronically signed by Alta Naqvi RN on 7/27/24 at 5:45 AM EDT    **SHARE this note so that the co-signing nurse can place an eSignature**    Nurse 2 eSignature: Electronically signed by CHONG NIELSON RN on 7/27/24 at 8:31 AM EDT

## 2024-07-27 NOTE — ED PROVIDER NOTES
K/uL    RBC 2.80 (L) 4.35 - 5.65 M/uL    Hemoglobin 7.1 (L) 13.0 - 16.0 g/dL    Hematocrit 23.9 (L) 36.0 - 48.0 %    MCV 85.4 78.0 - 100.0 FL    MCH 25.4 24.0 - 34.0 PG    MCHC 29.7 (L) 31.0 - 37.0 g/dL    RDW 17.2 (H) 11.6 - 14.5 %    Platelets 264 135 - 420 K/uL    MPV 10.6 9.2 - 11.8 FL    Nucleated RBCs 0.0 0  WBC    nRBC 0.00 0.00 - 0.01 K/uL    Neutrophils % 82 (H) 40 - 73 %    Lymphocytes % 10 (L) 21 - 52 %    Monocytes % 6 3 - 10 %    Eosinophils % 1 0 - 5 %    Basophils % 0 0 - 2 %    Immature Granulocytes % 1 (H) 0.0 - 0.5 %    Neutrophils Absolute 6.1 1.8 - 8.0 K/UL    Lymphocytes Absolute 0.7 (L) 0.9 - 3.6 K/UL    Monocytes Absolute 0.5 0.05 - 1.2 K/UL    Eosinophils Absolute 0.1 0.0 - 0.4 K/UL    Basophils Absolute 0.0 0.0 - 0.1 K/UL    Immature Granulocytes Absolute 0.1 (H) 0.00 - 0.04 K/UL    Differential Type AUTOMATED     Comprehensive Metabolic Panel    Collection Time: 07/26/24  8:55 PM   Result Value Ref Range    Sodium 145 136 - 145 mmol/L    Potassium 3.4 (L) 3.5 - 5.5 mmol/L    Chloride 112 (H) 100 - 111 mmol/L    CO2 23 21 - 32 mmol/L    Anion Gap 10 3.0 - 18 mmol/L    Glucose 143 (H) 74 - 99 mg/dL    BUN 19 (H) 7.0 - 18 MG/DL    Creatinine 1.56 (H) 0.6 - 1.3 MG/DL    BUN/Creatinine Ratio 12 12 - 20      Est, Glom Filt Rate 47 (L) >60 ml/min/1.73m2    Calcium 8.9 8.5 - 10.1 MG/DL    Total Bilirubin 0.5 0.2 - 1.0 MG/DL    ALT 14 (L) 16 - 61 U/L    AST 13 10 - 38 U/L    Alk Phosphatase 93 45 - 117 U/L    Total Protein 7.1 6.4 - 8.2 g/dL    Albumin 2.6 (L) 3.4 - 5.0 g/dL    Globulin 4.5 (H) 2.0 - 4.0 g/dL    Albumin/Globulin Ratio 0.6 (L) 0.8 - 1.7     COVID-19, Rapid    Collection Time: 07/26/24  8:55 PM    Specimen: Nasopharyngeal   Result Value Ref Range    Source Nasopharyngeal      SARS-CoV-2, Rapid Not detected NOTD     Rapid influenza A/B antigens    Collection Time: 07/26/24  8:55 PM    Specimen: Nasal Washing   Result Value Ref Range    Influenza A Ag Negative NEG      Influenza  B Ag Negative NEG         PROCEDURES:    EKG interpreted by me.  EKG:  NSR with 1 degree AV block, rate 75.     Unless otherwise noted below, none    Total CriticalCare time was 45 minutes, excluding separately reportable procedures.    There was a high probability of clinically significant/life threatening deterioration in the patient's condition which required my urgent intervention.          EMERGENCY DEPARTMENT COURSE and DIFFERENTIALDIAGNOSIS/ MDM:   Vitals:    Vitals:    07/26/24 2041   BP: (!) 132/54   Pulse: 78   Resp: 24   Temp: 98.6 °F (37 °C)   TempSrc: Oral   SpO2: 100%   Weight: 127.4 kg (280 lb 12.8 oz)   Height: 1.778 m (5' 10\")       MDM    9:35 PM  Upon re-evaluation the patient's symptoms have improved.  Patient was informed of tests & results and that he is being admitted to the hospital.    Procedures    FINAL IMPRESSION      Acute exacerbation of COPD    Anemia    Lower GI bleed    DISPOSITION/PLAN     DISPOSITION  admit       (Please note that portions of this note were completed with a voice recognitionprogram.  Efforts were made to edit the dictations but occasionally words are mis-transcribed.)    Bennett Barrientos MD(electronically signed)  Attending Emergency Physician          Bennett Barrientos MD  07/30/24 1945              Bennett Barrientos MD  08/08/24 2012

## 2024-07-27 NOTE — ED NOTES
TRANSFER - OUT REPORT:    Verbal report given to Alta Rogel RN on Max Aly  being transferred to [id number] for routine progression of patient care       Report consisted of patient's Situation, Background, Assessment and   Recommendations(SBAR).     Information from the following report(s) ED Encounter Summary, ED SBAR, MAR, and Recent Results was reviewed with the receiving nurse.    [place] Fall Assessment:    Presents to emergency department  because of falls (Syncope, seizure, or loss of consciousness): No  Age > 70: Yes  Altered Mental Status, Intoxication with alcohol or substance confusion (Disorientation, impaired judgment, poor safety awaremess, or inability to follow instructions): No  Impaired Mobility: Ambulates or transfers with assistive devices or assistance; Unable to ambulate or transer.: Yes  Nursing Judgement: Yes          Lines:   Peripheral IV 07/26/24 Left Antecubital (Active)   Site Assessment Clean, dry & intact 07/26/24 2055   Line Status Blood return noted;Flushed 07/26/24 2055   Line Care Cap changed 07/26/24 2055   Phlebitis Assessment No symptoms 07/26/24 2055   Infiltration Assessment 0 07/26/24 2055   Alcohol Cap Used Yes 07/26/24 2055   Dressing Status New dressing applied;Clean, dry & intact 07/26/24 2055   Dressing Type Transparent 07/26/24 2055   Dressing Intervention New 07/26/24 2055        Opportunity for questions and clarification was provided.      Patient transported with:  Monitor

## 2024-07-27 NOTE — PROGRESS NOTES
The patient is new to me today.  He was admitted earlier this morning by my colleague.  He was seen and examined at the bedside in follow-up for CHF exacerbation, and gastrointestinal bleeding.  He was accompanied by his sister and by his niece.  The patient said that he feels weak in general.  He also feels mildly short of breath and thinks that he needs to be on oxygen.  He also reported bilateral lower extremity pitting edema.  The patient said that he has also had dark red blood in the stool for the last few weeks.  He is on Eliquis for a recent history of venous thromboembolism.    The patient mentioned that he had a similar episode of gastrointestinal bleeding back in May 2024.  At that time, he was evaluated by gastroenterology and underwent a colonoscopy.  Patient was found to have pseudomembranous colitis but testing for C. difficile was negative.  Therefore, patient was thought to have IBD.      -A consult has been placed to gastroenterology.  Await recommendations.  Keep the patient n.p.o. after midnight.  -1 unit of PRBC has been ordered.  Continue to monitor hemoglobin and hematocrit.  It may be reasonable to transfuse the patient to a goal hemoglobin of 7.5 because he has cardiovascular disease.  -2D echo from May 2024 revealed a normal EF.  Patient may have acute on chronic diastolic CHF.  A repeat 2D echo has been ordered.  Continue furosemide for diuresis.  Monitor intake and output.  Measure weight on a daily basis.      -Eliquis has been placed on hold because of gastrointestinal bleeding.  Avoid antiplatelet agents and anticoagulants at this time.  -Continue tamsulosin and finasteride for BPH  -Continue amlodipine and diltiazem for hypertension. Hydrochlorothiazide has been placed on hold since the patient's kidney function is at risk of further deterioration from diuretics.  Of note, the patient has stage III chronic kidney disease.      Please refer to the H&P by my colleague for additional

## 2024-07-27 NOTE — ED NOTES
Pt resting comfortably on stretcher. Pt c/o cough x 1 week with worsening over the last few days and bilateral ankle swelling. Pt reports he saw his cardiologist on Wednesday but did not inform him about his cough but he did inform his about his ankles. Pt reports cardiologist wasn't concerned about ankle swelling and told him to elevate his legs three times a day. Pt states cough is mostly dry with very little phlegm production. Denies any other URI symptoms. Pt is speaking in full sentences and NAD. 99% on room air.     Past Medical History:   Diagnosis Date    BPH (benign prostatic hyperplasia) 05/04/2023    CKD (chronic kidney disease) 05/04/2023    Colitis     Diabetes mellitus, type 2 (HCC) 05/04/2023    Hepatitis C 05/04/2023    History of Melody's gangrene 05/04/2023    Hyperlipidemia 05/04/2023    Hypertension 05/04/2023    ASHLEY on CPAP 05/04/2023    Paroxysmal atrial fibrillation (HCC) 05/04/2023

## 2024-07-27 NOTE — CONSENT
Informed Consent for Blood Component Transfusion Note    I have discussed with the patient the rationale for blood component transfusion; its benefits in treating or preventing fatigue, organ damage, or death; and its risk which includes mild transfusion reactions, rare risk of blood borne infection, or more serious but rare reactions. I have discussed the alternatives to transfusion, including the risk and consequences of not receiving transfusion. The patient had an opportunity to ask questions and had agreed to proceed with transfusion of blood components.    Electronically signed by Urban Joseph MD on 7/27/24 at 2:02 PM EDT

## 2024-07-27 NOTE — PLAN OF CARE
Problem: Safety - Adult  Goal: Free from fall injury  Outcome: Progressing  Flowsheets (Taken 7/27/2024 1034)  Free From Fall Injury: Instruct family/caregiver on patient safety     Problem: Pain  Goal: Verbalizes/displays adequate comfort level or baseline comfort level  Outcome: Progressing     Problem: Discharge Planning  Goal: Discharge to home or other facility with appropriate resources  Outcome: Progressing     Problem: Respiratory - Adult  Goal: Achieves optimal ventilation and oxygenation  Outcome: Progressing     Problem: Cardiovascular - Adult  Goal: Maintains optimal cardiac output and hemodynamic stability  Outcome: Progressing  Goal: Absence of cardiac dysrhythmias or at baseline  Outcome: Progressing     Problem: Skin/Tissue Integrity - Adult  Goal: Skin integrity remains intact  Outcome: Progressing  Goal: Incisions, wounds, or drain sites healing without S/S of infection  Outcome: Progressing  Goal: Oral mucous membranes remain intact  Outcome: Progressing     Problem: Hematologic - Adult  Goal: Maintains hematologic stability  Outcome: Progressing

## 2024-07-27 NOTE — PROGRESS NOTES
Called by ER physician who stated that he wanted to transfer the patient to our facility for lower GI bleed.  The patient presents with a few days of dyspnea.  He was asked by his cardiologist to come to the hospital for further evaluation.  He was found to have a hemoglobin of 7.1.  He was typed and screened with 1 unit of blood to be transfused.  He is on Eliquis  Hemoccult stool was positive.  Transfer to [facility] was accepted for GI evaluation.    Vaibhav Winkler MD  Attending Physician  7/27/2024 1:55 AM

## 2024-07-27 NOTE — PROGRESS NOTES
0504 Received pt via stretcher by ems. Patient is aaox4. No complaints of any shortness of breath. #20g to left ac flushes well. 4 eye skin assessment completed by two nurses. Call bell within reach, bed in lowest position and bed alarm on, pt verbalizes understanding to use call bell to call for assistance.

## 2024-07-28 PROBLEM — I50.33 ACUTE ON CHRONIC HEART FAILURE WITH PRESERVED EJECTION FRACTION (HCC): Status: ACTIVE | Noted: 2024-07-28

## 2024-07-28 PROBLEM — Z86.79 H/O SICK SINUS SYNDROME: Status: ACTIVE | Noted: 2024-07-28

## 2024-07-28 PROBLEM — K92.2 GI BLEED: Status: RESOLVED | Noted: 2024-07-27 | Resolved: 2024-07-28

## 2024-07-28 PROBLEM — Z86.718 HISTORY OF VENOUS THROMBOEMBOLISM: Status: ACTIVE | Noted: 2024-07-28

## 2024-07-28 PROBLEM — E87.6 HYPOKALEMIA: Status: ACTIVE | Noted: 2024-07-28

## 2024-07-28 PROBLEM — E11.22 TYPE 2 DIABETES MELLITUS WITH STAGE 3A CHRONIC KIDNEY DISEASE, WITHOUT LONG-TERM CURRENT USE OF INSULIN (HCC): Status: ACTIVE | Noted: 2023-05-04

## 2024-07-28 PROBLEM — E83.42 HYPOMAGNESEMIA: Status: ACTIVE | Noted: 2024-07-28

## 2024-07-28 PROBLEM — N18.31 STAGE 3A CHRONIC KIDNEY DISEASE (HCC): Status: ACTIVE | Noted: 2024-07-28

## 2024-07-28 PROBLEM — N18.31 TYPE 2 DIABETES MELLITUS WITH STAGE 3A CHRONIC KIDNEY DISEASE, WITHOUT LONG-TERM CURRENT USE OF INSULIN (HCC): Status: ACTIVE | Noted: 2023-05-04

## 2024-07-28 LAB
APTT PPP: 36 SEC (ref 23–36.4)
GLUCOSE BLD STRIP.AUTO-MCNC: 132 MG/DL (ref 70–110)
GLUCOSE BLD STRIP.AUTO-MCNC: 135 MG/DL (ref 70–110)
GLUCOSE BLD STRIP.AUTO-MCNC: 146 MG/DL (ref 70–110)
GLUCOSE BLD STRIP.AUTO-MCNC: 163 MG/DL (ref 70–110)
HCT VFR BLD AUTO: 23.8 % (ref 36–48)
HCT VFR BLD AUTO: 29.8 % (ref 36–48)
HGB BLD-MCNC: 7 G/DL (ref 13–16)
HGB BLD-MCNC: 9 G/DL (ref 13–16)
HISTORY CHECK: NORMAL
INR PPP: 1.3 (ref 0.9–1.1)
IRON SATN MFR SERPL: 9 % (ref 20–50)
IRON SERPL-MCNC: 22 UG/DL (ref 50–175)
MAGNESIUM SERPL-MCNC: 1.5 MG/DL (ref 1.6–2.6)
PROTHROMBIN TIME: 16.4 SEC (ref 11.9–14.9)
TIBC SERPL-MCNC: 237 UG/DL (ref 250–450)

## 2024-07-28 PROCEDURE — 85014 HEMATOCRIT: CPT

## 2024-07-28 PROCEDURE — 6370000000 HC RX 637 (ALT 250 FOR IP): Performed by: INTERNAL MEDICINE

## 2024-07-28 PROCEDURE — 6360000002 HC RX W HCPCS: Performed by: INTERNAL MEDICINE

## 2024-07-28 PROCEDURE — 36430 TRANSFUSION BLD/BLD COMPNT: CPT

## 2024-07-28 PROCEDURE — 85610 PROTHROMBIN TIME: CPT

## 2024-07-28 PROCEDURE — 2580000003 HC RX 258: Performed by: INTERNAL MEDICINE

## 2024-07-28 PROCEDURE — 99233 SBSQ HOSP IP/OBS HIGH 50: CPT | Performed by: STUDENT IN AN ORGANIZED HEALTH CARE EDUCATION/TRAINING PROGRAM

## 2024-07-28 PROCEDURE — 99223 1ST HOSP IP/OBS HIGH 75: CPT | Performed by: INTERNAL MEDICINE

## 2024-07-28 PROCEDURE — 6370000000 HC RX 637 (ALT 250 FOR IP): Performed by: STUDENT IN AN ORGANIZED HEALTH CARE EDUCATION/TRAINING PROGRAM

## 2024-07-28 PROCEDURE — 83540 ASSAY OF IRON: CPT

## 2024-07-28 PROCEDURE — 83735 ASSAY OF MAGNESIUM: CPT

## 2024-07-28 PROCEDURE — 83550 IRON BINDING TEST: CPT

## 2024-07-28 PROCEDURE — P9016 RBC LEUKOCYTES REDUCED: HCPCS

## 2024-07-28 PROCEDURE — 1100000000 HC RM PRIVATE

## 2024-07-28 PROCEDURE — 94761 N-INVAS EAR/PLS OXIMETRY MLT: CPT

## 2024-07-28 PROCEDURE — 6360000002 HC RX W HCPCS: Performed by: PHYSICIAN ASSISTANT

## 2024-07-28 PROCEDURE — 85730 THROMBOPLASTIN TIME PARTIAL: CPT

## 2024-07-28 PROCEDURE — 36415 COLL VENOUS BLD VENIPUNCTURE: CPT

## 2024-07-28 PROCEDURE — 85018 HEMOGLOBIN: CPT

## 2024-07-28 PROCEDURE — 82962 GLUCOSE BLOOD TEST: CPT

## 2024-07-28 RX ORDER — SODIUM CHLORIDE 9 MG/ML
INJECTION, SOLUTION INTRAVENOUS PRN
Status: COMPLETED | OUTPATIENT
Start: 2024-07-28 | End: 2024-07-29

## 2024-07-28 RX ORDER — FUROSEMIDE 10 MG/ML
40 INJECTION INTRAMUSCULAR; INTRAVENOUS 2 TIMES DAILY
Status: DISCONTINUED | OUTPATIENT
Start: 2024-07-28 | End: 2024-08-01

## 2024-07-28 RX ORDER — GUAIFENESIN/DEXTROMETHORPHAN 100-10MG/5
5 SYRUP ORAL EVERY 4 HOURS PRN
Status: DISCONTINUED | OUTPATIENT
Start: 2024-07-28 | End: 2024-08-09 | Stop reason: HOSPADM

## 2024-07-28 RX ADMIN — CETIRIZINE HYDROCHLORIDE 10 MG: 10 TABLET, FILM COATED ORAL at 09:18

## 2024-07-28 RX ADMIN — INSULIN GLARGINE 20 UNITS: 100 INJECTION, SOLUTION SUBCUTANEOUS at 21:15

## 2024-07-28 RX ADMIN — FINASTERIDE 5 MG: 5 TABLET, FILM COATED ORAL at 09:19

## 2024-07-28 RX ADMIN — FLUTICASONE PROPIONATE 1 SPRAY: 50 SPRAY, METERED NASAL at 09:20

## 2024-07-28 RX ADMIN — FUROSEMIDE 40 MG: 10 INJECTION, SOLUTION INTRAMUSCULAR; INTRAVENOUS at 11:23

## 2024-07-28 RX ADMIN — DILTIAZEM HYDROCHLORIDE 120 MG: 120 CAPSULE, EXTENDED RELEASE ORAL at 09:18

## 2024-07-28 RX ADMIN — GUAIFENESIN AND DEXTROMETHORPHAN 5 ML: 100; 10 SYRUP ORAL at 15:27

## 2024-07-28 RX ADMIN — SODIUM CHLORIDE, PRESERVATIVE FREE 40 MG: 5 INJECTION INTRAVENOUS at 18:24

## 2024-07-28 RX ADMIN — SODIUM CHLORIDE, PRESERVATIVE FREE 40 MG: 5 INJECTION INTRAVENOUS at 04:45

## 2024-07-28 RX ADMIN — FUROSEMIDE 40 MG: 10 INJECTION, SOLUTION INTRAMUSCULAR; INTRAVENOUS at 18:01

## 2024-07-28 RX ADMIN — SODIUM CHLORIDE, PRESERVATIVE FREE 10 ML: 5 INJECTION INTRAVENOUS at 09:20

## 2024-07-28 RX ADMIN — SODIUM CHLORIDE, PRESERVATIVE FREE 10 ML: 5 INJECTION INTRAVENOUS at 21:16

## 2024-07-28 RX ADMIN — ATORVASTATIN CALCIUM 40 MG: 40 TABLET, FILM COATED ORAL at 09:18

## 2024-07-28 RX ADMIN — TAMSULOSIN HYDROCHLORIDE 0.4 MG: 0.4 CAPSULE ORAL at 09:19

## 2024-07-28 ASSESSMENT — PAIN SCALES - GENERAL
PAINLEVEL_OUTOF10: 0

## 2024-07-28 NOTE — PLAN OF CARE
Problem: Safety - Adult  Goal: Free from fall injury  Outcome: Progressing  Flowsheets (Taken 7/28/2024 0729)  Free From Fall Injury: Instruct family/caregiver on patient safety     Problem: Pain  Goal: Verbalizes/displays adequate comfort level or baseline comfort level  Outcome: Progressing  Flowsheets (Taken 7/28/2024 0715)  Verbalizes/displays adequate comfort level or baseline comfort level:   Encourage patient to monitor pain and request assistance   Assess pain using appropriate pain scale   Administer analgesics based on type and severity of pain and evaluate response   Implement non-pharmacological measures as appropriate and evaluate response   Notify Licensed Independent Practitioner if interventions unsuccessful or patient reports new pain     Problem: Discharge Planning  Goal: Discharge to home or other facility with appropriate resources  Outcome: Progressing  Flowsheets (Taken 7/28/2024 0719)  Discharge to home or other facility with appropriate resources:   Identify barriers to discharge with patient and caregiver   Arrange for needed discharge resources and transportation as appropriate   Identify discharge learning needs (meds, wound care, etc)   Refer to discharge planning if patient needs post-hospital services based on physician order or complex needs related to functional status, cognitive ability or social support system     Problem: Respiratory - Adult  Goal: Achieves optimal ventilation and oxygenation  Outcome: Progressing  Flowsheets (Taken 7/28/2024 0719)  Achieves optimal ventilation and oxygenation:   Assess for changes in respiratory status   Assess for changes in mentation and behavior   Position to facilitate oxygenation and minimize respiratory effort   Oxygen supplementation based on oxygen saturation or arterial blood gases   Encourage broncho-pulmonary hygiene including cough, deep breathe, incentive spirometry   Assess the need for suctioning and aspirate as needed    Respiratory therapy support as indicated     Problem: Cardiovascular - Adult  Goal: Maintains optimal cardiac output and hemodynamic stability  Outcome: Progressing  Flowsheets (Taken 7/28/2024 0719)  Maintains optimal cardiac output and hemodynamic stability:   Monitor blood pressure and heart rate   Monitor urine output and notify Licensed Independent Practitioner for values outside of normal range   Administer fluid and/or volume expanders as ordered  Goal: Absence of cardiac dysrhythmias or at baseline  Outcome: Progressing  Flowsheets (Taken 7/28/2024 0719)  Absence of cardiac dysrhythmias or at baseline:   Monitor cardiac rate and rhythm   Assess for signs of decreased cardiac output   Administer antiarrhythmia medication and electrolyte replacement as ordered     Problem: Skin/Tissue Integrity - Adult  Goal: Skin integrity remains intact  Outcome: Progressing  Flowsheets  Taken 7/28/2024 0729  Skin Integrity Remains Intact:   Monitor for areas of redness and/or skin breakdown   Assess vascular access sites hourly  Taken 7/28/2024 0719  Skin Integrity Remains Intact:   Monitor for areas of redness and/or skin breakdown   Assess vascular access sites hourly  Goal: Incisions, wounds, or drain sites healing without S/S of infection  Outcome: Progressing  Flowsheets  Taken 7/28/2024 0729  Incisions, Wounds, or Drain Sites Healing Without Sign and Symptoms of Infection: ADMISSION and DAILY: Assess and document risk factors for pressure ulcer development  Taken 7/28/2024 0719  Incisions, Wounds, or Drain Sites Healing Without Sign and Symptoms of Infection:   ADMISSION and DAILY: Assess and document risk factors for pressure ulcer development   TWICE DAILY: Assess and document skin integrity  Goal: Oral mucous membranes remain intact  Outcome: Progressing  Flowsheets  Taken 7/28/2024 0729  Oral Mucous Membranes Remain Intact:   Assess oral mucosa and hygiene practices   Implement preventative oral hygiene  regimen  Taken 7/28/2024 0719  Oral Mucous Membranes Remain Intact:   Assess oral mucosa and hygiene practices   Implement preventative oral hygiene regimen   Implement oral medicated treatments as ordered     Problem: Hematologic - Adult  Goal: Maintains hematologic stability  Outcome: Progressing  Flowsheets (Taken 7/28/2024 0719)  Maintains hematologic stability:   Assess for signs and symptoms of bleeding or hemorrhage   Monitor labs for bleeding or clotting disorders   Administer blood products/factors as ordered

## 2024-07-28 NOTE — PROGRESS NOTES
[facility]ist [facility]  Progress Note    Patient: Max Aly Age: 74 y.o. : 1950 MR#: [id number] SSN: [id number]  Date: 2024                 DVT Prophylaxis:  []Lovenox  []Hep SQ  [x]SCDs  []Coumadin   []On Heparin gtt []PO anticoagulant    Anticipated discharge: 2024 to home, pending clinical course    Subjective:     The patient was seen and examined at the bedside in follow-up for gastrointestinal bleeding, CHF exacerbation and acute on chronic anemia among other issues.  Patient was resting in bed.  He complained of a cough with intermittent, clear expectoration.  He denied any chest pain or shortness of breath.  He had a bowel movement today but he could not tell me if there was any blood in it.      Objective:   VS: /72   Pulse 68   Temp 98.7 °F (37.1 °C) (Oral)   Resp 19   Ht 1.778 m (5' 10\")   Wt 131.2 kg (289 lb 4.8 oz)   SpO2 96%   BMI 41.51 kg/m²    Tmax/24hrs: Temp (24hrs), Av.5 °F (36.9 °C), Min:97.6 °F (36.4 °C), Max:99.4 °F (37.4 °C)    Intake/Output Summary (Last 24 hours) at 2024 1555  Last data filed at 2024 1339  Gross per 24 hour   Intake 412.5 ml   Output 2420 ml   Net -2007.5 ml        PHYSICAL EXAM  General Appearance: NAD, conversant; obese appearing  HENT: normocephalic/atraumatic, moist mucus membranes  Neck: No JVD, supple  Lungs: Faint inspiratory rhonchi auscultated anteriorly; breath sounds are diminished  CV: RRR, no m/r/g  Abdomen: soft, non-tender, normal bowel sounds  Extremities: no cyanosis, 2+ bilateral lower extremity pitting edema present  Neuro: No focal deficits, motor/sensory intact  Skin: Normal color, intact  Psych: appropriate affect, alert and oriented to person, place and time    Current Facility-Administered Medications   Medication Dose Route Frequency    furosemide (LASIX) injection 40 mg  40 mg IntraVENous BID    guaiFENesin-dextromethorphan (ROBITUSSIN DM) 100-10 MG/5ML syrup 5  Benign prostatic hyperplasia  Continue tamsulosin and finasteride    11.  History of sick sinus syndrome  S/p permanent pacemaker placement    12. Hypertension  Continue diltiazem.  Hold hydrochlorothiazide as the patient is at risk of KIRIT.    .Please contact Dr. Joseph if there are any questions. Greater than 50 minutes were spent reviewing the patient's chart, obtaining a thorough history, performing a physical exam, placing orders and dictating this document.  Findings and plan were also discussed with the patient/patient's family and with RN.  Greater than 50% of the time was spent on direct patient care.             Signed By: [unfilled]     July 28, 2024 3:55 PM

## 2024-07-28 NOTE — CONSULTS
[facility]   [email or web address]      Impression:   Acute GI bleed-no active bleed at present.  Patient has been on Eliquis for atrial fibrillation and pulmonary embolus  KIRIT, likely cardiorenal syndrome  Chronic anemia  Hemoccult positive stool  History of BPH  History of Melody's gangrene  Hyperlipidemia  Type 2 diabetes  Hepatitis C  Paroxysmal atrial fibrillation on Eliquis  History of sick sinus syndrome status post pacemaker 12/2020  ASHLEY, unknown compliance  Recent episode of colitis 5/2024  Morbid obesity      Plan:     1.  Hydrate  Monitor H&H  IV PPI  Transfuse as needed  Proceed with EGD in a.m., or sooner should bleed become active      Chief Complaint: Weakness      HPI:  Max Aly is a 74 y.o. male who is being seen on consult for the above.  He presented with symptoms of shortness of breath and profound weakness and was noted to have a marked drop in his H&H.  He denies any previous history of GI bleeding or peptic ulcer disease he denies any abdominal pain but has been losing weight.  He reports having colonoscopy within the last year..On admission his H&H was decreased to 6.9 and 24.5 he has received blood without a significant change in his H&H currently at 7.0 and 23.8.  He has not been using any salicylates NSAIDs or alcohol.    PMH:   Past Medical History:   Diagnosis Date    BPH (benign prostatic hyperplasia) 05/04/2023    CKD (chronic kidney disease) 05/04/2023    Colitis     Diabetes mellitus, type 2 (HCC) 05/04/2023    Hepatitis C 05/04/2023    History of Melody's gangrene 05/04/2023    Hyperlipidemia 05/04/2023    Hypertension 05/04/2023    ASHLEY on CPAP 05/04/2023    Paroxysmal atrial fibrillation (HCC) 05/04/2023       PSH:   Past Surgical History:   Procedure Laterality Date    IR IVC FILTER PLACEMENT W IMAGING  5/5/2023    IR IVC FILTER PLACEMENT W IMAGING 5/5/2023 Bob Castañeda MD [facility] RAD ANGIO IR    SCROTAL  SURGERY Left        Social HX:   Social History     Socioeconomic History    Marital status:      Spouse name: Not on file    Number of children: Not on file    Years of education: Not on file    Highest education level: Not on file   Occupational History    Not on file   Tobacco Use    Smoking status: Former     Types: Cigarettes    Smokeless tobacco: Never   Substance and Sexual Activity    Alcohol use: Not on file    Drug use: Never    Sexual activity: Not on file   Other Topics Concern    Not on file   Social History Narrative    Not on file     Social Determinants of Health     Financial Resource Strain: Not on file   Food Insecurity: No Food Insecurity (7/27/2024)    Hunger Vital Sign     Worried About Running Out of Food in the Last Year: Never true     Ran Out of Food in the Last Year: Never true   Transportation Needs: No Transportation Needs (7/27/2024)    PRAPARE - Transportation     Lack of Transportation (Medical): No     Lack of Transportation (Non-Medical): No   Physical Activity: Not on file   Stress: Not on file   Social Connections: Not on file   Intimate Partner Violence: Not on file   Housing Stability: Low Risk  (7/27/2024)    Housing Stability Vital Sign     Unable to Pay for Housing in the Last Year: No     Number of Places Lived in the Last Year: 1     Unstable Housing in the Last Year: No       FHX:   Family History   Problem Relation Age of Onset    Hypertension Mother        Allergy:   Allergies   Allergen Reactions    Ace Inhibitors Cough     Other reaction(s): Other (See Comments)  Cough  Other reaction(s): Cough  Other reaction(s): Other (See Comments)  Cough      Lovastatin      Other reaction(s): other/intolerance  Elevated CPK  Other reaction(s): Elevated CPK      Morphine Other (See Comments)     Other reaction(s): other/intolerance    Per pt CANNOT HAVE    Other reaction(s): other/intolerance   Per pt CANNOT HAVE       Home Medications:     @[facility]@    Review of

## 2024-07-28 NOTE — CONSULTS
I saw, examined, evaluated, and reviewed with the resident/PA/NP medical history and the findings on the physical examination.  I personally reviewed the patient's labs, tests, vitals, orders, medications, updated history, and other providers assessments as well. I discussed with the resident/PA/NP the patient's diagnosis and concur with the plan with the below modifications.        EXAMINATION:  General:  Alert, cooperative, no distress  Head:  Normocephalic, without obvious abnormality, atraumatic.  Eyes:  Conjunctivae/corneas clear  Lungs:  Clear to auscultation bilaterally, no wheezes  Heart:  Regular rate and rhythm, S1, S2 normal, no murmur  Abdomen:  No guarding.  Soft, non-tender.  Extremities:  Extremities normal, no edema  Skin:  No rashes or lesions visible extremities  Neurologic:  Grossly normal strength & sensation     ASSESSMENT  Anemia, possible GI bleed, colitis  Chronic LE edema  Acute on chronic cough  Acute on chronic diastolic heart failure  CKD  DM  Pacemaker 2017  H/o A.fib  Kidney mass  H/o PE with filter, on eliquis    PLAN  Plan IV diuresis, close monitoring K/Cr, followup echo.     Thank you for this consultation and for allowing us to participate in this patient's care.  Chente Pendleton M.D.

## 2024-07-28 NOTE — ACP (ADVANCE CARE PLANNING)
Advance Care Planning   Healthcare Decision Maker:    Primary Decision Maker: Rosalind Rdoas sister - Brother/Sister - [phone]    Secondary Decision Maker: Max Aly son - Child - [phone]         conducted an initial consultation and Spiritual Assessment for Max Aly, who is a 74 y.o.,male. Patient’s Primary Language is: English.   According to the patient’s EMR Orthodox Affiliation is: Evangelical.     The reason the Patient came to the hospital is:   Patient Active Problem List    Diagnosis Date Noted    Stage 3a chronic kidney disease (HCC) 07/28/2024    Hypokalemia 07/28/2024    Hypomagnesemia 07/28/2024    History of venous thromboembolism 07/28/2024    Acute on chronic heart failure with preserved ejection fraction (HCC) 07/28/2024    H/O sick sinus syndrome 07/28/2024    Gastrointestinal bleeding 07/27/2024    Encounter for palliative care 05/05/2023    Advanced care planning/counseling discussion 05/05/2023    Anemia of chronic disease 05/05/2023    Debility 05/05/2023    Melody's gangrene of scrotum 05/05/2023    Chronic atrial fibrillation (HCC) 05/05/2023    Diverticulitis of colon 05/05/2023    Bilateral pulmonary embolism (HCC) 05/04/2023    Hypertension 05/04/2023    Hyperlipidemia 05/04/2023    Type 2 diabetes mellitus with stage 3a chronic kidney disease, without long-term current use of insulin (HCC) 05/04/2023    ASHLEY on CPAP 05/04/2023    Hepatitis C 05/04/2023    CKD (chronic kidney disease) 05/04/2023    Acute on chronic blood loss anemia 05/04/2023    Acute kidney injury superimposed on chronic kidney disease (HCC) 05/04/2023    History of Melody's gangrene 05/04/2023    Benign prostatic hyperplasia without lower urinary tract symptoms 05/04/2023    Paroxysmal atrial fibrillation (HCC) 05/04/2023    Diverticulitis 05/04/2023        The  provided the following Interventions:  Initiated a relationship of care and support with patient in bed 456 where he

## 2024-07-28 NOTE — CONSULTS
[facility] - Consult Note    Cardiology consultation request from Dr. Joseph for evaluation and management/treatment of HFpEF    Date of  Admission: 7/26/2024  8:39 PM   Primary Care Physician:  Louis, Claude, MD    Attending Cardiologist: Dr. Pendleton       Assessment:     -Anemia, GI bleed.  Hx colitis.  S/p colonoscopy 5/10/2024 - inadequate colon preparation with stool in the entire examined colon, however showed diffuse severe mucosal changes in rectum, sigmoid colon, descending colon, transverse colon and proximal ascending colon secondary to colitis, some of the areas look like ?pseudomembranous colitis, biopsied; diverticulosis in sigmoid colon; one 4 mm polyp in the cecum that was biopsied  -Chronic peripheral edema, uses support stockings, not on maintenance diuretics  -Echo 5/14/2024 ([facility]): LVEF 50-55%, no significant valvular stenosis or regurgitation within limits of study  -Sick sinus syndrome, S/p PPM 05/2017, s/p gen change 12/2020 ([facility]) with chronic intermittent sensing of atrial lead noise  -Hx pulmonary embolism, 08/2020, 05/2024, s/p IVC filter, on Eliquis  -Hx acute occlusive superficial vein thrombosis in LUE cephalic vein upper arm  -R upper renal mass, followed by [facility]  -Hx Melody's gangrene, s/p extensive scrotal debridement  -Hx BPH, hematuria, retentions, s/p cystoscopy, clot evacuation, limited TURP  -HTN, on Losartan, Diltiazem; Hx cough with ACEi  -DMII  -HLD  -ASHLEY, uses CPAP  -Obesity    Primary cardiologist is Dr. Hunt at [facility]      Plan:     -Echocardiogram pending, will review results as able.  -Will start trial of IV Lasix; prior order for IV Lasix was rejected by pharmacy.  Close monitoring of renal indices and strict I/O's while on IV diuretics.  BMP/Mg ordered for today along with daily chemistry.  -Recommend to maintain K > 4, Mg > 2 from cardiac standpoint.  Replacement per primary team.  -D/c Amlodipine - does not appear  to be an outpatient medicine, on Cardizem.    -Recommend to maintain Hgb > 8 from cardiac standpoint; defer evaluation/management of anemia to primary team, pending GI consult.     History of Present Illness:     This is a 74 y.o. male admitted for GI bleed [K92.2]  Iron deficiency anemia due to chronic blood loss [D50.0]  Lower GI bleed [K92.2]  Acute exacerbation of chronic obstructive pulmonary disease (COPD) (HCC) [J44.1].    Patient complains of: shortness of breath, dark red blood in stool      Max Aly is a 74 y.o. male who presented to the hospital due to dry cough and dark red blood in stool.  He is overall poor historian but reports the cough started sometime after his appointment with his cardiologist, Dr. Hunt, on 7/24.  He states the cough is frequent and cannot identify any triggers.  He reports dark red blood in stool, but unable to say when it started most recently; he has history of colitis in May.  He reports chronic BLE edema, which he states is unchanged from baseline.  He denies PND/orthopnea. No chest pain.     Cardiac risk factors: advanced age (older than 55 for men, 65 for women), diabetes mellitus, dyslipidemia, hypertension, male gender, and obesity (BMI >= 30 kg/m2)      Review of Symptoms:  Except as stated above include:  Constitutional:  negative  Respiratory:  As per HPI  Cardiovascular:  As per HPI  Gastrointestinal: As per HPI  Genitourinary:  negative  Musculoskeletal:  Negative  Neurological:  Negative  Dermatological:  Negative  Endocrinological: Negative  Psychological:  Negative       Past Medical History:     Past Medical History:   Diagnosis Date    BPH (benign prostatic hyperplasia) 05/04/2023    CKD (chronic kidney disease) 05/04/2023    Colitis     Diabetes mellitus, type 2 (HCC) 05/04/2023    Hepatitis C 05/04/2023    History of Melody's gangrene 05/04/2023    Hyperlipidemia 05/04/2023    Hypertension 05/04/2023    ASHLEY on CPAP 05/04/2023    Paroxysmal atrial  kg (300 lb)   11/02/23 132.5 kg (292 lb)       General:  alert, appears stated age, cooperative, and no distress  Neck:  supple  Lungs:  wheezy cough but otherwise clear  Heart:  regular rate and rhythm  Abdomen:  abdomen is soft without significant tenderness, masses, organomegaly or guarding  Extremities:  atraumatic, + edema to BLE  Skin: warm and dry  Neuro: alert, affect appropriate, no involuntary movements  Psych: non focal     Data Review:     Recent Labs     07/26/24 2055 07/27/24  1025 07/27/24  1414 07/27/24  2134   WBC 7.5  --   --   --    HGB 7.1* 6.6* 6.9* 7.6*   HCT 23.9* 23.2* 24.5* 25.7*     --   --   --      Recent Labs     07/26/24 2055      K 3.4*   *   CO2 23   BUN 19*   CREATININE 1.56*   ALT 14*       All Cardiac Markers in the last 24 hours:    Lab Results   Component Value Date/Time    TROPHS 74 05/04/2023 07:13 PM    TROPHS 95 05/04/2023 06:25 AM     Lab Results   Component Value Date/Time    NTPROBNP 3,431 07/27/2024 10:25 AM       Last Lipid:  No results found for: \"CHOL\", \"CHLST\", \"CHOLV\", \"HDL\", \"HDLC\", \"LDL\"    Cardiographics:     Encounter Date: 07/26/24   EKG 12 Lead   Result Value    Ventricular Rate 75    Atrial Rate 75    P-R Interval 218    QRS Duration 108    Q-T Interval 400    QTc Calculation (Bazett) 446    P Axis 73    R Axis -19    T Axis 104    Diagnosis      Sinus rhythm with 1st degree AV block with premature atrial complexes  Minimal voltage criteria for LVH, may be normal variant ( R in aVL )  Septal infarct , age undetermined  Abnormal ECG  Confirmed by MD Keerthi, Chente ([id number]) on 7/27/2024 9:22:24 AM         Xray Result (most recent):  XR CHEST PORTABLE 07/26/2024    Narrative  XR CHEST PORTABLE      INDICATION: cough; Shortness of breath.    TECHNIQUE:   Portable CXR.    Comparison May 4, 2023    FINDINGS:  Cardiomegaly. Moderate-severe vascular congestion, new or worse than before. No  consolidation or pleural effusion. Pacemaker with atrial

## 2024-07-29 ENCOUNTER — ANESTHESIA EVENT (OUTPATIENT)
Facility: HOSPITAL | Age: 74
End: 2024-07-29
Payer: MEDICARE

## 2024-07-29 ENCOUNTER — APPOINTMENT (OUTPATIENT)
Facility: HOSPITAL | Age: 74
End: 2024-07-29
Attending: STUDENT IN AN ORGANIZED HEALTH CARE EDUCATION/TRAINING PROGRAM
Payer: MEDICARE

## 2024-07-29 ENCOUNTER — ANESTHESIA (OUTPATIENT)
Facility: HOSPITAL | Age: 74
End: 2024-07-29
Payer: MEDICARE

## 2024-07-29 LAB
ABO + RH BLD: NORMAL
ALBUMIN SERPL-MCNC: 2.4 G/DL (ref 3.4–5)
ALBUMIN/GLOB SERPL: 0.6 (ref 0.8–1.7)
ALP SERPL-CCNC: 76 U/L (ref 45–117)
ALT SERPL-CCNC: 10 U/L (ref 16–61)
ANION GAP SERPL CALC-SCNC: 6 MMOL/L (ref 3–18)
AST SERPL-CCNC: 10 U/L (ref 10–38)
BASOPHILS # BLD: 0 K/UL (ref 0–0.1)
BASOPHILS NFR BLD: 0 % (ref 0–2)
BILIRUB SERPL-MCNC: 1.1 MG/DL (ref 0.2–1)
BLD PROD TYP BPU: NORMAL
BLD PROD TYP BPU: NORMAL
BLOOD BANK BLOOD PRODUCT EXPIRATION DATE: NORMAL
BLOOD BANK BLOOD PRODUCT EXPIRATION DATE: NORMAL
BLOOD BANK DISPENSE STATUS: NORMAL
BLOOD BANK DISPENSE STATUS: NORMAL
BLOOD BANK ISBT PRODUCT BLOOD TYPE: 5100
BLOOD BANK ISBT PRODUCT BLOOD TYPE: 5100
BLOOD BANK PRODUCT CODE: NORMAL
BLOOD BANK PRODUCT CODE: NORMAL
BLOOD BANK UNIT TYPE AND RH: NORMAL
BLOOD BANK UNIT TYPE AND RH: NORMAL
BLOOD GROUP ANTIBODIES SERPL: NORMAL
BPU ID: NORMAL
BPU ID: NORMAL
BUN SERPL-MCNC: 15 MG/DL (ref 7–18)
BUN/CREAT SERPL: 11 (ref 12–20)
CALCIUM SERPL-MCNC: 8.5 MG/DL (ref 8.5–10.1)
CALLED TO: NORMAL
CALLED TO:: NORMAL
CHLORIDE SERPL-SCNC: 109 MMOL/L (ref 100–111)
CO2 SERPL-SCNC: 25 MMOL/L (ref 21–32)
CREAT SERPL-MCNC: 1.32 MG/DL (ref 0.6–1.3)
CROSSMATCH RESULT: NORMAL
CROSSMATCH RESULT: NORMAL
DIFFERENTIAL METHOD BLD: ABNORMAL
EOSINOPHIL # BLD: 0.1 K/UL (ref 0–0.4)
EOSINOPHIL NFR BLD: 1 % (ref 0–5)
ERYTHROCYTE [DISTWIDTH] IN BLOOD BY AUTOMATED COUNT: 17 % (ref 11.6–14.5)
GLOBULIN SER CALC-MCNC: 3.8 G/DL (ref 2–4)
GLUCOSE BLD STRIP.AUTO-MCNC: 121 MG/DL (ref 70–110)
GLUCOSE BLD STRIP.AUTO-MCNC: 121 MG/DL (ref 70–110)
GLUCOSE BLD STRIP.AUTO-MCNC: 127 MG/DL (ref 70–110)
GLUCOSE BLD STRIP.AUTO-MCNC: 131 MG/DL (ref 70–110)
GLUCOSE BLD STRIP.AUTO-MCNC: 140 MG/DL (ref 70–110)
GLUCOSE BLD STRIP.AUTO-MCNC: 142 MG/DL (ref 70–110)
GLUCOSE SERPL-MCNC: 130 MG/DL (ref 74–99)
HCT VFR BLD AUTO: 25.7 % (ref 36–48)
HCT VFR BLD AUTO: 27.7 % (ref 36–48)
HCT VFR BLD AUTO: 28.5 % (ref 36–48)
HGB BLD-MCNC: 7.9 G/DL (ref 13–16)
HGB BLD-MCNC: 8.4 G/DL (ref 13–16)
HGB BLD-MCNC: 8.6 G/DL (ref 13–16)
IMM GRANULOCYTES # BLD AUTO: 0.1 K/UL (ref 0–0.04)
IMM GRANULOCYTES NFR BLD AUTO: 1 % (ref 0–0.5)
LYMPHOCYTES # BLD: 0.6 K/UL (ref 0.9–3.6)
LYMPHOCYTES NFR BLD: 8 % (ref 21–52)
MAGNESIUM SERPL-MCNC: 1.3 MG/DL (ref 1.6–2.6)
MCH RBC QN AUTO: 26 PG (ref 24–34)
MCHC RBC AUTO-ENTMCNC: 30.7 G/DL (ref 31–37)
MCV RBC AUTO: 84.5 FL (ref 78–100)
MONOCYTES # BLD: 0.6 K/UL (ref 0.05–1.2)
MONOCYTES NFR BLD: 7 % (ref 3–10)
NEUTS SEG # BLD: 7.1 K/UL (ref 1.8–8)
NEUTS SEG NFR BLD: 84 % (ref 40–73)
NRBC # BLD: 0 K/UL (ref 0–0.01)
NRBC BLD-RTO: 0 PER 100 WBC
PLATELET # BLD AUTO: 225 K/UL (ref 135–420)
PMV BLD AUTO: 11.7 FL (ref 9.2–11.8)
POTASSIUM SERPL-SCNC: 3 MMOL/L (ref 3.5–5.5)
PROT SERPL-MCNC: 6.2 G/DL (ref 6.4–8.2)
RBC # BLD AUTO: 3.04 M/UL (ref 4.35–5.65)
SODIUM SERPL-SCNC: 140 MMOL/L (ref 136–145)
SPECIMEN EXP DATE BLD: NORMAL
UNIT DIVISION: 0
UNIT DIVISION: 0
UNIT ISSUE DATE/TIME: NORMAL
UNIT ISSUE DATE/TIME: NORMAL
WBC # BLD AUTO: 8.5 K/UL (ref 4.6–13.2)

## 2024-07-29 PROCEDURE — 7100000000 HC PACU RECOVERY - FIRST 15 MIN: Performed by: INTERNAL MEDICINE

## 2024-07-29 PROCEDURE — 80053 COMPREHEN METABOLIC PANEL: CPT

## 2024-07-29 PROCEDURE — 5A09357 ASSISTANCE WITH RESPIRATORY VENTILATION, LESS THAN 24 CONSECUTIVE HOURS, CONTINUOUS POSITIVE AIRWAY PRESSURE: ICD-10-PCS | Performed by: INTERNAL MEDICINE

## 2024-07-29 PROCEDURE — 6360000002 HC RX W HCPCS: Performed by: PHYSICIAN ASSISTANT

## 2024-07-29 PROCEDURE — 3700000000 HC ANESTHESIA ATTENDED CARE: Performed by: INTERNAL MEDICINE

## 2024-07-29 PROCEDURE — 3600007502: Performed by: INTERNAL MEDICINE

## 2024-07-29 PROCEDURE — 2580000003 HC RX 258: Performed by: INTERNAL MEDICINE

## 2024-07-29 PROCEDURE — 1100000000 HC RM PRIVATE

## 2024-07-29 PROCEDURE — 99232 SBSQ HOSP IP/OBS MODERATE 35: CPT | Performed by: INTERNAL MEDICINE

## 2024-07-29 PROCEDURE — 94660 CPAP INITIATION&MGMT: CPT

## 2024-07-29 PROCEDURE — 2709999900 HC NON-CHARGEABLE SUPPLY: Performed by: INTERNAL MEDICINE

## 2024-07-29 PROCEDURE — 6370000000 HC RX 637 (ALT 250 FOR IP): Performed by: STUDENT IN AN ORGANIZED HEALTH CARE EDUCATION/TRAINING PROGRAM

## 2024-07-29 PROCEDURE — 85014 HEMATOCRIT: CPT

## 2024-07-29 PROCEDURE — 82962 GLUCOSE BLOOD TEST: CPT

## 2024-07-29 PROCEDURE — 6360000002 HC RX W HCPCS: Performed by: INTERNAL MEDICINE

## 2024-07-29 PROCEDURE — 85018 HEMOGLOBIN: CPT

## 2024-07-29 PROCEDURE — 6370000000 HC RX 637 (ALT 250 FOR IP): Performed by: INTERNAL MEDICINE

## 2024-07-29 PROCEDURE — 36415 COLL VENOUS BLD VENIPUNCTURE: CPT

## 2024-07-29 PROCEDURE — 85025 COMPLETE CBC W/AUTO DIFF WBC: CPT

## 2024-07-29 PROCEDURE — 6360000002 HC RX W HCPCS: Performed by: STUDENT IN AN ORGANIZED HEALTH CARE EDUCATION/TRAINING PROGRAM

## 2024-07-29 PROCEDURE — 2500000003 HC RX 250 WO HCPCS: Performed by: STUDENT IN AN ORGANIZED HEALTH CARE EDUCATION/TRAINING PROGRAM

## 2024-07-29 PROCEDURE — 83735 ASSAY OF MAGNESIUM: CPT

## 2024-07-29 PROCEDURE — 7100000010 HC PHASE II RECOVERY - FIRST 15 MIN: Performed by: INTERNAL MEDICINE

## 2024-07-29 PROCEDURE — 0DJ08ZZ INSPECTION OF UPPER INTESTINAL TRACT, VIA NATURAL OR ARTIFICIAL OPENING ENDOSCOPIC: ICD-10-PCS | Performed by: INTERNAL MEDICINE

## 2024-07-29 PROCEDURE — 2580000003 HC RX 258: Performed by: STUDENT IN AN ORGANIZED HEALTH CARE EDUCATION/TRAINING PROGRAM

## 2024-07-29 RX ORDER — POTASSIUM CHLORIDE 7.45 MG/ML
10 INJECTION INTRAVENOUS PRN
Status: DISCONTINUED | OUTPATIENT
Start: 2024-07-29 | End: 2024-08-09 | Stop reason: HOSPADM

## 2024-07-29 RX ORDER — GLYCOPYRROLATE 0.2 MG/ML
INJECTION INTRAMUSCULAR; INTRAVENOUS PRN
Status: DISCONTINUED | OUTPATIENT
Start: 2024-07-29 | End: 2024-07-29 | Stop reason: SDUPTHER

## 2024-07-29 RX ORDER — PROPOFOL 10 MG/ML
INJECTION, EMULSION INTRAVENOUS PRN
Status: DISCONTINUED | OUTPATIENT
Start: 2024-07-29 | End: 2024-07-29 | Stop reason: SDUPTHER

## 2024-07-29 RX ORDER — LIDOCAINE HYDROCHLORIDE 20 MG/ML
INJECTION, SOLUTION EPIDURAL; INFILTRATION; INTRACAUDAL; PERINEURAL PRN
Status: DISCONTINUED | OUTPATIENT
Start: 2024-07-29 | End: 2024-07-29 | Stop reason: SDUPTHER

## 2024-07-29 RX ORDER — POTASSIUM CHLORIDE 20 MEQ/1
40 TABLET, EXTENDED RELEASE ORAL PRN
Status: DISCONTINUED | OUTPATIENT
Start: 2024-07-29 | End: 2024-08-09 | Stop reason: HOSPADM

## 2024-07-29 RX ORDER — MAGNESIUM SULFATE IN WATER 40 MG/ML
2000 INJECTION, SOLUTION INTRAVENOUS PRN
Status: DISCONTINUED | OUTPATIENT
Start: 2024-07-29 | End: 2024-08-09 | Stop reason: HOSPADM

## 2024-07-29 RX ADMIN — SODIUM CHLORIDE, PRESERVATIVE FREE 10 ML: 5 INJECTION INTRAVENOUS at 08:56

## 2024-07-29 RX ADMIN — FUROSEMIDE 40 MG: 10 INJECTION, SOLUTION INTRAMUSCULAR; INTRAVENOUS at 08:55

## 2024-07-29 RX ADMIN — TAMSULOSIN HYDROCHLORIDE 0.4 MG: 0.4 CAPSULE ORAL at 08:53

## 2024-07-29 RX ADMIN — POTASSIUM CHLORIDE 10 MEQ: 7.46 INJECTION, SOLUTION INTRAVENOUS at 07:30

## 2024-07-29 RX ADMIN — POTASSIUM CHLORIDE 10 MEQ: 7.46 INJECTION, SOLUTION INTRAVENOUS at 10:26

## 2024-07-29 RX ADMIN — DILTIAZEM HYDROCHLORIDE 120 MG: 120 CAPSULE, EXTENDED RELEASE ORAL at 08:53

## 2024-07-29 RX ADMIN — POTASSIUM CHLORIDE 10 MEQ: 7.46 INJECTION, SOLUTION INTRAVENOUS at 18:04

## 2024-07-29 RX ADMIN — TRAMADOL HYDROCHLORIDE 25 MG: 50 TABLET ORAL at 10:49

## 2024-07-29 RX ADMIN — POTASSIUM CHLORIDE 10 MEQ: 7.46 INJECTION, SOLUTION INTRAVENOUS at 19:29

## 2024-07-29 RX ADMIN — GLYCOPYRROLATE 0.1 MG: 0.2 INJECTION INTRAMUSCULAR; INTRAVENOUS at 14:50

## 2024-07-29 RX ADMIN — FUROSEMIDE 40 MG: 10 INJECTION, SOLUTION INTRAMUSCULAR; INTRAVENOUS at 17:58

## 2024-07-29 RX ADMIN — ATORVASTATIN CALCIUM 40 MG: 40 TABLET, FILM COATED ORAL at 08:53

## 2024-07-29 RX ADMIN — GUAIFENESIN AND DEXTROMETHORPHAN 5 ML: 100; 10 SYRUP ORAL at 20:45

## 2024-07-29 RX ADMIN — SODIUM CHLORIDE, PRESERVATIVE FREE 40 MG: 5 INJECTION INTRAVENOUS at 17:58

## 2024-07-29 RX ADMIN — POTASSIUM CHLORIDE 10 MEQ: 7.46 INJECTION, SOLUTION INTRAVENOUS at 09:00

## 2024-07-29 RX ADMIN — FLUTICASONE PROPIONATE 1 SPRAY: 50 SPRAY, METERED NASAL at 09:01

## 2024-07-29 RX ADMIN — SODIUM CHLORIDE: 9 INJECTION, SOLUTION INTRAVENOUS at 14:45

## 2024-07-29 RX ADMIN — PROPOFOL 80 MG: 10 INJECTION, EMULSION INTRAVENOUS at 14:52

## 2024-07-29 RX ADMIN — CETIRIZINE HYDROCHLORIDE 10 MG: 10 TABLET, FILM COATED ORAL at 08:53

## 2024-07-29 RX ADMIN — SODIUM CHLORIDE, PRESERVATIVE FREE 40 MG: 5 INJECTION INTRAVENOUS at 04:03

## 2024-07-29 RX ADMIN — LIDOCAINE HYDROCHLORIDE 100 MG: 20 INJECTION, SOLUTION EPIDURAL; INFILTRATION; INTRACAUDAL; PERINEURAL at 14:52

## 2024-07-29 RX ADMIN — INSULIN GLARGINE 20 UNITS: 100 INJECTION, SOLUTION SUBCUTANEOUS at 20:46

## 2024-07-29 RX ADMIN — SODIUM CHLORIDE, PRESERVATIVE FREE 5 ML: 5 INJECTION INTRAVENOUS at 20:43

## 2024-07-29 RX ADMIN — POTASSIUM CHLORIDE 10 MEQ: 7.46 INJECTION, SOLUTION INTRAVENOUS at 20:43

## 2024-07-29 RX ADMIN — MAGNESIUM SULFATE HEPTAHYDRATE 2000 MG: 40 INJECTION, SOLUTION INTRAVENOUS at 12:21

## 2024-07-29 RX ADMIN — MAGNESIUM SULFATE HEPTAHYDRATE 2000 MG: 40 INJECTION, SOLUTION INTRAVENOUS at 10:43

## 2024-07-29 RX ADMIN — FINASTERIDE 5 MG: 5 TABLET, FILM COATED ORAL at 08:53

## 2024-07-29 RX ADMIN — PROPOFOL 30 MG: 10 INJECTION, EMULSION INTRAVENOUS at 14:53

## 2024-07-29 ASSESSMENT — PAIN SCALES - GENERAL
PAINLEVEL_OUTOF10: 0
PAINLEVEL_OUTOF10: 5
PAINLEVEL_OUTOF10: 0

## 2024-07-29 ASSESSMENT — PAIN DESCRIPTION - LOCATION: LOCATION: KNEE

## 2024-07-29 ASSESSMENT — PAIN - FUNCTIONAL ASSESSMENT
PAIN_FUNCTIONAL_ASSESSMENT: ACTIVITIES ARE NOT PREVENTED
PAIN_FUNCTIONAL_ASSESSMENT: 0-10
PAIN_FUNCTIONAL_ASSESSMENT: 0-10

## 2024-07-29 ASSESSMENT — PAIN DESCRIPTION - ORIENTATION: ORIENTATION: RIGHT

## 2024-07-29 ASSESSMENT — PAIN DESCRIPTION - DESCRIPTORS: DESCRIPTORS: ACHING

## 2024-07-29 NOTE — CARE COORDINATION
07/29/24 1617   Service Assessment   Patient Orientation Alert and Oriented;Person;Place;Situation;Self   Cognition Alert   History Provided By Patient   Primary Caregiver Self   Accompanied By/Relationship Patient is currently alone in the room   Support Systems Spouse/Significant Other;Family Members   Patient's Healthcare Decision Maker is: Legal Next of Kin   PCP Verified by CM Yes   Last Visit to PCP Within last 3 months   Prior Functional Level Independent in ADLs/IADLs   Current Functional Level Independent in ADLs/IADLs   Can patient return to prior living arrangement Yes   Ability to make needs known: Good   Would you like for me to discuss the discharge plan with any other family members/significant others, and if so, who? Yes   Financial Resources Medicare   Community Resources None   CM/SW Referral Other (see comment)  (Discharge Planning)   Social/Functional History   Lives With Spouse   Type of Home House   Home Layout One level   Home Access Level entry   Bathroom Shower/Tub Walk-in shower   Bathroom Toilet Standard   Bathroom Equipment Grab bars in shower   Bathroom Accessibility Accessible   Home Equipment Walker - Rolling;Walker - Standard;Cane;Rollator   Receives Help From Family   ADL Assistance Independent   Homemaking Assistance Independent   Homemaking Responsibilities Yes   Ambulation Assistance Independent   Active  Yes   Mode of Transportation Car   Education Not Applicable   Occupation Retired   Type of Occupation Not Appicable   Discharge Planning   Type of Residence House   Living Arrangements Spouse/Significant Other   Current Services Prior To Admission Durable Medical Equipment   Current DME Prior to Arrival Cane;Shower Chair;Walker   Potential Assistance Needed Transportation   DME Ordered? No   Potential Assistance Purchasing Medications No   Type of Home Care Services None   Patient expects to be discharged to: House   Follow Up Appointment: Best Day/Time    (TBD)    One/Two Story Residence One story   History of falls? 1   Services At/After Discharge   Transition of Care Consult (CM Consult) Discharge Planning   Services At/After Discharge None    Resource Information Provided? No   Mode of Transport at Discharge Self  (Family to transport)   Confirm Follow Up Transport Self   Condition of Participation: Discharge Planning   The Plan for Transition of Care is related to the following treatment goals: Anticipate to home with available resouces   The Patient and/or Patient Representative was provided with a Choice of Provider? Patient   The Patient and/Or Patient Representative agree with the Discharge Plan? Yes   Freedom of Choice list was provided with basic dialogue that supports the patient's individualized plan of care/goals, treatment preferences, and shares the quality data associated with the providers?  Yes  (FOC list given to patient at bedside)   Ghulam Piper BSW  Case Management

## 2024-07-29 NOTE — ANESTHESIA POSTPROCEDURE EVALUATION
Department of Anesthesiology  Postprocedure Note    Patient: Max Aly  MRN: [id number]  YOB: 1950  Date of evaluation: 7/29/2024    Procedure Summary       Date: 07/29/24 Room / Location: [facility] ENDO 02 / [facility] ENDOSCOPY    Anesthesia Start: 1447 Anesthesia Stop: 1502    Procedure: ESOPHAGOGASTRODUODENOSCOPY (Upper GI Region) Diagnosis: Anemia, unspecified type    Surgeons: Barb Lozano MD Responsible Provider: Devan Trivedi MD    Anesthesia Type: TIVA, MAC ASA Status: 3 - Emergent            Anesthesia Type: TIVA, MAC    Dixon Phase I: Dixon Score: 7    Dixon Phase II:      Anesthesia Post Evaluation    Patient location during evaluation: bedside  Patient participation: complete - patient participated  Level of consciousness: responsive to verbal stimuli  Airway patency: patent  Nausea & Vomiting: no nausea  Respiratory status: acceptable  Hydration status: euvolemic    No notable events documented.

## 2024-07-29 NOTE — PERIOP NOTE
TRANSFER - IN REPORT:    Verbal report received from DAX Nelson on Max Aly  being received from [address] [place] [address] (Room 456) for ordered procedure      Report consisted of patient's Situation, Background, Assessment and   Recommendations(SBAR).     Information from the following report(s) Adult Overview, Intake/Output, MAR, Recent Results, and Cardiac Rhythm NSR  was reviewed with the receiving nurse.    Opportunity for questions and clarification was provided.      Assessment completed upon patient's arrival to unit and care assumed.

## 2024-07-29 NOTE — PROGRESS NOTES
[facility] - Progress Note    Admit Date: 7/26/2024  Attending Cardiologist: Dr. Pendleton    Assessment:     -Anemia, GI bleed.  Hx colitis.  S/p colonoscopy 5/10/2024 - inadequate colon preparation with stool in the entire examined colon, however showed diffuse severe mucosal changes in rectum, sigmoid colon, descending colon, transverse colon and proximal ascending colon secondary to colitis, some of the areas look like ?pseudomembranous colitis, biopsied; diverticulosis in sigmoid colon; one 4 mm polyp in the cecum that was biopsied  -Chronic peripheral edema, uses support stockings, not on maintenance diuretics  -Echo 5/14/2024 ([facility]): LVEF 50-55%, no significant valvular stenosis or regurgitation within limits of study  -Sick sinus syndrome, S/p PPM 05/2017, s/p gen change 12/2020 (Curiously) with chronic intermittent sensing of atrial lead noise  -Hx pulmonary embolism, 08/2020, 05/2024, s/p IVC filter, on Eliquis  -Hx acute occlusive superficial vein thrombosis in LUE cephalic vein upper arm  -R upper renal mass, followed by [facility]  -Hx Melody's gangrene, s/p extensive scrotal debridement  -Hx BPH, hematuria, retentions, s/p cystoscopy, clot evacuation, limited TURP  -HTN, on Losartan, Diltiazem; Hx cough with ACEi  -DMII  -HLD  -ASHLEY, uses CPAP  -Obesity     Primary cardiologist is Dr. Hunt at [facility]     Plan:     -Continue IV Lasix with strict I/O's and close monitoring of renal indices.  Pt diuresing well.    -Recommend to maintain K > 4, Mg > 2 from cardiac standpoint.  Replacement per primary team.  Discussed with RN to administer PRN Mg due to Mg 1.3 this AM.  -Echo pending, will review results as able.  -Continue Cardizem.    -Pending EGD today per GI team, appreciate assistance.    Subjective:     No new complaints.     Objective:      Patient Vitals for the past 8 hrs:   Temp Pulse Resp BP SpO2   07/29/24 1200 -- 90 -- -- --   07/29/24 1156 98.8 °F (37.1 °C)  29.8* 25.7*     --   --   --  225    < > = values in this interval not displayed.      Cardiac Enzymes Lab Results   Component Value Date/Time    TROPHS 74 05/04/2023 07:13 PM    TROPHS 95 05/04/2023 06:25 AM      Coagulation Recent Labs     07/28/24  0735   INR 1.3*   APTT 36.0       Liver Enzymes Lab Results   Component Value Date    ALT 10 (L) 07/29/2024    AST 10 07/29/2024    ALKPHOS 76 07/29/2024    BILITOT 1.1 (H) 07/29/2024      Thyroid Studies Lab Results   Component Value Date/Time    TSH 1.39 05/04/2023 06:25 AM          Signed By: Lucy Villagomez PA-C     July 29, 2024

## 2024-07-29 NOTE — PLAN OF CARE
Problem: Safety - Adult  Goal: Free from fall injury  Outcome: Progressing  Flowsheets (Taken 7/29/2024 1623)  Free From Fall Injury:   Based on caregiver fall risk screen, instruct family/caregiver to ask for assistance with transferring infant if caregiver noted to have fall risk factors   Instruct family/caregiver on patient safety     Problem: Pain  Goal: Verbalizes/displays adequate comfort level or baseline comfort level  Outcome: Progressing  Flowsheets  Taken 7/29/2024 1521 by Bennett Sims RN  Verbalizes/displays adequate comfort level or baseline comfort level: Assess pain using appropriate pain scale  Taken 7/29/2024 1511 by Bennett Sims RN  Verbalizes/displays adequate comfort level or baseline comfort level: Assess pain using appropriate pain scale  Taken 7/29/2024 1501 by Bennett Sims RN  Verbalizes/displays adequate comfort level or baseline comfort level: Assess pain using appropriate pain scale     Problem: Discharge Planning  Goal: Discharge to home or other facility with appropriate resources  Outcome: Progressing  Flowsheets (Taken 7/29/2024 0853)  Discharge to home or other facility with appropriate resources:   Identify barriers to discharge with patient and caregiver   Arrange for needed discharge resources and transportation as appropriate     Problem: Respiratory - Adult  Goal: Achieves optimal ventilation and oxygenation  Outcome: Progressing  Flowsheets (Taken 7/29/2024 0853)  Achieves optimal ventilation and oxygenation:   Assess for changes in respiratory status   Assess for changes in mentation and behavior     Problem: Cardiovascular - Adult  Goal: Maintains optimal cardiac output and hemodynamic stability  Outcome: Progressing  Goal: Absence of cardiac dysrhythmias or at baseline  Outcome: Progressing     Problem: Skin/Tissue Integrity - Adult  Goal: Skin integrity remains intact  Outcome: Progressing  Flowsheets  Taken 7/29/2024 1623  Skin Integrity Remains Intact:    Monitor for areas of redness and/or skin breakdown   Assess vascular access sites hourly  Taken 7/29/2024 0853  Skin Integrity Remains Intact:   Monitor for areas of redness and/or skin breakdown   Assess vascular access sites hourly  Goal: Incisions, wounds, or drain sites healing without S/S of infection  Outcome: Progressing  Flowsheets  Taken 7/29/2024 1623  Incisions, Wounds, or Drain Sites Healing Without Sign and Symptoms of Infection: ADMISSION and DAILY: Assess and document risk factors for pressure ulcer development  Taken 7/29/2024 0853  Incisions, Wounds, or Drain Sites Healing Without Sign and Symptoms of Infection: ADMISSION and DAILY: Assess and document risk factors for pressure ulcer development  Goal: Oral mucous membranes remain intact  Outcome: Progressing  Flowsheets (Taken 7/29/2024 0853)  Oral Mucous Membranes Remain Intact: Assess oral mucosa and hygiene practices     Problem: Hematologic - Adult  Goal: Maintains hematologic stability  Outcome: Progressing  Flowsheets (Taken 7/29/2024 0853)  Maintains hematologic stability:   Assess for signs and symptoms of bleeding or hemorrhage   Monitor labs for bleeding or clotting disorders     Problem: Skin/Tissue Integrity  Goal: Absence of new skin breakdown  Description: 1.  Monitor for areas of redness and/or skin breakdown  2.  Assess vascular access sites hourly  3.  Every 4-6 hours minimum:  Change oxygen saturation probe site  4.  Every 4-6 hours:  If on nasal continuous positive airway pressure, respiratory therapy assess nares and determine need for appliance change or resting period.  Outcome: Progressing     Problem: Chronic Conditions and Co-morbidities  Goal: Patient's chronic conditions and co-morbidity symptoms are monitored and maintained or improved  Outcome: Progressing  Flowsheets (Taken 7/29/2024 0853)  Care Plan - Patient's Chronic Conditions and Co-Morbidity Symptoms are Monitored and Maintained or Improved:   Monitor and assess  patient's chronic conditions and comorbid symptoms for stability, deterioration, or improvement   Collaborate with multidisciplinary team to address chronic and comorbid conditions and prevent exacerbation or deterioration

## 2024-07-29 NOTE — PERIOP NOTE
TRANSFER - OUT REPORT:    Verbal report given to DAX Nelson on Max Aly  being transferred to [address] [place] [address] (Room 456) for routine post-op       Report consisted of patient's Situation, Background, Assessment and   Recommendations(SBAR).     Information from the following report(s) Adult Overview, Surgery Report, Intake/Output, MAR, Recent Results, and Cardiac Rhythm NSR  was reviewed with the receiving nurse.           Lines:   Peripheral IV Posterior;Right;Ventral Hand (Active)   Site Assessment Clean, dry & intact 07/29/24 0400   Line Status Flushed 07/29/24 0400   Line Care Connections checked and tightened 07/29/24 0400   Phlebitis Assessment No symptoms 07/29/24 0400   Infiltration Assessment 0 07/29/24 0400   Alcohol Cap Used Yes 07/29/24 0400   Dressing Status Clean, dry & intact 07/29/24 0400   Dressing Type Transparent 07/29/24 0400        Opportunity for questions and clarification was provided.      Patient transported with:  O2 @ 3lpm and Tech

## 2024-07-29 NOTE — PROCEDURES
[email or web address]  [phone]     Endoscopic Gastroduodenoscopy Procedure Note    Max Aly  1950  [id number]    Indication: 1. Anemia  2. Heme positive stool     : Brab Lozano MD    Surgical Assistants: none    Referring Provider:  Louis, Claude, MD    Anesthesia/Sedation:  MAC anesthesia Propofol      Procedure Details     After infomed consent was obtained for the procedure, with all risks and benefits of procedure explained the patient was taken to the endoscopy suite and placed in the left lateral decubitus position.  Following sequential administration of sedation as per above, the endoscope was inserted into the mouth and advanced under direct vision to second portion of the duodenum.  A careful inspection was made as the gastroscope was withdrawn, including a retroflexed view of the proximal stomach; findings and interventions are described below.      Findings:   Esophagus:normal  Stomach: normal   Duodenum/jejunum: normal    Therapies:  none    Specimens: * No specimens in log *           Complications:   None; patient tolerated the procedure well.    EBL:  None.    Tissue Implant Device: None           Impression:    1. Normal EGD, no evidence of active or recent hemorrhage     Recommendations:  -Continue PPI daily  -May resume diet  -Monitor H/H and clinically for overt brisk hemorrhage   -May resume Eliquis tomorrow if remains stable    Disposition: Return to medical floor for ongoing care.    Barb Lozano MD  7/29/2024  2:56 PM    Barb Lozano MD  [facility]  Office/pager - [phone]  Cell - [phone]  [email or web address]

## 2024-07-29 NOTE — PROGRESS NOTES
[facility]ist [facility]  Progress Note    Patient: Max Ayl Age: 74 y.o. : 1950 MR#: [id number] SSN: [id number]  Date: 2024                 DVT Prophylaxis:  []Lovenox  []Hep SQ  [x]SCDs  []Coumadin   []On Heparin gtt []PO anticoagulant    Anticipated discharge: 2024 to home, pending clinical course    Subjective:     The patient was seen and examined at the bedside in follow-up for gastrointestinal bleeding, CHF exacerbation and acute on chronic anemia among other issues.  The patient underwent an EGD today, the results of which were normal.  He was resting in bed comfortably and was in no acute distress.  He denied any new complaints today.      Objective:   VS: BP (!) 112/56   Pulse 68   Temp 98 °F (36.7 °C) (Oral)   Resp 18   Ht 1.778 m (5' 10\")   Wt 131.2 kg (289 lb 4.8 oz)   SpO2 97%   BMI 41.51 kg/m²    Tmax/24hrs: Temp (24hrs), Av.9 °F (37.2 °C), Min:97.6 °F (36.4 °C), Max:100.2 °F (37.9 °C)    Intake/Output Summary (Last 24 hours) at 2024 1605  Last data filed at 2024 1455  Gross per 24 hour   Intake 385.83 ml   Output 2150 ml   Net -1764.17 ml        PHYSICAL EXAM  General Appearance: NAD, conversant; obese appearing  HENT: normocephalic/atraumatic, moist mucus membranes  Neck: No JVD, supple  Lungs: Faint inspiratory rhonchi auscultated anteriorly; breath sounds are diminished  CV: RRR, no m/r/g  Abdomen: soft, non-tender, normal bowel sounds  Extremities: no cyanosis, 2+ bilateral lower extremity pitting edema present  Neuro: No focal deficits, motor/sensory intact  Skin: Normal color, intact  Psych: appropriate affect, alert and oriented to person, place and time    Current Facility-Administered Medications   Medication Dose Route Frequency    potassium chloride (KLOR-CON M) extended release tablet 40 mEq  40 mEq Oral PRN    Or    potassium bicarb-citric acid (EFFER-K) effervescent tablet 40 mEq  40 mEq Oral PRN    Or     if hemoglobin and hematocrit are stable      2.  Acute on chronic anemia  Patient has acute blood loss anemia superimposed on anemia of chronic inflammation  Continue to monitor hemoglobin and hematocrit.     3.  Heart failure with preserved ejection fraction  2D echo from May 2024 revealed an EF of 50 to 55% with indeterminate diastolic function.  The patient likely has acute on chronic diastolic CHF.  Await results of repeat 2D echo  Continue IV furosemide for diuresis.  Monitor intake and output.  Measure weight on a daily basis.  Continue low-sodium diet and 1500 cc fluid restriction  Cardiology input appreciated-discussed with cardiology      4.  Stage III chronic kidney disease  Renal function stable; continue to monitor    5.  History of venous thromboembolism  Patient was found to have a submassive PE, and right lower extremity DVT in May 2023.  He underwent placement of an IVC filter during that hospitalization.  Hold apixaban because of blood loss anemia    6.  Type 2 diabetes mellitus  Hold oral antidiabetic medications in the hospital.  Continue Lantus and correction dose insulin.  Continue carbohydrate controlled diet    7.  Hyperlipidemia  Continue atorvastatin    8.  Hypokalemia  Likely secondary to renal losses of potassium, and to hypomagnesemia  Replace both potassium and magnesium and recheck levels    9.  Hypomagnesemia  Likely secondary to renal losses of magnesium  Replace magnesium and recheck levels    10.  Benign prostatic hyperplasia  Continue tamsulosin and finasteride    11.  History of sick sinus syndrome  S/p permanent pacemaker placement    12. Hypertension  Continue diltiazem.  Hold hydrochlorothiazide as the patient is at risk of KIRIT.    .Please contact Dr. Joseph if there are any questions. Greater than 50 minutes were spent reviewing the patient's chart, obtaining a thorough history, performing a physical exam, placing orders and dictating this document.  Findings and plan were

## 2024-07-29 NOTE — PROGRESS NOTES
[email or web address]  [phone]    Gastroenterology Progress Note    Impression:  Acute GI bleed-no active bleed at present.  Patient has been on Eliquis for atrial fibrillation and pulmonary embolus. H/H is actually at baseline  KIRIT, likely cardiorenal syndrome  Chronic anemia  Hemoccult positive stool  History of BPH  History of Melody's gangrene  Hyperlipidemia  Type 2 diabetes  Hepatitis C  Paroxysmal atrial fibrillation on Eliquis  History of sick sinus syndrome status post pacemaker 12/2020  ASHLEY, unknown compliance  Recent episode of colitis 5/2024  Morbid obesity    Plan:  1. EGD planned for today, continue NPO status, anticoagulation held.   2. IV PPI BID  3. Monitor H/H, transfuse if Hgb <7.  4. Continue medical management per primary.     Chief Complaint: anemia      Subjective:  doing well this AM, agreeable to EGD. No GI complaints today.     ROS: Denies any fevers, chills, nausea, vomiting.       General: well nourished, no acute distress  Eyes: conjunctiva normal, EOM normal  Pulmonary: breath effort normal  Abdominal: non-distended, soft, non-tender, non-acute  Patient Active Problem List   Diagnosis    Bilateral pulmonary embolism (HCC)    Hypertension    Hyperlipidemia    Type 2 diabetes mellitus with stage 3a chronic kidney disease, without long-term current use of insulin (HCC)    ASHLEY on CPAP    Hepatitis C    CKD (chronic kidney disease)    Acute on chronic blood loss anemia    Acute kidney injury superimposed on chronic kidney disease (HCC)    History of Melody's gangrene    Benign prostatic hyperplasia without lower urinary tract symptoms    Paroxysmal atrial fibrillation (HCC)    Diverticulitis    Encounter for palliative care    Advanced care planning/counseling discussion    Anemia of chronic disease    Debility    Melody's gangrene of scrotum    Chronic atrial fibrillation (HCC)    Diverticulitis of colon    Gastrointestinal bleeding    Stage 3a chronic kidney disease (HCC)

## 2024-07-29 NOTE — ANESTHESIA PRE PROCEDURE
found for: \"LABABO\"    Drug/Infectious Status (If Applicable):  No results found for: \"HIV\", \"HEPCAB\"    COVID-19 Screening (If Applicable):   Lab Results   Component Value Date/Time    COVID19 Not detected 07/26/2024 08:55 PM           Anesthesia Evaluation  Patient summary reviewed  Airway: Mallampati: III  TM distance: >3 FB   Neck ROM: limited  Mouth opening: > = 3 FB   Dental:    (+) poor dentition      Pulmonary: breath sounds clear to auscultation  (+)     sleep apnea: on CPAP,                                  Cardiovascular:  Exercise tolerance: poor (<4 METS)  (+) hypertension: moderate, dysrhythmias: atrial fibrillation, CHF:        Rhythm: regular  Rate: normal                    Neuro/Psych:   Negative Neuro/Psych ROS              GI/Hepatic/Renal:   (+) hepatitis: C, liver disease:, renal disease: CRI          Endo/Other:    (+) DiabetesType II DM, well controlled, blood dyscrasia: anemia:..                 Abdominal:             Vascular: negative vascular ROS.         Other Findings:       Anesthesia Plan      MAC     ASA 3 - emergent       Induction: intravenous.      Anesthetic plan and risks discussed with patient.                    CANDIE SIMON MD   7/29/2024

## 2024-07-29 NOTE — PROGRESS NOTES
Patient has dx of ASHLEY and uses a CPAP at night at home. Setup patient on s Resmed in the Auto CPAP 12-26. With a FFM Medium

## 2024-07-30 ENCOUNTER — APPOINTMENT (OUTPATIENT)
Facility: HOSPITAL | Age: 74
End: 2024-07-30
Attending: STUDENT IN AN ORGANIZED HEALTH CARE EDUCATION/TRAINING PROGRAM
Payer: MEDICARE

## 2024-07-30 ENCOUNTER — APPOINTMENT (OUTPATIENT)
Facility: HOSPITAL | Age: 74
End: 2024-07-30
Payer: MEDICARE

## 2024-07-30 PROBLEM — I35.1 SEVERE AORTIC REGURGITATION: Status: ACTIVE | Noted: 2024-07-30

## 2024-07-30 PROBLEM — I39 ENDOCARDITIS AND HEART VALVE DISORDERS IN DISEASES CLASSIFIED ELSEWHERE: Status: ACTIVE | Noted: 2024-07-30

## 2024-07-30 PROBLEM — I33.0 AORTIC VALVE VEGETATION: Status: ACTIVE | Noted: 2024-07-30

## 2024-07-30 LAB
ALBUMIN SERPL-MCNC: 2.2 G/DL (ref 3.4–5)
ALBUMIN/GLOB SERPL: 0.6 (ref 0.8–1.7)
ALP SERPL-CCNC: 68 U/L (ref 45–117)
ALT SERPL-CCNC: 8 U/L (ref 16–61)
ANION GAP SERPL CALC-SCNC: 9 MMOL/L (ref 3–18)
AST SERPL-CCNC: 12 U/L (ref 10–38)
BASOPHILS # BLD: 0 K/UL (ref 0–0.1)
BASOPHILS NFR BLD: 0 % (ref 0–2)
BILIRUB SERPL-MCNC: 1.7 MG/DL (ref 0.2–1)
BUN SERPL-MCNC: 16 MG/DL (ref 7–18)
BUN/CREAT SERPL: 12 (ref 12–20)
CALCIUM SERPL-MCNC: 8.7 MG/DL (ref 8.5–10.1)
CHLORIDE SERPL-SCNC: 107 MMOL/L (ref 100–111)
CO2 SERPL-SCNC: 23 MMOL/L (ref 21–32)
CREAT SERPL-MCNC: 1.29 MG/DL (ref 0.6–1.3)
DIFFERENTIAL METHOD BLD: ABNORMAL
ECHO AR MAX VEL PISA: 3.4 M/S
ECHO AV MEAN GRADIENT: 13 MMHG
ECHO AV MEAN VELOCITY: 1.6 M/S
ECHO AV PEAK GRADIENT: 28 MMHG
ECHO AV PEAK VELOCITY: 2.6 M/S
ECHO AV REGURGITANT PHT: 242.6 MILLISECOND
ECHO AV VELOCITY RATIO: 0.65
ECHO AV VTI: 47.7 CM
ECHO BSA: 2.54 M2
ECHO LA DIAMETER INDEX: 1.68 CM/M2
ECHO LA DIAMETER: 4.1 CM
ECHO LV EDV A2C: 332 ML
ECHO LV EDV A4C: 335 ML
ECHO LV EDV BP: 343 ML (ref 67–155)
ECHO LV EDV INDEX A4C: 137 ML/M2
ECHO LV EDV INDEX BP: 141 ML/M2
ECHO LV EDV NDEX A2C: 136 ML/M2
ECHO LV EJECTION FRACTION A2C: 59 %
ECHO LV EJECTION FRACTION A4C: 53 %
ECHO LV EJECTION FRACTION BIPLANE: 57 % (ref 55–100)
ECHO LV ESV A2C: 135 ML
ECHO LV ESV A4C: 158 ML
ECHO LV ESV BP: 146 ML (ref 22–58)
ECHO LV ESV INDEX A2C: 55 ML/M2
ECHO LV ESV INDEX A4C: 65 ML/M2
ECHO LV ESV INDEX BP: 60 ML/M2
ECHO LV FRACTIONAL SHORTENING: 30 % (ref 28–44)
ECHO LV INTERNAL DIMENSION DIASTOLE INDEX: 2.46 CM/M2
ECHO LV INTERNAL DIMENSION DIASTOLIC: 6 CM (ref 4.2–5.9)
ECHO LV INTERNAL DIMENSION SYSTOLIC INDEX: 1.72 CM/M2
ECHO LV INTERNAL DIMENSION SYSTOLIC: 4.2 CM
ECHO LV IVSD: 1.2 CM (ref 0.6–1)
ECHO LV MASS 2D: 279.6 G (ref 88–224)
ECHO LV MASS INDEX 2D: 114.6 G/M2 (ref 49–115)
ECHO LV POSTERIOR WALL DIASTOLIC: 1 CM (ref 0.6–1)
ECHO LV RELATIVE WALL THICKNESS RATIO: 0.33
ECHO LVOT AV VTI INDEX: 0.66
ECHO LVOT MEAN GRADIENT: 5 MMHG
ECHO LVOT PEAK GRADIENT: 12 MMHG
ECHO LVOT PEAK VELOCITY: 1.7 M/S
ECHO LVOT VTI: 31.3 CM
EOSINOPHIL # BLD: 0.1 K/UL (ref 0–0.4)
EOSINOPHIL NFR BLD: 1 % (ref 0–5)
ERYTHROCYTE [DISTWIDTH] IN BLOOD BY AUTOMATED COUNT: 17.1 % (ref 11.6–14.5)
GLOBULIN SER CALC-MCNC: 3.9 G/DL (ref 2–4)
GLUCOSE BLD STRIP.AUTO-MCNC: 181 MG/DL (ref 70–110)
GLUCOSE BLD STRIP.AUTO-MCNC: 184 MG/DL (ref 70–110)
GLUCOSE BLD STRIP.AUTO-MCNC: 213 MG/DL (ref 70–110)
GLUCOSE BLD STRIP.AUTO-MCNC: 98 MG/DL (ref 70–110)
GLUCOSE SERPL-MCNC: 94 MG/DL (ref 74–99)
HCT VFR BLD AUTO: 25.2 % (ref 36–48)
HCT VFR BLD AUTO: 26 % (ref 36–48)
HCT VFR BLD AUTO: 29.8 % (ref 36–48)
HGB BLD-MCNC: 7.6 G/DL (ref 13–16)
HGB BLD-MCNC: 7.7 G/DL (ref 13–16)
HGB BLD-MCNC: 8.8 G/DL (ref 13–16)
IMM GRANULOCYTES # BLD AUTO: 0 K/UL (ref 0–0.04)
IMM GRANULOCYTES NFR BLD AUTO: 1 % (ref 0–0.5)
LYMPHOCYTES # BLD: 0.6 K/UL (ref 0.9–3.6)
LYMPHOCYTES NFR BLD: 7 % (ref 21–52)
MAGNESIUM SERPL-MCNC: 1.9 MG/DL (ref 1.6–2.6)
MCH RBC QN AUTO: 25.5 PG (ref 24–34)
MCHC RBC AUTO-ENTMCNC: 29.6 G/DL (ref 31–37)
MCV RBC AUTO: 86.1 FL (ref 78–100)
MONOCYTES # BLD: 0.5 K/UL (ref 0.05–1.2)
MONOCYTES NFR BLD: 6 % (ref 3–10)
NEUTS SEG # BLD: 6.8 K/UL (ref 1.8–8)
NEUTS SEG NFR BLD: 85 % (ref 40–73)
NRBC # BLD: 0 K/UL (ref 0–0.01)
NRBC BLD-RTO: 0 PER 100 WBC
PLATELET # BLD AUTO: 232 K/UL (ref 135–420)
PMV BLD AUTO: 11.4 FL (ref 9.2–11.8)
POTASSIUM SERPL-SCNC: 3.5 MMOL/L (ref 3.5–5.5)
PROT SERPL-MCNC: 6.1 G/DL (ref 6.4–8.2)
RBC # BLD AUTO: 3.02 M/UL (ref 4.35–5.65)
SODIUM SERPL-SCNC: 139 MMOL/L (ref 136–145)
WBC # BLD AUTO: 8 K/UL (ref 4.6–13.2)

## 2024-07-30 PROCEDURE — 36415 COLL VENOUS BLD VENIPUNCTURE: CPT

## 2024-07-30 PROCEDURE — 6370000000 HC RX 637 (ALT 250 FOR IP): Performed by: STUDENT IN AN ORGANIZED HEALTH CARE EDUCATION/TRAINING PROGRAM

## 2024-07-30 PROCEDURE — 82962 GLUCOSE BLOOD TEST: CPT

## 2024-07-30 PROCEDURE — 83735 ASSAY OF MAGNESIUM: CPT

## 2024-07-30 PROCEDURE — 71260 CT THORAX DX C+: CPT

## 2024-07-30 PROCEDURE — 2700000000 HC OXYGEN THERAPY PER DAY

## 2024-07-30 PROCEDURE — 93321 DOPPLER ECHO F-UP/LMTD STD: CPT | Performed by: INTERNAL MEDICINE

## 2024-07-30 PROCEDURE — 1100000000 HC RM PRIVATE

## 2024-07-30 PROCEDURE — 85025 COMPLETE CBC W/AUTO DIFF WBC: CPT

## 2024-07-30 PROCEDURE — 6370000000 HC RX 637 (ALT 250 FOR IP): Performed by: INTERNAL MEDICINE

## 2024-07-30 PROCEDURE — 97535 SELF CARE MNGMENT TRAINING: CPT

## 2024-07-30 PROCEDURE — 99232 SBSQ HOSP IP/OBS MODERATE 35: CPT | Performed by: INTERNAL MEDICINE

## 2024-07-30 PROCEDURE — 93308 TTE F-UP OR LMTD: CPT | Performed by: INTERNAL MEDICINE

## 2024-07-30 PROCEDURE — 97162 PT EVAL MOD COMPLEX 30 MIN: CPT

## 2024-07-30 PROCEDURE — C8924 2D TTE W OR W/O FOL W/CON,FU: HCPCS

## 2024-07-30 PROCEDURE — 6360000002 HC RX W HCPCS: Performed by: INTERNAL MEDICINE

## 2024-07-30 PROCEDURE — 85018 HEMOGLOBIN: CPT

## 2024-07-30 PROCEDURE — 6360000004 HC RX CONTRAST MEDICATION: Performed by: INTERNAL MEDICINE

## 2024-07-30 PROCEDURE — 97166 OT EVAL MOD COMPLEX 45 MIN: CPT

## 2024-07-30 PROCEDURE — 87040 BLOOD CULTURE FOR BACTERIA: CPT

## 2024-07-30 PROCEDURE — 6360000002 HC RX W HCPCS: Performed by: PHYSICIAN ASSISTANT

## 2024-07-30 PROCEDURE — 87186 SC STD MICRODIL/AGAR DIL: CPT

## 2024-07-30 PROCEDURE — 6360000004 HC RX CONTRAST MEDICATION: Performed by: STUDENT IN AN ORGANIZED HEALTH CARE EDUCATION/TRAINING PROGRAM

## 2024-07-30 PROCEDURE — 97116 GAIT TRAINING THERAPY: CPT

## 2024-07-30 PROCEDURE — 2580000003 HC RX 258: Performed by: INTERNAL MEDICINE

## 2024-07-30 PROCEDURE — 99233 SBSQ HOSP IP/OBS HIGH 50: CPT | Performed by: STUDENT IN AN ORGANIZED HEALTH CARE EDUCATION/TRAINING PROGRAM

## 2024-07-30 PROCEDURE — 85014 HEMATOCRIT: CPT

## 2024-07-30 PROCEDURE — 80053 COMPREHEN METABOLIC PANEL: CPT

## 2024-07-30 PROCEDURE — 87077 CULTURE AEROBIC IDENTIFY: CPT

## 2024-07-30 PROCEDURE — 93325 DOPPLER ECHO COLOR FLOW MAPG: CPT | Performed by: INTERNAL MEDICINE

## 2024-07-30 PROCEDURE — 87154 CUL TYP ID BLD PTHGN 6+ TRGT: CPT

## 2024-07-30 PROCEDURE — 94761 N-INVAS EAR/PLS OXIMETRY MLT: CPT

## 2024-07-30 RX ADMIN — FUROSEMIDE 40 MG: 10 INJECTION, SOLUTION INTRAMUSCULAR; INTRAVENOUS at 09:28

## 2024-07-30 RX ADMIN — TAMSULOSIN HYDROCHLORIDE 0.4 MG: 0.4 CAPSULE ORAL at 09:26

## 2024-07-30 RX ADMIN — IOPAMIDOL 100 ML: 612 INJECTION, SOLUTION INTRAVENOUS at 17:38

## 2024-07-30 RX ADMIN — FINASTERIDE 5 MG: 5 TABLET, FILM COATED ORAL at 09:26

## 2024-07-30 RX ADMIN — SODIUM CHLORIDE, PRESERVATIVE FREE 5 ML: 5 INJECTION INTRAVENOUS at 20:54

## 2024-07-30 RX ADMIN — FUROSEMIDE 40 MG: 10 INJECTION, SOLUTION INTRAMUSCULAR; INTRAVENOUS at 18:05

## 2024-07-30 RX ADMIN — ATORVASTATIN CALCIUM 40 MG: 40 TABLET, FILM COATED ORAL at 09:26

## 2024-07-30 RX ADMIN — DILTIAZEM HYDROCHLORIDE 120 MG: 120 CAPSULE, EXTENDED RELEASE ORAL at 09:26

## 2024-07-30 RX ADMIN — SODIUM CHLORIDE, PRESERVATIVE FREE 10 ML: 5 INJECTION INTRAVENOUS at 09:32

## 2024-07-30 RX ADMIN — PERFLUTREN 2 ML: 6.52 INJECTION, SUSPENSION INTRAVENOUS at 08:49

## 2024-07-30 RX ADMIN — INSULIN GLARGINE 20 UNITS: 100 INJECTION, SOLUTION SUBCUTANEOUS at 20:43

## 2024-07-30 RX ADMIN — CETIRIZINE HYDROCHLORIDE 10 MG: 10 TABLET, FILM COATED ORAL at 09:26

## 2024-07-30 RX ADMIN — FLUTICASONE PROPIONATE 1 SPRAY: 50 SPRAY, METERED NASAL at 09:31

## 2024-07-30 RX ADMIN — SODIUM CHLORIDE, PRESERVATIVE FREE 40 MG: 5 INJECTION INTRAVENOUS at 04:19

## 2024-07-30 RX ADMIN — SODIUM CHLORIDE, PRESERVATIVE FREE 40 MG: 5 INJECTION INTRAVENOUS at 18:05

## 2024-07-30 ASSESSMENT — PAIN SCALES - GENERAL
PAINLEVEL_OUTOF10: 0

## 2024-07-30 NOTE — CONSULTS
Infectious Disease Consultation Note        Reason: Evaluate for infective endocarditis    Current abx Prior abx         Lines:       Assessment :  80-year-old man with past medical history significant for paroxysmal atrial fibrillation, type 2 diabetes, hepatitis C, BPH, obesity, sleep apnea, C. difficile colitis-positive PCR October 2023 admitted to [facility] on 7/26/2024 for SOB.     Hospitalization at [facility] 05/24/2024-05/31/2024 for hematuria    Evidence of pseudomembranous colitis colonoscopy 5/2024 at [place]. placed prednisone taper per GI    Mass noted on right kidney on ct scan 5/2024  seen by Urology. Suspicion for renal cell carcinoma.    Now with possible vegetation on aortic valve, severe AR noted on echo 7/30/24 (not seen on prior transthoracic echo May 2024 at [facility])    Echocardiographic findings highly concerning for infective endocarditis.    However lack of fever, leukocytosis or constitutional symptoms argue against acute bacterial endocarditis.    Differential diagnosis at this time includes-marantic/non bacterial thrombotic endocarditis versus endocarditis due to indolent pathogens    Marantic endocarditis is not typically associated with valvular destruction/regurgitation but there are case reports of this in literature. Will need further imaging/lab studies prior to making antibiotic decisions.     Antibiotic management further complicated due to prior history of C. difficile colitis, recent pseudomembranous colitis May 2024.  Risk of recurrent C. difficile colitis with use of broad-spectrum antibiotics    Recommendations:    Monitor off antibiotics  Obtain blood cultures  Agree with plans for transesophageal echocardiogram to further evaluate aortic vegetation  Obtain CT chest/abdomen/pelvis with contrast to evaluate for progression of malignancy/metastatic disease  Follow-up GI recommendations    Thank you for consultation request. Above plan was    Chemistry Recent Labs     07/29/24  0350 07/30/24  0349   GLUCOSE 130* 94    139   K 3.0* 3.5    107   CO2 25 23   BUN 15 16   CREATININE 1.32* 1.29   GLOB 3.8 3.9   ALT 10* 8*   AST 10 12      CBC w/Diff Recent Labs     07/29/24  0350 07/29/24  1825 07/29/24  2308 07/30/24  0349   WBC 8.5  --   --  8.0   RBC 3.04*  --   --  3.02*   HGB 7.9* 8.6* 8.4* 7.6*  7.7*   HCT 25.7* 28.5* 27.7* 25.2*  26.0*     --   --  232      Microbiology Results       Procedure Component Value Units Date/Time    Culture, Blood 1 [[id number]]     Order Status: Sent Specimen: Blood     Culture, Blood 2 [[id number]]     Order Status: Sent Specimen: Blood     COVID-19, Rapid [[id number]] Collected: 07/26/24 2055    Order Status: Completed Specimen: Nasopharyngeal Updated: 07/26/24 2116     Source Nasopharyngeal        SARS-CoV-2, Rapid Not detected        Comment: Rapid Abbott ID Now     Rapid NAAT:  The specimen is NEGATIVE for SARS-CoV-2, the novel coronavirus associated with COVID-19.     Negative results should be treated as presumptive and, if inconsistent with clinical signs and symptoms or necessary for patient management, should be tested with an alternative molecular assay.  Negative results do not preclude SARS-CoV-2 infection and should not be used as the sole basis for patient management decisions.     This test has been authorized by the FDA under an Emergency Use Authorization (EUA) for use by authorized laboratories.   Fact sheet for Healthcare Providers:  https://www.fda.gov/media/401182/download  Fact sheet for Patients: https://www.fda.gov/media/243130/download     Methodology: Isothermal Nucleic Acid Amplification         Rapid influenza A/B antigens [[id number]] Collected: 07/26/24 2055    Order Status: Completed Specimen: Nasal Washing Updated: 07/26/24 2115     Influenza A Ag Negative        Comment: A negative result does not exclude influenza virus infection, seasonal or H1N1 due to suboptimal

## 2024-07-30 NOTE — PLAN OF CARE
Problem: Safety - Adult  Goal: Free from fall injury  7/30/2024 0425 by Rubin Watkins RN  Outcome: Progressing  7/29/2024 1624 by Leslie Acosta RN  Outcome: Progressing  Flowsheets (Taken 7/29/2024 1623)  Free From Fall Injury:   Based on caregiver fall risk screen, instruct family/caregiver to ask for assistance with transferring infant if caregiver noted to have fall risk factors   Instruct family/caregiver on patient safety     Problem: Pain  Goal: Verbalizes/displays adequate comfort level or baseline comfort level  7/30/2024 0425 by Rubin Watkins RN  Outcome: Progressing  7/29/2024 1624 by Leslie Acosta RN  Outcome: Progressing  Flowsheets  Taken 7/29/2024 1521 by Bennett Sims RN  Verbalizes/displays adequate comfort level or baseline comfort level: Assess pain using appropriate pain scale  Taken 7/29/2024 1511 by Bennett Sims RN  Verbalizes/displays adequate comfort level or baseline comfort level: Assess pain using appropriate pain scale  Taken 7/29/2024 1501 by Bennett Sims RN  Verbalizes/displays adequate comfort level or baseline comfort level: Assess pain using appropriate pain scale     Problem: Discharge Planning  Goal: Discharge to home or other facility with appropriate resources  7/30/2024 0425 by Rubin Watkins RN  Outcome: Progressing  Flowsheets (Taken 7/29/2024 1929)  Discharge to home or other facility with appropriate resources:   Identify barriers to discharge with patient and caregiver   Arrange for needed discharge resources and transportation as appropriate   Identify discharge learning needs (meds, wound care, etc)   Refer to discharge planning if patient needs post-hospital services based on physician order or complex needs related to functional status, cognitive ability or social support system  7/29/2024 1624 by Leslie Acosta, RN  Outcome: Progressing  Flowsheets (Taken 7/29/2024 0853)  Discharge to home or other facility with appropriate resources:   Identify

## 2024-07-30 NOTE — PLAN OF CARE
Problem: Physical Therapy - Adult  Goal: By Discharge: Performs mobility at highest level of function for planned discharge setting.  See evaluation for individualized goals.  Description: Physical Therapy Goals:  Initiated 7/30/2024 to be met within 7-10 days.    1.  Patient will move from supine to sit and sit to supine , scoot up and down, and roll side to side in bed with supervision/set-up in order to safely get into/out of bed.    2.  Patient will transfer from bed to chair and chair to bed with supervision/set-up using RW in order to safely partake in OOB mobility.  3.  Patient will perform sit to stand with supervision/set-up in order to safely partake in OOB mobility.  4.  Patient will ambulate with supervision/set-up for 50 feet using RW in order to safely navigate household distances.   5.  Patient will ascend/descend 2 stairs with B handrail(s) with supervision/set-up in order to safely enter/exit home environment.    PLOF: lives with wife in 1 level home with 2 BRITTNI (B handrails), independent at baseline using rollator      Outcome: Progressing     PHYSICAL THERAPY EVALUATION    Patient: Max Aly (74 y.o. male)  Date: 7/30/2024  Primary Diagnosis: GI bleed [K92.2]  Iron deficiency anemia due to chronic blood loss [D50.0]  Lower GI bleed [K92.2]  Acute exacerbation of chronic obstructive pulmonary disease (COPD) (HCC) [J44.1]  Procedure(s) (LRB):  ESOPHAGOGASTRODUODENOSCOPY (N/A) 1 Day Post-Op   Precautions: Fall Risk, General Precautions,  ,  ,  ,  ,  ,  ,      ASSESSMENT :  Patient seen for PT evaluation and treatment. Received supine; agreeable. Presenting with deficits as listed below, most notably impacted by reduced activity tolerance. Able to complete bed mobility with CGA, transfers with CGA, and short distance ambulation using RW with CGA-Randy. Demonstrates increased work of breathing and requires extended rest break to recover. HR at rest 87 and up to 116 with short gait trial. SpO2 85%  be considered in conjunction with interdisciplinary team recommendations to determine the most appropriate discharge setting. Patient's social support, diagnosis, medical stability, and prior level of function should also be taken into consideration.     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    Hyperlipidemia 05/04/2023    Hypertension 05/04/2023    ASHLEY on CPAP 05/04/2023    Paroxysmal atrial fibrillation (HCC) 05/04/2023     Past Surgical History:   Procedure Laterality Date    IR IVC FILTER PLACEMENT W IMAGING  5/5/2023    IR IVC FILTER PLACEMENT W IMAGING 5/5/2023 Bob Castañeda MD [facility] ANGIO IR    SCROTAL SURGERY Left        Home Situation:  Social/Functional History  Lives With: Spouse  Type of Home: House  Home Layout: One level  Home Access: Stairs to enter with rails  Entrance Stairs - Number of Steps: 2  Entrance Stairs - Rails: Both  Bathroom Shower/Tub: Walk-in shower  Bathroom Toilet: Standard  Bathroom Equipment: Grab bars in shower  Bathroom Accessibility: Accessible  Home Equipment: Walker - Rolling, Cane, Rollator  Has the patient had two or more falls in the past year or any fall with injury in the past year?: No  Receives Help From: Family  ADL Assistance: Independent  Homemaking Assistance: Independent  Homemaking Responsibilities: Yes  Ambulation Assistance: Independent  Transfer Assistance: Independent  Active : Yes  Mode of Transportation: Car  Education: Not Applicable  Occupation: Retired  Type of Occupation: Not Appicable  Critical Behavior:  Orientation  Overall Orientation Status: Within Normal Limits  Orientation Level: Oriented X4       Strength:    Strength: Generally

## 2024-07-30 NOTE — PLAN OF CARE
decreased cardiac output     Problem: Skin/Tissue Integrity - Adult  Goal: Skin integrity remains intact  7/30/2024 1232 by Vanesa Pena RN  Outcome: Progressing  Flowsheets  Taken 7/30/2024 1230  Skin Integrity Remains Intact:   Monitor for areas of redness and/or skin breakdown   Every 4-6 hours minimum: Change oxygen saturation probe site  Taken 7/30/2024 0932  Skin Integrity Remains Intact:   Monitor for areas of redness and/or skin breakdown   Every 4-6 hours minimum: Change oxygen saturation probe site  7/30/2024 0425 by Rubin Watkins RN  Outcome: Progressing  Flowsheets (Taken 7/29/2024 1929)  Skin Integrity Remains Intact:   Monitor for areas of redness and/or skin breakdown   Assess vascular access sites hourly  Goal: Incisions, wounds, or drain sites healing without S/S of infection  7/30/2024 1232 by Vanesa Pena RN  Outcome: Progressing  Flowsheets  Taken 7/30/2024 1230  Incisions, Wounds, or Drain Sites Healing Without Sign and Symptoms of Infection:   ADMISSION and DAILY: Assess and document risk factors for pressure ulcer development   TWICE DAILY: Assess and document dressing/incision, wound bed, drain sites and surrounding tissue  Taken 7/30/2024 0932  Incisions, Wounds, or Drain Sites Healing Without Sign and Symptoms of Infection: TWICE DAILY: Assess and document skin integrity  7/30/2024 0425 by Rubin Watkins RN  Outcome: Progressing  Flowsheets (Taken 7/29/2024 1929)  Incisions, Wounds, or Drain Sites Healing Without Sign and Symptoms of Infection: TWICE DAILY: Assess and document skin integrity  Goal: Oral mucous membranes remain intact  7/30/2024 1232 by Vanesa Pena, RN  Outcome: Progressing  Flowsheets  Taken 7/30/2024 1230  Oral Mucous Membranes Remain Intact: Assess oral mucosa and hygiene practices  Taken 7/30/2024 0932  Oral Mucous Membranes Remain Intact:   Assess oral mucosa and hygiene practices   Implement preventative oral hygiene regimen  7/30/2024 0425 by  and prevent exacerbation or deterioration   Update acute care plan with appropriate goals if chronic or comorbid symptoms are exacerbated and prevent overall improvement and discharge  7/30/2024 0425 by Rubin Watkins, RN  Outcome: Progressing  Flowsheets (Taken 7/29/2024 1929)  Care Plan - Patient's Chronic Conditions and Co-Morbidity Symptoms are Monitored and Maintained or Improved:   Monitor and assess patient's chronic conditions and comorbid symptoms for stability, deterioration, or improvement   Collaborate with multidisciplinary team to address chronic and comorbid conditions and prevent exacerbation or deterioration   Update acute care plan with appropriate goals if chronic or comorbid symptoms are exacerbated and prevent overall improvement and discharge

## 2024-07-30 NOTE — PLAN OF CARE
Problem: Occupational Therapy - Adult  Goal: By Discharge: Performs self-care activities at highest level of function for planned discharge setting.  See evaluation for individualized goals.  Description: Occupational Therapy Goals:  Initiated 7/30/2024 to be met within 7-10 days.    1.  Patient will perform grooming with modified independence standing at sink >3 minutes.   2.  Patient will perform bathing with modified independence.  3.  Patient will perform lower body dressing  with modified independence.  4.  Patient will perform toilet transfers with modified independence.  5.  Patient will perform all aspects of toileting with modified independence.  6.  Patient will participate in upper extremity therapeutic exercise/activities with modified independence for 8-10 minutes to increase strength/endurance for ADLs.    7.  Patient will utilize energy conservation techniques during functional activities with verbal and visual cues.    PLOF: Pt lives in a 1 story home with his wife, previously Lenin in all ADLs.     Outcome: Progressing    OCCUPATIONAL THERAPY EVALUATION    Patient: Max Aly (74 y.o. male)  Date: 7/30/2024  Primary Diagnosis: GI bleed [K92.2]  Iron deficiency anemia due to chronic blood loss [D50.0]  Lower GI bleed [K92.2]  Acute exacerbation of chronic obstructive pulmonary disease (COPD) ([facility]) [J44.1]  Procedure(s) (LRB):  ESOPHAGOGASTRODUODENOSCOPY (N/A) 1 Day Post-Op   Precautions: Fall Risk, General Precautions    ASSESSMENT :  Pt cleared for OT eval and pt agreeable to participate. Pt CGA for bed mobility to sit EOB. MaxA to don socks, however, pt reports that he rarely wears socks at home. CGA for STS txr and pt reports needing to use the restroom. CGA for functional mobility with RW. CGA for toilet txr to sit, CGA/Sup for gaetano-hygiene. CGA/Randy to txr off toilet. Stood at sink <1 minute for hand hygiene and was notably SOB. CGA to get to recliner chair and pt's O2 was at 87% on room  understanding    Thank you for this referral.  Roseanna Cardenas, OT  Minutes: 20    Eval Complexity: Decision Making: Medium Complexity

## 2024-07-30 NOTE — PROGRESS NOTES
[email or web address]  [phone]    Gastroenterology Progress Note    Impression:  Chronic anemia - stable H/H during admission, at baseline as well.  7/29 EGD was wnl. Patient has been on Eliquis for atrial fibrillation and pulmonary embolus.  KIRIT on CKDs3  Acute on chronic CHF  HTN  HLD  Type 2 DM  Paroxysmal atrial fibr on Eliquis  History of sick sinus syndrome s/p pacemaker  ASHLEY  Recent episode of colitis   - 5/2024 [facility] admission - colo performed 5/10/24 for hematochezia revealing suspected pseudomembranous colitis, cecal polyps, and diverticulosis; path revealed moderately severe, acute colitis. Stool studies were neg for C diff x 2. Treated w/ IV steroids for suspected UC. Was seen by [facility] as OP 7/12 w/ no reports of diarrhea or rectal bleeding since hospital d/c despite restarting eliquis and steroid cessation - planned for labs and 6wk f/u.       Plan:  1. Resume Eliquis today. Regular diet. IV PPI BID. F/U w/ [facility] for additional w/u PRN.   2. IV/PO PPI QD.   3. Monitor H/H, transfuse if Hgb <7.  4. Continue medical management per primary.     Thank you for this consultation. We will not actively follow, but remain available for questions.      Chief Complaint: anemia      Subjective:  doing well this morning, tolerating regular diet. No GI complaints.     ROS: Denies any fevers, chills, nausea, vomiting, diarrhea, or hematochezia.       General: well nourished, no acute distress  Eyes: conjunctiva normal, EOM normal  Pulmonary: breath effort normal    Patient Active Problem List   Diagnosis    Bilateral pulmonary embolism (HCC)    Hypertension    Hyperlipidemia    Type 2 diabetes mellitus with stage 3a chronic kidney disease, without long-term current use of insulin (HCC)    ASHLEY on CPAP    Hepatitis C    CKD (chronic kidney disease)    Acute on chronic blood loss anemia    Acute kidney injury superimposed on chronic kidney disease (HCC)    History of Melody's gangrene    Benign  prostatic hyperplasia without lower urinary tract symptoms    Paroxysmal atrial fibrillation (HCC)    Diverticulitis    Encounter for palliative care    Advanced care planning/counseling discussion    Anemia of chronic disease    Debility    Melody's gangrene of scrotum    Chronic atrial fibrillation (HCC)    Diverticulitis of colon    Gastrointestinal bleeding    Stage 3a chronic kidney disease (HCC)    Hypokalemia    Hypomagnesemia    History of venous thromboembolism    Acute on chronic diastolic congestive heart failure (HCC)    H/O sick sinus syndrome         BP (!) 113/55   Pulse 70   Temp 98.9 °F (37.2 °C) (Oral)   Resp 18   Ht 1.778 m (5' 10\")   Wt 131.1 kg (289 lb)   SpO2 97%   BMI 41.47 kg/m²         Intake/Output Summary (Last 24 hours) at 7/30/2024 0852  Last data filed at 7/30/2024 0741  Gross per 24 hour   Intake 1080.43 ml   Output 3450 ml   Net -2369.57 ml       CBC w/Diff    Lab Results   Component Value Date/Time    WBC 8.0 07/30/2024 03:49 AM    RBC 3.02 (L) 07/30/2024 03:49 AM    HGB 7.6 (L) 07/30/2024 03:49 AM    HGB 7.7 (L) 07/30/2024 03:49 AM    HCT 25.2 (L) 07/30/2024 03:49 AM    HCT 26.0 (L) 07/30/2024 03:49 AM    MCV 86.1 07/30/2024 03:49 AM    MCH 25.5 07/30/2024 03:49 AM    MCHC 29.6 (L) 07/30/2024 03:49 AM    RDW 17.1 (H) 07/30/2024 03:49 AM     07/30/2024 03:49 AM    MPV 11.4 07/30/2024 03:49 AM    No results found for: \"MONO\", \"BANDS\", \"BASOS\", \"METAS\", \"PRO\"   Basic Metabolic Profile   Recent Labs     07/30/24  0349      K 3.5      CO2 23   BUN 16   GLUCOSE 94   MG 1.9        Hepatic Function    No results found for: \"TP\" No components found for: \"SGOT\", \"GPT\", \"DBILI\", \"TBIL\"       Coags   Recent Labs     07/28/24  0735   INR 1.3*   APTT 36.0               Fredi Diehl PA-C.   07/30/24, 11:15 AM   [facility]  [email or web address]  Phone: [phone]  Pager: [phone]

## 2024-07-30 NOTE — PROGRESS NOTES
[facility]  Progress Note    Patient: Max Aly Age: 74 y.o. : 1950 MR#: [id number] SSN: [id number]  Date: 2024                 DVT Prophylaxis:  []Lovenox  []Hep SQ  [x]SCDs  []Coumadin   []On Heparin gtt []PO anticoagulant    Anticipated discharge: 2024 to home, pending clinical course    Subjective:     The patient was seen and examined at the bedside in follow-up for gastrointestinal bleeding, CHF exacerbation and acute on chronic anemia among other issues.  His wife and other family members were also in the room.  Patient was sitting in a chair.  He said that he feels okay overall.  He denied any shortness of breath, chest pain or palpitations.    Results of the 2D echo were discussed with him and with his family members.      Objective:   VS: BP (!) 106/55   Pulse 81   Temp 99.2 °F (37.3 °C) (Oral)   Resp 18   Ht 1.778 m (5' 10\")   Wt 131.1 kg (289 lb)   SpO2 97%   BMI 41.47 kg/m²    Tmax/24hrs: Temp (24hrs), Av.6 °F (37 °C), Min:97.8 °F (36.6 °C), Max:99.2 °F (37.3 °C)    Intake/Output Summary (Last 24 hours) at 2024 1250  Last data filed at 2024 0954  Gross per 24 hour   Intake 1200.43 ml   Output 3450 ml   Net -2249.57 ml        PHYSICAL EXAM  General Appearance: NAD, conversant; obese appearing  HENT: normocephalic/atraumatic, moist mucus membranes  Neck: No JVD, supple  Lungs: Faint inspiratory rhonchi auscultated anteriorly; breath sounds are diminished  CV: RRR, systolic murmur auscultated in the second intercostal space at the sternal border  Abdomen: soft, non-tender, normal bowel sounds  Extremities: no cyanosis, 2+ bilateral lower extremity pitting edema present  Neuro: No focal deficits, motor/sensory intact  Skin: Normal color, intact  Psych: appropriate affect, alert and oriented to person, place and time    Current Facility-Administered Medications   Medication Dose Route Frequency    potassium chloride  vegetation  2D echo done today showed a small to medium sized vegetation on the aortic valve, accompanied by severe aortic regurgitation  2 sets of blood cultures have been ordered  ID consulted-await recommendations  Keep the patient n.p.o. after midnight for possible GRIFFIN tomorrow  Cardiology input appreciated-discussed with cardiology    4.  Severe aortic regurgitation  Could have been secondary to aortic valve vegetation, although unclear  Patient may benefit from aortic valve replacement since he has severe AR and LV dilation.  At this time, the patient does not have any clinical evidence of respiratory compromise..  Continue medical management with diuretics   Await further recommendations from cardiology    4.  Heart failure with preserved ejection fraction  2D echo revealed a normal EF with LV dilation and severe AR.  Patient may have acute on chronic heart failure with preserved ejection fraction  Continue IV furosemide for diuresis.  Monitor intake and output.  Measure weight on a daily basis.  Continue low-sodium diet and 1500 cc fluid restriction  Cardiology input appreciated-discussed with cardiology    5.  Stage III chronic kidney disease  Renal function stable; continue to monitor    6.  History of venous thromboembolism  Patient was found to have a submassive PE, and right lower extremity DVT in May 2023.  He underwent placement of an IVC filter during that hospitalization.  Hold apixaban because of blood loss anemia    7.  Type 2 diabetes mellitus  Hold oral antidiabetic medications in the hospital.  Continue Lantus and correction dose insulin.  Continue carbohydrate controlled diet    8.  Hyperlipidemia  Continue atorvastatin      9.  Benign prostatic hyperplasia  Continue tamsulosin and finasteride    10.  History of sick sinus syndrome  S/p permanent pacemaker placement    11. Hypertension  Continue diltiazem.  Hold hydrochlorothiazide as the patient is at risk of KIRIT.    .Please contact

## 2024-07-30 NOTE — PROGRESS NOTES
[facility] - Progress Note    Admit Date: 7/26/2024  Attending Cardiologist: Dr. Pendleton    Assessment:     -Anemia, GI bleed.  Hx colitis.  S/p colonoscopy 5/10/2024 - inadequate colon preparation with stool in the entire examined colon, however showed diffuse severe mucosal changes in rectum, sigmoid colon, descending colon, transverse colon and proximal ascending colon secondary to colitis, some of the areas look like ?pseudomembranous colitis, biopsied; diverticulosis in sigmoid colon; one 4 mm polyp in the cecum that was biopsied  S/p EGD 7/29/2024 - normal, no evidence of active or recent hemorrhage   -Chronic peripheral edema, uses support stockings, not on maintenance diuretics  -Echo 5/14/2024 ([facility]): LVEF 50-55%, no significant valvular stenosis or regurgitation within limits of study  -Sick sinus syndrome, S/p PPM 05/2017, s/p gen change 12/2020 ([facility]) with chronic intermittent sensing of atrial lead noise  -Hx pulmonary embolism, 08/2020, 05/2024, s/p IVC filter, on Eliquis  -Hx acute occlusive superficial vein thrombosis in LUE cephalic vein upper arm  -R upper renal mass, followed by [facility]  -Hx Melody's gangrene, s/p extensive scrotal debridement  -Hx BPH, hematuria, retentions, s/p cystoscopy, clot evacuation, limited TURP  -HTN, on Losartan, Diltiazem; Hx cough with ACEi  -DMII  -HLD  -ASHLEY, uses CPAP  -Obesity     Primary cardiologist is Dr. Hunt at [facility]     Plan:     -Continue IV Lasix, diuresing well, renal indices stable.  -Echo pending, will review results as able.  -S/p EGD by GI team, appreciate assistance.  -Continue Cardizem.  -Anticoagulation remains on hold per primary team due to anemia.    Subjective:     No new complaints. Denies pain.      Objective:      Patient Vitals for the past 8 hrs:   Temp Pulse Resp BP SpO2   07/30/24 0812 -- -- -- (!) 113/55 --   07/30/24 0741 98.9 °F (37.2 °C) 70 18 (!) 113/55 --   07/30/24 0500 -- 61 -- -- --    07/30/24 0418 97.8 °F (36.6 °C) 61 19 (!) 107/51 97 %         Patient Vitals for the past 96 hrs:   Weight   07/30/24 0812 131.1 kg (289 lb)   07/27/24 0445 131.2 kg (289 lb 4.8 oz)   07/26/24 2041 127.4 kg (280 lb 12.8 oz)              Current Facility-Administered Medications   Medication Dose Route Frequency    potassium chloride (KLOR-CON M) extended release tablet 40 mEq  40 mEq Oral PRN    Or    potassium bicarb-citric acid (EFFER-K) effervescent tablet 40 mEq  40 mEq Oral PRN    Or    potassium chloride 10 mEq/100 mL IVPB (Peripheral Line)  10 mEq IntraVENous PRN    magnesium sulfate 2000 mg in 50 mL IVPB premix  2,000 mg IntraVENous PRN    sodium phosphate 15 mmol in sodium chloride 0.9 % 250 mL IVPB  15 mmol IntraVENous PRN    perflutren lipid microspheres (DEFINITY) injection 2 mL  2 mL IntraVENous ONCE PRN    furosemide (LASIX) injection 40 mg  40 mg IntraVENous BID    guaiFENesin-dextromethorphan (ROBITUSSIN DM) 100-10 MG/5ML syrup 5 mL  5 mL Oral Q4H PRN    sodium chloride flush 0.9 % injection 5-40 mL  5-40 mL IntraVENous 2 times per day    sodium chloride flush 0.9 % injection 5-40 mL  5-40 mL IntraVENous PRN    0.9 % sodium chloride infusion   IntraVENous PRN    ondansetron (ZOFRAN-ODT) disintegrating tablet 4 mg  4 mg Oral Q8H PRN    Or    ondansetron (ZOFRAN) injection 4 mg  4 mg IntraVENous Q6H PRN    acetaminophen (TYLENOL) tablet 650 mg  650 mg Oral Q6H PRN    Or    acetaminophen (TYLENOL) suppository 650 mg  650 mg Rectal Q6H PRN    pantoprazole (PROTONIX) 40 mg in sodium chloride (PF) 0.9 % 10 mL injection  40 mg IntraVENous Q12H    insulin lispro (HUMALOG,ADMELOG) injection vial 0-8 Units  0-8 Units SubCUTAneous TID WC    insulin lispro (HUMALOG,ADMELOG) injection vial 0-4 Units  0-4 Units SubCUTAneous Nightly    glucose chewable tablet 16 g  4 tablet Oral PRN    dextrose bolus 10% 125 mL  125 mL IntraVENous PRN    Or    dextrose bolus 10% 250 mL  250 mL IntraVENous PRN    glucagon (rDNA)  injection 1 mg  1 mg SubCUTAneous PRN    dextrose 10 % infusion   IntraVENous Continuous PRN    mupirocin (BACTROBAN) 2 % ointment   Topical TID    vitamin D (ERGOCALCIFEROL) capsule 50,000 Units  50,000 Units Oral Weekly    traMADol (ULTRAM) tablet 25 mg  25 mg Oral TID PRN    tamsulosin (FLOMAX) capsule 0.4 mg  0.4 mg Oral Daily    fluticasone (FLONASE) 50 MCG/ACT nasal spray 1 spray  1 spray Each Nostril Daily    finasteride (PROSCAR) tablet 5 mg  5 mg Oral Daily    cetirizine (ZYRTEC) tablet 10 mg  10 mg Oral Daily    dilTIAZem (CARDIZEM CD) extended release capsule 120 mg  120 mg Oral Daily    atorvastatin (LIPITOR) tablet 40 mg  40 mg Oral Daily    0.9 % sodium chloride infusion   IntraVENous PRN    insulin glargine (LANTUS) injection vial 20 Units  20 Units SubCUTAneous QHS         Intake/Output Summary (Last 24 hours) at 7/30/2024 0915  Last data filed at 7/30/2024 0741  Gross per 24 hour   Intake 1080.43 ml   Output 3450 ml   Net -2369.57 ml       Physical Exam:  General:  alert, appears stated age, cooperative, and no distress  Neck:  supple  Lungs:  clear to auscultation bilaterally  Heart:  regular rate and rhythm  Abdomen:  abdomen is soft without significant tenderness, masses, organomegaly or guarding  Extremities:  atraumatic, improving edema    Visit Vitals  BP (!) 113/55   Pulse 70   Temp 98.9 °F (37.2 °C) (Oral)   Resp 18   Ht 1.778 m (5' 10\")   Wt 131.1 kg (289 lb)   SpO2 97%   BMI 41.47 kg/m²       Data Review:     Labs: Results:       Chemistry Recent Labs     07/28/24  0735 07/29/24  0350 07/30/24  0349   NA  --  140 139   K  --  3.0* 3.5   CL  --  109 107   CO2  --  25 23   BUN  --  15 16   CREATININE  --  1.32* 1.29   MG 1.5* 1.3* 1.9   GLOB  --  3.8 3.9      CBC w/Diff Recent Labs     07/29/24  0350 07/29/24  1825 07/29/24  2308 07/30/24  0349   WBC 8.5  --   --  8.0   RBC 3.04*  --   --  3.02*   HGB 7.9* 8.6* 8.4* 7.6*  7.7*   HCT 25.7* 28.5* 27.7* 25.2*  26.0*     --   --  232

## 2024-07-31 ENCOUNTER — ANESTHESIA (OUTPATIENT)
Facility: HOSPITAL | Age: 74
End: 2024-07-31
Payer: MEDICARE

## 2024-07-31 ENCOUNTER — HOSPITAL ENCOUNTER (OUTPATIENT)
Facility: HOSPITAL | Age: 74
Discharge: HOME | End: 2024-08-02
Attending: INTERNAL MEDICINE
Payer: MEDICARE

## 2024-07-31 ENCOUNTER — ANESTHESIA EVENT (OUTPATIENT)
Facility: HOSPITAL | Age: 74
End: 2024-07-31
Payer: MEDICARE

## 2024-07-31 VITALS
WEIGHT: 290 LBS | OXYGEN SATURATION: 96 % | RESPIRATION RATE: 18 BRPM | HEART RATE: 64 BPM | DIASTOLIC BLOOD PRESSURE: 47 MMHG | SYSTOLIC BLOOD PRESSURE: 105 MMHG | HEIGHT: 70 IN | TEMPERATURE: 98.4 F | BODY MASS INDEX: 41.52 KG/M2

## 2024-07-31 PROBLEM — B95.2 VRE BACTEREMIA: Status: ACTIVE | Noted: 2024-07-31

## 2024-07-31 PROBLEM — Z16.21 VRE BACTEREMIA: Status: ACTIVE | Noted: 2024-07-31

## 2024-07-31 PROBLEM — R78.81 VRE BACTEREMIA: Status: ACTIVE | Noted: 2024-07-31

## 2024-07-31 LAB
ACCESSION NUMBER, LLC1M: ABNORMAL
ACINETOBACTER CALCOAC BAUMANNII COMPLEX BY PCR: NOT DETECTED
ANION GAP SERPL CALC-SCNC: 8 MMOL/L (ref 3–18)
B FRAGILIS DNA BLD POS QL NAA+NON-PROBE: NOT DETECTED
BASOPHILS # BLD: 0 K/UL (ref 0–0.1)
BASOPHILS NFR BLD: 0 % (ref 0–2)
BIOFIRE TEST COMMENT: ABNORMAL
BUN SERPL-MCNC: 23 MG/DL (ref 7–18)
BUN/CREAT SERPL: 15 (ref 12–20)
C ALBICANS DNA BLD POS QL NAA+NON-PROBE: NOT DETECTED
C AURIS DNA BLD POS QL NAA+NON-PROBE: NOT DETECTED
C GATTII+NEOFOR DNA BLD POS QL NAA+N-PRB: NOT DETECTED
C GLABRATA DNA BLD POS QL NAA+NON-PROBE: NOT DETECTED
C KRUSEI DNA BLD POS QL NAA+NON-PROBE: NOT DETECTED
C PARAP DNA BLD POS QL NAA+NON-PROBE: NOT DETECTED
C TROPICLS DNA BLD POS QL NAA+NON-PROBE: NOT DETECTED
CALCIUM SERPL-MCNC: 8.5 MG/DL (ref 8.5–10.1)
CHLORIDE SERPL-SCNC: 108 MMOL/L (ref 100–111)
CK SERPL-CCNC: 25 U/L (ref 39–308)
CO2 SERPL-SCNC: 23 MMOL/L (ref 21–32)
CREAT SERPL-MCNC: 1.55 MG/DL (ref 0.6–1.3)
DIFFERENTIAL METHOD BLD: ABNORMAL
E CLOAC COMP DNA BLD POS NAA+NON-PROBE: NOT DETECTED
E COLI DNA BLD POS QL NAA+NON-PROBE: NOT DETECTED
E FAECALIS DNA BLD POS QL NAA+NON-PROBE: DETECTED
E FAECIUM DNA BLD POS QL NAA+NON-PROBE: NOT DETECTED
ECHO AR MAX VEL PISA: 4 M/S
ECHO AV REGURGITANT PHT: 208.3 MILLISECOND
ECHO AV VENA CONTRACTA AREA: 0.6 CM2
ECHO AV VENA CONTRACTA: 0.5 CM
ECHO BSA: 2.55 M2
ECHO LV INTERNAL DIMENSION DIASTOLE INDEX: 2.46 CM/M2
ECHO LV INTERNAL DIMENSION DIASTOLIC: 6 CM (ref 4.2–5.9)
ENTEROBACTERALES DNA BLD POS NAA+N-PRB: NOT DETECTED
EOSINOPHIL # BLD: 0.1 K/UL (ref 0–0.4)
EOSINOPHIL NFR BLD: 2 % (ref 0–5)
ERYTHROCYTE [DISTWIDTH] IN BLOOD BY AUTOMATED COUNT: 17.2 % (ref 11.6–14.5)
GLUCOSE BLD STRIP.AUTO-MCNC: 142 MG/DL (ref 70–110)
GLUCOSE BLD STRIP.AUTO-MCNC: 158 MG/DL (ref 70–110)
GLUCOSE BLD STRIP.AUTO-MCNC: 202 MG/DL (ref 70–110)
GLUCOSE BLD STRIP.AUTO-MCNC: 219 MG/DL (ref 70–110)
GLUCOSE SERPL-MCNC: 150 MG/DL (ref 74–99)
GP B STREP DNA BLD POS QL NAA+NON-PROBE: NOT DETECTED
HAEM INFLU DNA BLD POS QL NAA+NON-PROBE: NOT DETECTED
HCT VFR BLD AUTO: 24.4 % (ref 36–48)
HCT VFR BLD AUTO: 26.4 % (ref 36–48)
HGB BLD-MCNC: 7.4 G/DL (ref 13–16)
HGB BLD-MCNC: 7.7 G/DL (ref 13–16)
IMM GRANULOCYTES # BLD AUTO: 0.1 K/UL (ref 0–0.04)
IMM GRANULOCYTES NFR BLD AUTO: 1 % (ref 0–0.5)
K OXYTOCA DNA BLD POS QL NAA+NON-PROBE: NOT DETECTED
KLEBSIELLA SP DNA BLD POS QL NAA+NON-PRB: NOT DETECTED
KLEBSIELLA SP DNA BLD POS QL NAA+NON-PRB: NOT DETECTED
L MONOCYTOG DNA BLD POS QL NAA+NON-PROBE: NOT DETECTED
LYMPHOCYTES # BLD: 0.5 K/UL (ref 0.9–3.6)
LYMPHOCYTES NFR BLD: 8 % (ref 21–52)
MCH RBC QN AUTO: 26.1 PG (ref 24–34)
MCHC RBC AUTO-ENTMCNC: 30.3 G/DL (ref 31–37)
MCV RBC AUTO: 85.9 FL (ref 78–100)
MONOCYTES # BLD: 0.5 K/UL (ref 0.05–1.2)
MONOCYTES NFR BLD: 7 % (ref 3–10)
N MEN DNA BLD POS QL NAA+NON-PROBE: NOT DETECTED
NEUTS SEG # BLD: 5.2 K/UL (ref 1.8–8)
NEUTS SEG NFR BLD: 81 % (ref 40–73)
NRBC # BLD: 0 K/UL (ref 0–0.01)
NRBC BLD-RTO: 0 PER 100 WBC
P AERUGINOSA DNA BLD POS NAA+NON-PROBE: NOT DETECTED
PLATELET # BLD AUTO: 218 K/UL (ref 135–420)
PMV BLD AUTO: 12 FL (ref 9.2–11.8)
POTASSIUM SERPL-SCNC: 3.3 MMOL/L (ref 3.5–5.5)
PROTEUS SP DNA BLD POS QL NAA+NON-PROBE: NOT DETECTED
RBC # BLD AUTO: 2.84 M/UL (ref 4.35–5.65)
RESISTANT GENE TARGETS: ABNORMAL
S AUREUS DNA BLD POS QL NAA+NON-PROBE: NOT DETECTED
S AUREUS+CONS DNA BLD POS NAA+NON-PROBE: NOT DETECTED
S EPIDERMIDIS DNA BLD POS QL NAA+NON-PRB: NOT DETECTED
S LUGDUNENSIS DNA BLD POS QL NAA+NON-PRB: NOT DETECTED
S MALTOPHILIA DNA BLD POS QL NAA+NON-PRB: NOT DETECTED
S MARCESCENS DNA BLD POS NAA+NON-PROBE: NOT DETECTED
S PNEUM DNA BLD POS QL NAA+NON-PROBE: NOT DETECTED
S PYO DNA BLD POS QL NAA+NON-PROBE: NOT DETECTED
SALMONELLA DNA BLD POS QL NAA+NON-PROBE: NOT DETECTED
SODIUM SERPL-SCNC: 139 MMOL/L (ref 136–145)
STREPTOCOCCUS DNA BLD POS NAA+NON-PROBE: NOT DETECTED
VANA+VANB ISLT/SPM QL: DETECTED
WBC # BLD AUTO: 6.4 K/UL (ref 4.6–13.2)

## 2024-07-31 PROCEDURE — 2700000000 HC OXYGEN THERAPY PER DAY

## 2024-07-31 PROCEDURE — 3700000000 HC ANESTHESIA ATTENDED CARE

## 2024-07-31 PROCEDURE — 6360000002 HC RX W HCPCS: Performed by: INTERNAL MEDICINE

## 2024-07-31 PROCEDURE — 85018 HEMOGLOBIN: CPT

## 2024-07-31 PROCEDURE — 87086 URINE CULTURE/COLONY COUNT: CPT

## 2024-07-31 PROCEDURE — 85014 HEMATOCRIT: CPT

## 2024-07-31 PROCEDURE — 86901 BLOOD TYPING SEROLOGIC RH(D): CPT

## 2024-07-31 PROCEDURE — 86850 RBC ANTIBODY SCREEN: CPT

## 2024-07-31 PROCEDURE — 86923 COMPATIBILITY TEST ELECTRIC: CPT

## 2024-07-31 PROCEDURE — 2500000003 HC RX 250 WO HCPCS: Performed by: INTERNAL MEDICINE

## 2024-07-31 PROCEDURE — 81003 URINALYSIS AUTO W/O SCOPE: CPT

## 2024-07-31 PROCEDURE — 2580000003 HC RX 258: Performed by: INTERNAL MEDICINE

## 2024-07-31 PROCEDURE — 6360000002 HC RX W HCPCS: Performed by: PHYSICIAN ASSISTANT

## 2024-07-31 PROCEDURE — 6370000000 HC RX 637 (ALT 250 FOR IP): Performed by: INTERNAL MEDICINE

## 2024-07-31 PROCEDURE — 99233 SBSQ HOSP IP/OBS HIGH 50: CPT | Performed by: INTERNAL MEDICINE

## 2024-07-31 PROCEDURE — 85025 COMPLETE CBC W/AUTO DIFF WBC: CPT

## 2024-07-31 PROCEDURE — 36415 COLL VENOUS BLD VENIPUNCTURE: CPT

## 2024-07-31 PROCEDURE — 6370000000 HC RX 637 (ALT 250 FOR IP): Performed by: STUDENT IN AN ORGANIZED HEALTH CARE EDUCATION/TRAINING PROGRAM

## 2024-07-31 PROCEDURE — B246ZZ4 ULTRASONOGRAPHY OF RIGHT AND LEFT HEART, TRANSESOPHAGEAL: ICD-10-PCS | Performed by: INTERNAL MEDICINE

## 2024-07-31 PROCEDURE — 86900 BLOOD TYPING SEROLOGIC ABO: CPT

## 2024-07-31 PROCEDURE — 3700000001 HC ADD 15 MINUTES (ANESTHESIA)

## 2024-07-31 PROCEDURE — 6360000002 HC RX W HCPCS: Performed by: NURSE ANESTHETIST, CERTIFIED REGISTERED

## 2024-07-31 PROCEDURE — 1100000000 HC RM PRIVATE

## 2024-07-31 PROCEDURE — 82962 GLUCOSE BLOOD TEST: CPT

## 2024-07-31 PROCEDURE — 93312 ECHO TRANSESOPHAGEAL: CPT

## 2024-07-31 PROCEDURE — 82550 ASSAY OF CK (CPK): CPT

## 2024-07-31 PROCEDURE — 94761 N-INVAS EAR/PLS OXIMETRY MLT: CPT

## 2024-07-31 PROCEDURE — 2500000003 HC RX 250 WO HCPCS: Performed by: NURSE ANESTHETIST, CERTIFIED REGISTERED

## 2024-07-31 PROCEDURE — 80048 BASIC METABOLIC PNL TOTAL CA: CPT

## 2024-07-31 PROCEDURE — 99152 MOD SED SAME PHYS/QHP 5/>YRS: CPT

## 2024-07-31 PROCEDURE — 87040 BLOOD CULTURE FOR BACTERIA: CPT

## 2024-07-31 RX ORDER — PROPOFOL 10 MG/ML
INJECTION, EMULSION INTRAVENOUS PRN
Status: DISCONTINUED | OUTPATIENT
Start: 2024-07-31 | End: 2024-07-31 | Stop reason: SDUPTHER

## 2024-07-31 RX ORDER — ACYCLOVIR 200 MG/1
10 CAPSULE ORAL ONCE
Status: COMPLETED | OUTPATIENT
Start: 2024-07-31 | End: 2024-07-31

## 2024-07-31 RX ORDER — LIDOCAINE HYDROCHLORIDE 20 MG/ML
INJECTION, SOLUTION EPIDURAL; INFILTRATION; INTRACAUDAL; PERINEURAL PRN
Status: DISCONTINUED | OUTPATIENT
Start: 2024-07-31 | End: 2024-07-31 | Stop reason: SDUPTHER

## 2024-07-31 RX ADMIN — SODIUM CHLORIDE, PRESERVATIVE FREE 10 ML: 5 INJECTION INTRAVENOUS at 16:26

## 2024-07-31 RX ADMIN — LIDOCAINE HYDROCHLORIDE 20 MG: 20 INJECTION, SOLUTION EPIDURAL; INFILTRATION; INTRACAUDAL; PERINEURAL at 09:39

## 2024-07-31 RX ADMIN — TAMSULOSIN HYDROCHLORIDE 0.4 MG: 0.4 CAPSULE ORAL at 16:24

## 2024-07-31 RX ADMIN — FUROSEMIDE 40 MG: 10 INJECTION, SOLUTION INTRAMUSCULAR; INTRAVENOUS at 13:47

## 2024-07-31 RX ADMIN — SODIUM CHLORIDE: 9 INJECTION, SOLUTION INTRAVENOUS at 09:17

## 2024-07-31 RX ADMIN — PROPOFOL 50 MG: 10 INJECTION, EMULSION INTRAVENOUS at 09:41

## 2024-07-31 RX ADMIN — SODIUM CHLORIDE, PRESERVATIVE FREE 10 ML: 5 INJECTION INTRAVENOUS at 21:21

## 2024-07-31 RX ADMIN — PROPOFOL 10 MG: 10 INJECTION, EMULSION INTRAVENOUS at 09:55

## 2024-07-31 RX ADMIN — SODIUM CHLORIDE 10 ML: 9 INJECTION INTRAMUSCULAR; INTRAVENOUS; SUBCUTANEOUS at 09:53

## 2024-07-31 RX ADMIN — FLUTICASONE PROPIONATE 1 SPRAY: 50 SPRAY, METERED NASAL at 13:48

## 2024-07-31 RX ADMIN — CETIRIZINE HYDROCHLORIDE 10 MG: 10 TABLET, FILM COATED ORAL at 13:47

## 2024-07-31 RX ADMIN — MUPIROCIN: 20 OINTMENT TOPICAL at 21:22

## 2024-07-31 RX ADMIN — PROPOFOL 50 MG: 10 INJECTION, EMULSION INTRAVENOUS at 09:40

## 2024-07-31 RX ADMIN — SODIUM CHLORIDE, PRESERVATIVE FREE 40 MG: 5 INJECTION INTRAVENOUS at 04:33

## 2024-07-31 RX ADMIN — ATORVASTATIN CALCIUM 40 MG: 40 TABLET, FILM COATED ORAL at 13:47

## 2024-07-31 RX ADMIN — INSULIN LISPRO 2 UNITS: 100 INJECTION, SOLUTION INTRAVENOUS; SUBCUTANEOUS at 19:28

## 2024-07-31 RX ADMIN — PROPOFOL 20 MG: 10 INJECTION, EMULSION INTRAVENOUS at 09:43

## 2024-07-31 RX ADMIN — PROPOFOL 20 MG: 10 INJECTION, EMULSION INTRAVENOUS at 09:53

## 2024-07-31 RX ADMIN — INSULIN GLARGINE 20 UNITS: 100 INJECTION, SOLUTION SUBCUTANEOUS at 21:19

## 2024-07-31 RX ADMIN — MUPIROCIN: 20 OINTMENT TOPICAL at 16:24

## 2024-07-31 RX ADMIN — POTASSIUM CHLORIDE 40 MEQ: 1500 TABLET, EXTENDED RELEASE ORAL at 06:25

## 2024-07-31 RX ADMIN — SODIUM CHLORIDE, PRESERVATIVE FREE 40 MG: 5 INJECTION INTRAVENOUS at 19:48

## 2024-07-31 RX ADMIN — PROPOFOL 20 MG: 10 INJECTION, EMULSION INTRAVENOUS at 09:47

## 2024-07-31 RX ADMIN — FUROSEMIDE 40 MG: 10 INJECTION, SOLUTION INTRAMUSCULAR; INTRAVENOUS at 19:12

## 2024-07-31 RX ADMIN — DAPTOMYCIN 750 MG: 500 INJECTION, POWDER, LYOPHILIZED, FOR SOLUTION INTRAVENOUS at 19:23

## 2024-07-31 RX ADMIN — PROPOFOL 20 MG: 10 INJECTION, EMULSION INTRAVENOUS at 09:45

## 2024-07-31 RX ADMIN — FINASTERIDE 5 MG: 5 TABLET, FILM COATED ORAL at 13:47

## 2024-07-31 RX ADMIN — PROPOFOL 20 MG: 10 INJECTION, EMULSION INTRAVENOUS at 09:50

## 2024-07-31 RX ADMIN — DILTIAZEM HYDROCHLORIDE 120 MG: 120 CAPSULE, EXTENDED RELEASE ORAL at 13:47

## 2024-07-31 ASSESSMENT — PAIN SCALES - GENERAL
PAINLEVEL_OUTOF10: 0

## 2024-07-31 NOTE — ANESTHESIA PRE PROCEDURE
Department of Anesthesiology  Preprocedure Note       Name:  Max Aly   Age:  74 y.o.  :  1950                                          MRN:  [id number]         Date:  2024      Surgeon: * No surgeons listed *    Procedure: * No procedures listed *    Medications prior to admission:   Prior to Admission medications    Medication Sig Start Date End Date Taking? Authorizing Provider   finasteride (PROSCAR) 5 MG tablet Take 1 tablet by mouth daily    Jade York MD   Blood Pressure Monitoring (BLOOD PRESSURE DIGITAL SOLN) KIT  24   Jdae York MD   mupirocin (BACTROBAN) 2 % ointment APPLY TO AFFECTED AREA 3 TIMES A DAY FOR 10 DAYS 24   Jade York MD   amLODIPine (NORVASC) 10 MG tablet Take 1 tablet by mouth daily    Jade York MD   amoxicillin-clavulanate (AUGMENTIN) 875-125 MG per tablet PLEASE SEE ATTACHED FOR DETAILED DIRECTIONS 10/25/23   Jade York MD   vitamin D (ERGOCALCIFEROL) 1.25 MG (58712 UT) CAPS capsule Take 1 capsule by mouth once a week 9/10/23   Jade York MD   glipiZIDE (GLUCOTROL XL) 5 MG extended release tablet Take 2 tablets by mouth daily 23   Jade York MD   metFORMIN (GLUCOPHAGE) 1000 MG tablet 0.5 tablets 16   Provider, Historical, MD   Saw Arthur 450 MG CAPS Take 2 capsules by mouth 2 times daily    Jade York MD   hydroCHLOROthiazide (HYDRODIURIL) 25 MG tablet Take 1 tablet by mouth daily 4/1/10   Jade York MD   ACCU-CHEK GUIDE strip USE TO CHECK BLOOD SUGAR ONCE DAILY 23   Jade York MD   dilTIAZem (CARDIZEM CD) 120 MG extended release capsule Take 1 capsule by mouth daily 23   Anthony Burr MD   fexofenadine (ALLEGRA) 180 MG tablet Take 1 tablet by mouth daily    Jade York MD   atorvastatin (LIPITOR) 40 MG tablet Take 1 tablet by mouth 23   Jade York MD   ELIQUIS 5 MG TABS tablet Take 1 tablet by mouth

## 2024-07-31 NOTE — PROGRESS NOTES
Reviewed CT scan from 7/30/24 with attending Dr. Ojeda. Right lower lobe renal masses are stable and can be managed outpatient by primary urologist, [facility]. This should not interfere with planned Aortic valve replacement surgery. Spoke with Dr. Guerrero and informed him of this.     Terese Coleman, APRN - CNP  [phone] pager  [phone] office

## 2024-07-31 NOTE — PLAN OF CARE
Problem: Safety - Adult  Goal: Free from fall injury  7/30/2024 2111 by Rubin Watkins RN  Outcome: Progressing  7/30/2024 1232 by Vanesa Pena RN  Outcome: Progressing  Flowsheets (Taken 7/30/2024 1227)  Free From Fall Injury:   Instruct family/caregiver on patient safety   Based on caregiver fall risk screen, instruct family/caregiver to ask for assistance with transferring infant if caregiver noted to have fall risk factors     Problem: Pain  Goal: Verbalizes/displays adequate comfort level or baseline comfort level  7/30/2024 2111 by Rubin Watkins RN  Outcome: Progressing  7/30/2024 1232 by Vaneas Pena RN  Outcome: Progressing     Problem: Discharge Planning  Goal: Discharge to home or other facility with appropriate resources  7/30/2024 2111 by Rubin Watkins RN  Outcome: Progressing  7/30/2024 1232 by Vanesa Pena RN  Outcome: Progressing  Flowsheets (Taken 7/30/2024 0932)  Discharge to home or other facility with appropriate resources:   Identify barriers to discharge with patient and caregiver   Arrange for needed discharge resources and transportation as appropriate   Identify discharge learning needs (meds, wound care, etc)   Arrange for interpreters to assist at discharge as needed   Refer to discharge planning if patient needs post-hospital services based on physician order or complex needs related to functional status, cognitive ability or social support system     Problem: Respiratory - Adult  Goal: Achieves optimal ventilation and oxygenation  7/30/2024 2111 by Rubin Watkins RN  Outcome: Progressing  7/30/2024 1232 by Vanesa Pena RN  Outcome: Progressing  Flowsheets (Taken 7/30/2024 0932)  Achieves optimal ventilation and oxygenation:   Assess for changes in respiratory status   Assess and instruct to report shortness of breath or any respiratory difficulty   Assess for changes in mentation and behavior   Oxygen supplementation based on oxygen saturation or  improvement   Collaborate with multidisciplinary team to address chronic and comorbid conditions and prevent exacerbation or deterioration   Update acute care plan with appropriate goals if chronic or comorbid symptoms are exacerbated and prevent overall improvement and discharge     Problem: Physical Therapy - Adult  Goal: By Discharge: Performs mobility at highest level of function for planned discharge setting.  See evaluation for individualized goals.  Description: Physical Therapy Goals:  Initiated 7/30/2024 to be met within 7-10 days.    1.  Patient will move from supine to sit and sit to supine , scoot up and down, and roll side to side in bed with supervision/set-up in order to safely get into/out of bed.    2.  Patient will transfer from bed to chair and chair to bed with supervision/set-up using RW in order to safely partake in OOB mobility.  3.  Patient will perform sit to stand with supervision/set-up in order to safely partake in OOB mobility.  4.  Patient will ambulate with supervision/set-up for 50 feet using RW in order to safely navigate household distances.   5.  Patient will ascend/descend 2 stairs with B handrail(s) with supervision/set-up in order to safely enter/exit home environment.    PLOF: lives with wife in 1 level home with 2 BRITTNI (B handrails), independent at baseline using rollator      7/30/2024 1242 by Dayana Saucedo, PT  Outcome: Progressing     Problem: Occupational Therapy - Adult  Goal: By Discharge: Performs self-care activities at highest level of function for planned discharge setting.  See evaluation for individualized goals.  Description: Occupational Therapy Goals:  Initiated 7/30/2024 to be met within 7-10 days.    1.  Patient will perform grooming with modified independence standing at sink >3 minutes.   2.  Patient will perform bathing with modified independence.  3.  Patient will perform lower body dressing  with modified independence.  4.  Patient will perform toilet

## 2024-07-31 NOTE — ANESTHESIA POSTPROCEDURE EVALUATION
Department of Anesthesiology  Postprocedure Note    Patient: Max Aly  MRN: [id number]  YOB: 1950  Date of evaluation: 7/31/2024    Procedure Summary       Date: 07/31/24 Room / Location: [facility] CARDIAC CATH LAB; [facility] NON-INVASIVE CARDIOLOGY    Anesthesia Start: 0934 Anesthesia Stop: 0958    Procedure: GRIFFIN (PRN CONTRAST/BUBBLE/3D) Diagnosis: Severe aortic regurgitation    Scheduled Providers: Bhavesh Perez MD Responsible Provider: Devan Trivedi MD    Anesthesia Type: MAC ASA Status: 3            Anesthesia Type: MAC    Dixon Phase I: Dixon Score: 10    Dixon Phase II:      Anesthesia Post Evaluation    Patient location during evaluation: bedside  Patient participation: complete - patient participated  Level of consciousness: responsive to verbal stimuli  Airway patency: patent  Nausea & Vomiting: no nausea  Respiratory status: acceptable  Hydration status: euvolemic    No notable events documented.

## 2024-07-31 NOTE — SIGNIFICANT EVENT
INTERIM UPDATE - 1405 EST on 7/31/2024    Nursing Staff calls to report that Patient's Blood Cultures returns positive for growth in Aerobic Bottle with Gram (+) Cocci in Pairs.    Patient is currently on no antibiotics, but is being investigated for Aortic Vegetation.    Ordered CRP, ESR, and Procalcitonin.        INTERIM UPDATE - 4641 EST on 7/31/2024    Later called to report that Blood Cultures also has Gram (+) Cocci in chains.    Patient is now on IV Ceftriaxone and IV Vancomycin.

## 2024-07-31 NOTE — PROGRESS NOTES
Infectious Disease progress Note        Reason: Evaluate for infective endocarditis    Current abx Prior abx         Lines:       Assessment :  80-year-old man with past medical history significant for paroxysmal atrial fibrillation, type 2 diabetes, hepatitis C, BPH, obesity, sleep apnea, C. difficile colitis-positive PCR October 2023 admitted to [facility] on 7/26/2024 for SOB.     Hospitalization at [facility] 05/24/2024-05/31/2024 for hematuria    Evidence of pseudomembranous colitis colonoscopy 5/2024 at [place]. placed prednisone taper per GI    Mass noted on right kidney on ct scan 5/2024  seen by Urology. Suspicion for renal cell carcinoma.    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     Antibiotic management further complicated due to prior history of C. difficile colitis, recent pseudomembranous colitis May 2024.  Risk of recurrent C. 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


Date of Service


DOS:


DATE: 10/26/21 


TIME: 11:03





Chief Complaint


Chief Complaint


Probable infected pacemaker (placed in August at [facility])


Sepsis


Gram-positive bacteremia


History of SVT


History of ablation


Tobacco abuse


Anxiety, Depression, Hypothyroidism





History of Present Illness


History of Present Illness


10/26/2021


Patient seen and examined


Discussed with infectious disease physician


Discussed with cardiology nurse practitioner


Discussed with RN


Chart reviewed


Patient grew 4 out of 4 bottles gram-positive cocci


We are currently treating with Zosyn and Franklyno awaiting further ID input





Vitals/I&O


Vitals/I&O:





                                   Vital Signs








  Date Time  Temp Pulse Resp B/P (MAP) Pulse Ox O2 Delivery O2 Flow Rate FiO2


 


10/26/21 08:20      Room Air  


 


10/26/21 08:05   18  94   


 


10/26/21 07:00 98.3 99  119/62 (81)    





 98.3       














                                    I & O   


 


 10/25/21 10/25/21 10/26/21





 15:00 23:00 07:00


 


Intake Total  0 ml 1200 ml


 


Output Total  150 ml 


 


Balance  -150 ml 1200 ml











Physical Exam


General:  Alert, Oriented X3, Cooperative, No acute distress


Heart:  Regular rate (paced)


Abdomen:  Soft, No tenderness


Extremities:  No cyanosis, No edema


Skin:  Other (left surgical chest incision with some erythema)





Labs


Labs:





Laboratory Tests








Test


 10/25/21


12:32 10/25/21


12:40 10/25/21


16:47 10/26/21


04:30


 


White Blood Count


 8.5 x10^3/uL


(4.0-11.0) 


 


 





 


Red Blood Count


 4.05 x10^6/uL


(3.50-5.40) 


 


 





 


Hemoglobin


 10.6 g/dL


(12.0-15.5) 


 


 





 


Hematocrit


 32.6 %


(36.0-47.0) 


 


 





 


Mean Corpuscular Volume 80 fL ()    


 


Mean Corpuscular Hemoglobin 26 pg (25-35)    


 


Mean Corpuscular Hemoglobin


Concent 33 g/dL


(31-37) 


 


 





 


Red Cell Distribution Width


 21.6 %


(11.5-14.5) 


 


 





 


Platelet Count


 148 x10^3/uL


(140-400) 


 


 





 


Neutrophils (%) (Auto) 94 % (31-73)    


 


Lymphocytes (%) (Auto) 2 % (24-48)    


 


Monocytes (%) (Auto) 2 % (0-9)    


 


Eosinophils (%) (Auto) 1 % (0-3)    


 


Basophils (%) (Auto) 0 % (0-3)    


 


Neutrophils # (Auto)


 8.0 x10^3/uL


(1.8-7.7) 


 


 





 


Lymphocytes # (Auto)


 0.2 x10^3/uL


(1.0-4.8) 


 


 





 


Monocytes # (Auto)


 0.2 x10^3/uL


(0.0-1.1) 


 


 





 


Eosinophils # (Auto)


 0.0 x10^3/uL


(0.0-0.7) 


 


 





 


Basophils # (Auto)


 0.0 x10^3/uL


(0.0-0.2) 


 


 





 


Segmented Neutrophils % 54 % (35-66)    


 


Band Neutrophils % 41 % (0-9)    


 


Lymphocytes % 2 % (24-48)    


 


Monocytes % 1 % (0-10)    


 


Eosinophils % 2 % (0-5)    


 


Platelet Estimate


 Adequate


(ADEQUATE) 


 


 





 


Large Platelets Present    


 


Hypochromasia Mod    


 


Poikilocytosis Mod    


 


Anisocytosis Mod    


 


Microcytosis Mod    


 


Target Cells Occ    


 


Tear Drop Cells Occ    


 


Ovalocytes Few    


 


[place] Cells Few    


 


Crenated Cell     


 


Schistocytes Occ    


 


RBC Morphology Bizarre Forms Present    


 


Sodium Level


 132 mmol/L


(136-145) 


 


 132 mmol/L


(136-145)


 


Potassium Level


 4.3 mmol/L


(3.5-5.1) 


 


 4.2 mmol/L


(3.5-5.1)


 


Chloride Level


 97 mmol/L


() 


 


 99 mmol/L


()


 


Carbon Dioxide Level


 21 mmol/L


(21-32) 


 


 18 mmol/L


(21-32)


 


Anion Gap 14 (6-14)    15 (6-14) 


 


Blood Urea Nitrogen


 27 mg/dL


(7-20) 


 


 29 mg/dL


(7-20)


 


Creatinine


 1.6 mg/dL


(0.6-1.0) 


 


 1.5 mg/dL


(0.6-1.0)


 


Estimated GFR


(Cockcroft-Gault) 35.2 


 


 


 37.9 





 


BUN/Creatinine Ratio 17 (6-20)    19 (6-20) 


 


Glucose Level


 133 mg/dL


(70-99) 


 


 71 mg/dL


(70-99)


 


Lactic Acid Level


 2.6 mmol/L


(0.4-2.0) 


 1.2 mmol/L


(0.4-2.0) 





 


Calcium Level


 8.0 mg/dL


(8.5-10.1) 


 


 7.6 mg/dL


(8.5-10.1)


 


Iron Level


 5 ug/dL


() 


 


 





 


Total Iron Binding Capacity


 303 ug/dL


(250-450) 


 


 





 


Iron Saturation 2 % (15-34)    


 


Total Bilirubin


 1.2 mg/dL


(0.2-1.0) 


 


 1.5 mg/dL


(0.2-1.0)


 


Aspartate Amino Transf


(AST/SGOT) 190 U/L


(15-37) 


 


 302 U/L


(15-37)


 


Alanine Aminotransferase


(ALT/SGPT) 166 U/L


(14-59) 


 


 249 U/L


(14-59)


 


Alkaline Phosphatase


 407 U/L


() 


 


 319 U/L


()


 


Total Protein


 6.1 g/dL


(6.4-8.2) 


 


 6.1 g/dL


(6.4-8.2)


 


Albumin


 2.6 g/dL


(3.4-5.0) 


 


 2.1 g/dL


(3.4-5.0)


 


Albumin/Globulin Ratio 0.7 (1.0-1.7)    0.5 (1.0-1.7) 


 


Heterophil Agglutinins


 Negative


(NEGATIVE) 


 


 





 


Influenza Type A Antigen


 Negative


(NEGATIVE) 


 


 





 


Influenza Type B Antigen


 Negative


(NEGATIVE) 


 


 





 


SARS-CoV-2 RNA (OH)


 Negative


(Negative) 


 


 





 


SARS-CoV-2 Antigen (Rapid)


 Negative


(NEGATIVE) 


 


 





 


Group A Streptococcus Rapid


 


 Negative


(NEGATIVE) 


 





 


Test


 10/26/21


06:00 


 


 





 


Urine Collection Type Unknown    


 


Urine Color Yellow    


 


Urine Clarity Hazy    


 


Urine pH 5.5 (<5.0-8.0)    


 


Urine Specific Gravity


 1.015


(1.000-1.030) 


 


 





 


Urine Protein


 30 mg/dL


(NEG-TRACE) 


 


 





 


Urine Glucose (UA)


 Negative mg/dL


(NEG) 


 


 





 


Urine Ketones (Stick)


 Negative mg/dL


(NEG) 


 


 





 


Urine Blood Negative (NEG)    


 


Urine Nitrite Negative (NEG)    


 


Urine Bilirubin Negative (NEG)    


 


Urine Urobilinogen Dipstick


 1.0 mg/dL (0.2


mg/dL) 


 


 





 


Urine Leukocyte Esterase Negative (NEG)    


 


Urine RBC 0 /HPF (0-2)    


 


Urine WBC 1-4 /HPF (0-4)    


 


Urine Squamous Epithelial


Cells Few /LPF 


 


 


 





 


Urine Amorphous Sediment Present /HPF    


 


Urine Bacteria


 Few /HPF


(0-FEW) 


 


 





 


Urine Mucus Slight /LPF    











Assessment and Plan


Assessmemt and Plan


Problems


Medical Problems:


(1) History of fever


Status: Acute  





(2) Presence of cardiac pacemaker


Status: Acute  





Probable infected pacemaker (placed in August at )


Sepsis


Gram-positive bacteremia


History of SVT


History of ablation


Tobacco abuse


Anxiety, Depression, Hypothyroidism





Plan


Cardiac monitoring


IV Vanco IV Zosyn


Await infectious disease input


Await further cardiology input


Home meds


Obtaining old records from 


DVT prophylaxis


Full code





Comment


Review of Relevant


I have reviewed the following items hayley (where applicable) has been applied.


Medications:





Current Medications








 Medications


  (Trade)  Dose


 Ordered  Sig/Kwame


 Route


 PRN Reason  Start Time


 Stop Time Status Last Admin


Dose Admin


 


 Sodium Chloride  1,000 ml @ 


 1,000 mls/hr  1X  ONCE


 IV


   10/25/21 11:45


 10/25/21 12:44 DC 10/25/21 11:45





 


 Tramadol HCl


  (Ultram)  50 mg  PRN Q6HRS  PRN


 PO


 MODERATE-SEVERE PAIN  10/25/21 14:45


    10/26/21 05:41





 


 Acetaminophen


  (Tylenol)  650 mg  PRN Q6HRS  PRN


 PO


 MILD PAIN / TEMP > 100.3'F  10/25/21 14:45


    10/26/21 05:41





 


 Piperacillin Sod/


 Tazobactam Sod


 3.375 gm/Sodium


 Chloride  50 ml @ 


 100 mls/hr  Q6HRS


 IV


   10/25/21 16:00


    10/26/21 05:42





 


 Vancomycin HCl


 1.5 gm/Sodium


 Chloride  500 ml @ 


 250 mls/hr  1X  ONCE


 IV


   10/25/21 16:00


 10/25/21 17:59 DC 10/25/21 16:00





 


 Vancomycin HCl


  (Vanco Per


 Pharmacy)  1 each  PRN DAILY  PRN


 [facility]


 SEE COMMENTS  10/25/21 15:00


 10/26/21 10:23 DC 10/26/21 07:34





 


 Sodium Chloride  1,000 ml @ 


 125 mls/hr  Q8H


 IV


   10/25/21 15:00


 10/26/21 14:59  10/26/21 07:00





 


 Fluoxetine HCl


  (PROzac)  20 mg  DAILY


 PO


   10/26/21 09:00


    10/26/21 08:05





 


 Montelukast Sodium


  (Singulair)  10 mg  HS


 PO


   10/25/21 23:00


    10/25/21 23:41














Justifications for Admission


Other Justification














CASTLE,NIAL K III DO           Oct 26, 2021 11:06

## 2024-07-31 NOTE — CONSULTS
fibrillation (HCC) 05/04/2023    Diverticulitis 05/04/2023       Plan:     Would potentially benefit from AVR but has many acute, active issues which need to be resolved first. Currently, he is too high risk.  Has Urology on board for renal mass and BPH  Last + occult was 7/27, will need another occult on next BM, GI onboard  Volume overloaded, cont to balance renal fxn and diuresis, nephrology consult recommended  Cont IV ABX, and blood CX. PCR + for enterococcus faecalis  Obesity and debility dramatically increase risk, may potentially improve with decreasing volume overload  If he is going to have AVR, he will need cath  Cont PRBC transfusions for Anemia PRN  Agree with above assessment and plan.  Patient is 74 years old presented with cough and shortness of breath.  Patient is a poor historian and is not able to answer most questions.  Seems that he was hospitalized in [facility] for colitis GI bleeding and then he was discharged to rehab.  Patient has a history of hypertension diabetes, pulmonary embolism and atrial fibrillation.  On presentation patient had lower extremity swelling and an mild pulmonary edema CT scan.  Echocardiogram showed severe AI and mass from right coronary cusp 1.3 cm in maximum diameter.  Patient had a PCR positive for Enterococcus.  Patient is debilitated and can hardly walk.  Patient is currently hemodynamically stable and there is no acute indication for intervention on the aortic valve.  I recommend medical management of his heart failure and to continue antibiotics for his endocarditis.  Patient will eventually need aortic valve replacement.    Subjective:     Chief Complaint   Patient presents with    Cough    Shortness of Breath    Leg Swelling     Bilateral leg swelling       History of Present Illness:   Max Aly is a 74 y.o. male with history as above who is a poor historian and has trouble answering questions. His HPI is taken from the chart. He was seen in  Speech slow, poor historian. Cranial nerves intact   Psychiatric:   appropriate .       Laboratory Data:   Labs independently reviewed  Lab Results   Component Value Date/Time    WBC 6.4 07/31/2024 03:36 AM    HGB 7.7 07/31/2024 01:17 PM    HCT 26.4 07/31/2024 01:17 PM     07/31/2024 03:36 AM     Lab Results   Component Value Date/Time     07/31/2024 03:36 AM    K 3.3 07/31/2024 03:36 AM     07/31/2024 03:36 AM    CO2 23 07/31/2024 03:36 AM    BUN 23 07/31/2024 03:36 AM     No results found for: \"CHOL\", \"CHLST\", \"CHOLV\", \"HDL\", \"HDLC\", \"LDL\"  Hemoglobin A1C   Date Value Ref Range Status   05/04/2023 6.0 (H) 4.2 - 5.6 % Final     Comment:     (NOTE)  HbA1C Interpretive Ranges  <5.7              Normal  5.7 - 6.4         Consider Prediabetes  >6.5              Consider Diabetes       Lab Results   Component Value Date    ALT 8 (L) 07/30/2024    AST 12 07/30/2024    ALKPHOS 68 07/30/2024    BILITOT 1.7 (H) 07/30/2024     Lab Results   Component Value Date    TSH 1.39 05/04/2023       No results for input(s): \"CPK\", \"CKMB\" in the last 72 hours.    Invalid input(s): \"TROIQ\"  Lab Results   Component Value Date/Time    TSH 1.39 05/04/2023 06:25 AM       EKG: (independently reviewed)   Encounter Date: 07/26/24   EKG 12 Lead   Result Value    Ventricular Rate 75    Atrial Rate 75    P-R Interval 218    QRS Duration 108    Q-T Interval 400    QTc Calculation (Bazett) 446    P Axis 73    R Axis -19    T Axis 104    Diagnosis      Sinus rhythm with 1st degree AV block with premature atrial complexes  Minimal voltage criteria for LVH, may be normal variant ( R in aVL )  Septal infarct , age undetermined  Abnormal ECG  Confirmed by MD Keerthi, Chenet ([id number]) on 7/27/2024 9:22:24 AM         ECHO: independently reviewed    07/26/24    GRIFFIN (PRN CONTRAST/BUBBLE/3D) 07/31/2024  2:24 PM (Final)    Interpretation Summary    Image quality is adequate.    Left Ventricle: Normal left ventricular systolic function. Left  recognition software. Unrecognized errors in transcription may be present.    NOTE TO PATIENT:  The purpose of this note is to communicate optimally with the other providers involved in your care.  It is written using standard medical terminology.  If you have questions regarding details of the note please call my office at [phone] and make an appointment to discuss your concerns.

## 2024-07-31 NOTE — PROGRESS NOTES
TRANSFER - IN REPORT:    Verbal report received from Mariah RN (name) on Max Aly  being received from [address] [place] [address] (unit) for ordered procedure   Report consisted of patient’s Situation, Background, Assessment and   Recommendations(SBAR).   Information from the following report(s) SBAR, MAR, Quality Measures, and Dual Neuro Assessment was reviewed with the receiving nurse.  Opportunity for questions and clarification was provided.    Assessment completed upon patient’s arrival to unit and care assumed.

## 2024-07-31 NOTE — PROGRESS NOTES
TRANSFER - OUT REPORT:    Verbal report given to Mariah RN (name) on Max Aly being transferred to [address] [place] [address] (unit) for routine progression of patient care       Report consisted of patient's Situation, Background, Assessment and   Recommendations(SBAR).     Information from the following report(s) Nurse Handoff Report, Neuro Assessment, and Event Log was reviewed with the receiving nurse.    Opportunity for questions and clarification was provided.      Patient transported with:   O2 @ 3 liters  Tech

## 2024-07-31 NOTE — PROGRESS NOTES
Medicine Progress Note    Patient: Max Aly   Age:  74 y.o.  DOA: 7/26/2024   Admit Dx / CC: GI bleed [K92.2]  Iron deficiency anemia due to chronic blood loss [D50.0]  Lower GI bleed [K92.2]  Acute exacerbation of chronic obstructive pulmonary disease (COPD) (HCC) [J44.1]  LOS:  LOS: 4 days     Assessment/Plan   Principal Problem:    Gastrointestinal bleeding  Active Problems:    Hypertension    Hyperlipidemia    Type 2 diabetes mellitus with stage 3a chronic kidney disease, without long-term current use of insulin (HCC)    ASHLEY on CPAP    Benign prostatic hyperplasia without lower urinary tract symptoms    Stage 3a chronic kidney disease (HCC)    Hypokalemia    Hypomagnesemia    History of venous thromboembolism    Acute on chronic diastolic congestive heart failure (HCC)    H/O sick sinus syndrome    Aortic valve vegetation    Severe aortic regurgitation    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 *      Additional Plan notes     Gastrointestinal (a.k.a. GI) Bleed S/P Esophagogastroduodenoscopy (a.k.a. EGD) (7/29/2024), Acute on Chronic Anemia  - S/P Transfusion of 2 Units of PRBCs  Continue IV Pantoprazole 40 mg BID.  Transfuse to Target Hemoglobin of 7.0 g/dL.  Gastroenterological services are following.  Monitor.    Aortic Valve Vegetation 2°/2 Enterococcus faecalis Bacteremia  Continue IV Ceftriaxone, IV Vancomycin (started 7/31/2024), per Infectious Disease services.  Start IV Daptomycin.    Severe Aortic Regurgitation, Acute on Chronic Heart Failure with Preserved Ejection Fraction (a.k.a. HFpEF)  - Indication for Aortic Valve Replacement (also Aortic Valve Vegetation)  Strict I/Os, Daily Weight, Fluid Restriction 2.0 L/day, Furosemide 40 mg BID.  Cardiological services are following.    Benign Prostatic Hypertrophy (a.k.a. BPH)  Continue Tamsulosin and Finasteride.    History of Deep Vein Thrombosis (a.k.a. DVT),  Long-Term Anticoagulation (Apixaban)  HOLD Apixaban at this time due to #!.    Renal Masses  Urological services report that Right Renal Masses are stable and can be managed by outpatient Primary Urologist, [facility], when discharged.  Urological services does not feel that these masses should interfere with Aortic Valve Replacement Surgery.    Diabetes Mellitus Type 2 with Long-Term Insulin Use and Chronic Kidney Disease (a.k.a. CKD) IIIA  POC Glucose checks qACHS with SSI coverage.  Continue Long-Acting Insulin 20 units qHS.     Morbid Obesity (BMI 41.61; 131.5 kg as of 7/31/2024)    Family Communication/Updates:  [x]  Patient is without concern for Altered Mental Status, Dementia, and demonstrates ability to effectively communicate (i.e. can contact and update Family Members regarding medical status)  []  Patient has Delirium, Encephalopathy, Dementia, or is otherwise incapable of contacting and updating Family; Patient requires Physician to contact and Update Family of medical status    Disposition:  [x]  Physical Therapy (a.k.a. PT)/Occupational Therapy (a.k.a. OT) consulted    DVT Prophylaxis  Mechanoprophylaxis and Chemoprophylaxis areUnnecessary    DISPO   [x] Patient unsafe for discharge at this time for reasons addressed above  [] Patient is medically stable for discharge.  Patient is awaiting authorization to SNF/ARU and is otherwise PENDING Discharge.     Anticipated Date of Discharge: 8/03/2024 to 8/05/2024  Anticipated Disposition (home, SNF): [facility] versus SNF    Subjective:     Patient's hospital course of reviewed with Patient, as well as an explanation of impending Cardiac Surgical intervention for replacement of Aortic Valve Regurgitation in the setting of Infectious Endocarditis with vegetative growth on Aortic Valve.    Patient reports that his shortness of breath seems to have worsened over the visit.  Patient reports that his dyspnea on exertion is approximately as bad as  previous.    Objective:   Visit Vitals  BP (!) 110/55   Pulse 55   Temp 99.4 °F (37.4 °C) (Oral)   Resp 20   Ht 1.778 m (5' 10\")   Wt 131.1 kg (289 lb)   SpO2 97%   BMI 41.47 kg/m²       Physical Exam:   General:  Older adult male lying in bed in no acute distress  HEENT:  Atraumatic, normocephalic; Pupils equally round; Extraocular muscles grossly intact; Moist Oropharynx  Chest:  No pectus carinatum; No pectus excavatum  Cardiovascular:  Regular rate and rhythm without rubs, gallops, or murmurs appreciated  Respiratory:  (+) Crackles over Bilateral Mid-to-Lower Lung fields; No wheezes or rhonchi; normal effort of breathing  Abdominal:  (+) Morbid Obesity, soft, non-tense, non-tender abdomen; BS present without guarding, rebound, or masses  :  Deferred  Extremities:  Pulses 2+ x4 with (+) Trace Pitting  Edema to Right Proximal Tibia and (+) Left Distal Tibia without clubbing or cyanosis  Musculoskeletal:  Strength 5/5 in BUE and BLE  Integument:  No rash on face, forearms, or legs  Neurological:  Alert & Ostensibly Oriented x4/4; No gross deficits of Visual Acuity, Eye Movement, Jaw Opening, Facial Expression, Hearing, Phonation, or Head Movement; No gross deficits of Tongue Movement or Slurring of Speech  Psychiatric:  Affect is appropriate; Language is present and fluent; Behavior is appropriate      Intake and Output:  Current Shift:  No intake/output data recorded.  Last three shifts:  07/29 1901 - 07/31 0700  In: 1870.4 [P.O.:1200]  Out: 3700 [Urine:3700]    Lab/Data Reviewed:  All labs obtained in [facility] in the last 24 hours have been reviewed.      Medications Reviewed:  Current Facility-Administered Medications   Medication Dose Route Frequency    potassium chloride (KLOR-CON M) extended release tablet 40 mEq  40 mEq Oral PRN    Or    potassium bicarb-citric acid (EFFER-K) effervescent tablet 40 mEq  40 mEq Oral PRN    Or    potassium chloride 10 mEq/100 mL IVPB (Peripheral Line)  10 mEq IntraVENous PRN

## 2024-07-31 NOTE — PLAN OF CARE
Problem: Pain  Goal: Verbalizes/displays adequate comfort level or baseline comfort level  Outcome: Progressing     Problem: Skin/Tissue Integrity  Goal: Absence of new skin breakdown  Description: 1.  Monitor for areas of redness and/or skin breakdown  2.  Assess vascular access sites hourly  3.  Every 4-6 hours minimum:  Change oxygen saturation probe site  4.  Every 4-6 hours:  If on nasal continuous positive airway pressure, respiratory therapy assess nares and determine need for appliance change or resting period.  7/31/2024 1115 by Ruba Ramirez, RN  Outcome: Progressing  7/31/2024 1115 by Ruba Ramirez, RN  Outcome: Progressing     Problem: Chronic Conditions and Co-morbidities  Goal: Patient's chronic conditions and co-morbidity symptoms are monitored and maintained or improved  Outcome: Progressing

## 2024-07-31 NOTE — PROGRESS NOTES
[facility] - Progress Note    Admit Date: 7/26/2024  Attending Cardiologist: Dr. Pendleton    Assessment:     -Anemia, GI bleed.  Hx colitis.  S/p colonoscopy 5/10/2024 - inadequate colon preparation with stool in the entire examined colon, however showed diffuse severe mucosal changes in rectum, sigmoid colon, descending colon, transverse colon and proximal ascending colon secondary to colitis, some of the areas look like ?pseudomembranous colitis, biopsied; diverticulosis in sigmoid colon; one 4 mm polyp in the cecum that was biopsied  S/p EGD 7/29/2024 - normal, no evidence of active or recent hemorrhage   -Chronic peripheral edema, uses support stockings, not on maintenance diuretics  -Echo 5/14/2024 ([facility]): LVEF 50-55%, no significant valvular stenosis or regurgitation within limits of study  -Sick sinus syndrome, S/p PPM 05/2017, s/p gen change 12/2020 ([facility]) with chronic intermittent sensing of atrial lead noise  -Hx pulmonary embolism, 08/2020, 05/2024, s/p IVC filter, on Eliquis  -Hx acute occlusive superficial vein thrombosis in LUE cephalic vein upper arm  -R upper renal mass, followed by [facility]  -Hx Melody's gangrene, s/p extensive scrotal debridement  -Hx BPH, hematuria, retentions, s/p cystoscopy, clot evacuation, limited TURP  -HTN, on Losartan, Diltiazem; Hx cough with ACEi  -DMII  -HLD  -ASHLEY, uses CPAP  -Obesity     Primary cardiologist is Dr. Hunt at [facility]     Plan:     Patient with mild fluid overload.  Agree with IV Lasix as needed  Patient echo with eccentric severe AI with aortic echodensity suggesting possible vegetation  Blood culture pending.  Risk-benefit alternative complication of procedure discussed with the patient and he is agreeable for GRIFFIN.    Addendum:  GRIFFIN showed moderate size aortic echodensity along with eccentric severe AI with pressure half-time approximately 200 ms  Possible Enterococcus  CT surgery consult has been  requested    Subjective:     No new complaints. Denies pain.      Objective:      Patient Vitals for the past 8 hrs:   Temp Pulse Resp BP SpO2   07/31/24 1240 98.7 °F (37.1 °C) 68 16 125/63 --   07/31/24 0845 99.4 °F (37.4 °C) 55 20 (!) 110/55 97 %   07/31/24 0658 -- 69 -- -- --           Patient Vitals for the past 96 hrs:   Weight   07/30/24 0812 131.1 kg (289 lb)                Current Facility-Administered Medications   Medication Dose Route Frequency    potassium chloride (KLOR-CON M) extended release tablet 40 mEq  40 mEq Oral PRN    Or    potassium bicarb-citric acid (EFFER-K) effervescent tablet 40 mEq  40 mEq Oral PRN    Or    potassium chloride 10 mEq/100 mL IVPB (Peripheral Line)  10 mEq IntraVENous PRN    magnesium sulfate 2000 mg in 50 mL IVPB premix  2,000 mg IntraVENous PRN    sodium phosphate 15 mmol in sodium chloride 0.9 % 250 mL IVPB  15 mmol IntraVENous PRN    perflutren lipid microspheres (DEFINITY) injection 2 mL  2 mL IntraVENous ONCE PRN    furosemide (LASIX) injection 40 mg  40 mg IntraVENous BID    guaiFENesin-dextromethorphan (ROBITUSSIN DM) 100-10 MG/5ML syrup 5 mL  5 mL Oral Q4H PRN    sodium chloride flush 0.9 % injection 5-40 mL  5-40 mL IntraVENous 2 times per day    sodium chloride flush 0.9 % injection 5-40 mL  5-40 mL IntraVENous PRN    0.9 % sodium chloride infusion   IntraVENous PRN    ondansetron (ZOFRAN-ODT) disintegrating tablet 4 mg  4 mg Oral Q8H PRN    Or    ondansetron (ZOFRAN) injection 4 mg  4 mg IntraVENous Q6H PRN    acetaminophen (TYLENOL) tablet 650 mg  650 mg Oral Q6H PRN    Or    acetaminophen (TYLENOL) suppository 650 mg  650 mg Rectal Q6H PRN    pantoprazole (PROTONIX) 40 mg in sodium chloride (PF) 0.9 % 10 mL injection  40 mg IntraVENous Q12H    insulin lispro (HUMALOG,ADMELOG) injection vial 0-8 Units  0-8 Units SubCUTAneous TID WC    insulin lispro (HUMALOG,ADMELOG) injection vial 0-4 Units  0-4 Units SubCUTAneous Nightly    glucose chewable tablet 16 g  4

## 2024-08-01 PROBLEM — R79.89 ELEVATED TROPONIN: Status: ACTIVE | Noted: 2024-08-01

## 2024-08-01 PROBLEM — Z86.711 HISTORY OF PULMONARY EMBOLISM: Status: ACTIVE | Noted: 2024-08-01

## 2024-08-01 LAB
ANION GAP SERPL CALC-SCNC: 8 MMOL/L (ref 3–18)
APPEARANCE UR: CLEAR
BASOPHILS # BLD: 0 K/UL (ref 0–0.1)
BASOPHILS NFR BLD: 0 % (ref 0–2)
BILIRUB UR QL: NEGATIVE
BUN SERPL-MCNC: 23 MG/DL (ref 7–18)
BUN/CREAT SERPL: 14 (ref 12–20)
CALCIUM SERPL-MCNC: 8.7 MG/DL (ref 8.5–10.1)
CHLORIDE SERPL-SCNC: 107 MMOL/L (ref 100–111)
CO2 SERPL-SCNC: 23 MMOL/L (ref 21–32)
COLOR UR: YELLOW
CREAT SERPL-MCNC: 1.69 MG/DL (ref 0.6–1.3)
CRP SERPL-MCNC: 10.6 MG/DL (ref 0–0.3)
DIFFERENTIAL METHOD BLD: ABNORMAL
EOSINOPHIL # BLD: 0.1 K/UL (ref 0–0.4)
EOSINOPHIL NFR BLD: 1 % (ref 0–5)
ERYTHROCYTE [DISTWIDTH] IN BLOOD BY AUTOMATED COUNT: 17.2 % (ref 11.6–14.5)
ERYTHROCYTE [SEDIMENTATION RATE] IN BLOOD: 121 MM/HR (ref 0–20)
GLUCOSE BLD STRIP.AUTO-MCNC: 168 MG/DL (ref 70–110)
GLUCOSE BLD STRIP.AUTO-MCNC: 213 MG/DL (ref 70–110)
GLUCOSE BLD STRIP.AUTO-MCNC: 223 MG/DL (ref 70–110)
GLUCOSE BLD STRIP.AUTO-MCNC: 231 MG/DL (ref 70–110)
GLUCOSE SERPL-MCNC: 185 MG/DL (ref 74–99)
GLUCOSE UR STRIP.AUTO-MCNC: NEGATIVE MG/DL
HCT VFR BLD AUTO: 25.2 % (ref 36–48)
HGB BLD-MCNC: 7.5 G/DL (ref 13–16)
HGB UR QL STRIP: NEGATIVE
IMM GRANULOCYTES # BLD AUTO: 0 K/UL (ref 0–0.04)
IMM GRANULOCYTES NFR BLD AUTO: 1 % (ref 0–0.5)
KETONES UR QL STRIP.AUTO: NEGATIVE MG/DL
LEUKOCYTE ESTERASE UR QL STRIP.AUTO: NEGATIVE
LYMPHOCYTES # BLD: 0.6 K/UL (ref 0.9–3.6)
LYMPHOCYTES NFR BLD: 8 % (ref 21–52)
MCH RBC QN AUTO: 25.9 PG (ref 24–34)
MCHC RBC AUTO-ENTMCNC: 29.8 G/DL (ref 31–37)
MCV RBC AUTO: 86.9 FL (ref 78–100)
MONOCYTES # BLD: 0.6 K/UL (ref 0.05–1.2)
MONOCYTES NFR BLD: 8 % (ref 3–10)
NEUTS SEG # BLD: 6.2 K/UL (ref 1.8–8)
NEUTS SEG NFR BLD: 83 % (ref 40–73)
NITRITE UR QL STRIP.AUTO: NEGATIVE
NRBC # BLD: 0 K/UL (ref 0–0.01)
NRBC BLD-RTO: 0 PER 100 WBC
PH UR STRIP: 5 (ref 5–8)
PLATELET # BLD AUTO: 231 K/UL (ref 135–420)
PMV BLD AUTO: 11.8 FL (ref 9.2–11.8)
POTASSIUM SERPL-SCNC: 3.6 MMOL/L (ref 3.5–5.5)
PROCALCITONIN SERPL-MCNC: 0.91 NG/ML
PROT UR STRIP-MCNC: NEGATIVE MG/DL
RBC # BLD AUTO: 2.9 M/UL (ref 4.35–5.65)
SODIUM SERPL-SCNC: 138 MMOL/L (ref 136–145)
SP GR UR REFRACTOMETRY: 1.02 (ref 1–1.03)
UROBILINOGEN UR QL STRIP.AUTO: 0.2 EU/DL (ref 0.2–1)
WBC # BLD AUTO: 7.5 K/UL (ref 4.6–13.2)

## 2024-08-01 PROCEDURE — 2580000003 HC RX 258: Performed by: INTERNAL MEDICINE

## 2024-08-01 PROCEDURE — 86140 C-REACTIVE PROTEIN: CPT

## 2024-08-01 PROCEDURE — 85025 COMPLETE CBC W/AUTO DIFF WBC: CPT

## 2024-08-01 PROCEDURE — 6370000000 HC RX 637 (ALT 250 FOR IP): Performed by: STUDENT IN AN ORGANIZED HEALTH CARE EDUCATION/TRAINING PROGRAM

## 2024-08-01 PROCEDURE — 80048 BASIC METABOLIC PNL TOTAL CA: CPT

## 2024-08-01 PROCEDURE — 99232 SBSQ HOSP IP/OBS MODERATE 35: CPT | Performed by: INTERNAL MEDICINE

## 2024-08-01 PROCEDURE — 94761 N-INVAS EAR/PLS OXIMETRY MLT: CPT

## 2024-08-01 PROCEDURE — 2700000000 HC OXYGEN THERAPY PER DAY

## 2024-08-01 PROCEDURE — 6360000002 HC RX W HCPCS: Performed by: INTERNAL MEDICINE

## 2024-08-01 PROCEDURE — 84145 PROCALCITONIN (PCT): CPT

## 2024-08-01 PROCEDURE — 97530 THERAPEUTIC ACTIVITIES: CPT

## 2024-08-01 PROCEDURE — 36415 COLL VENOUS BLD VENIPUNCTURE: CPT

## 2024-08-01 PROCEDURE — 85652 RBC SED RATE AUTOMATED: CPT

## 2024-08-01 PROCEDURE — 99233 SBSQ HOSP IP/OBS HIGH 50: CPT | Performed by: INTERNAL MEDICINE

## 2024-08-01 PROCEDURE — 82962 GLUCOSE BLOOD TEST: CPT

## 2024-08-01 PROCEDURE — P9047 ALBUMIN (HUMAN), 25%, 50ML: HCPCS | Performed by: INTERNAL MEDICINE

## 2024-08-01 PROCEDURE — 97110 THERAPEUTIC EXERCISES: CPT

## 2024-08-01 PROCEDURE — 6360000002 HC RX W HCPCS: Performed by: PHYSICIAN ASSISTANT

## 2024-08-01 PROCEDURE — 6370000000 HC RX 637 (ALT 250 FOR IP): Performed by: INTERNAL MEDICINE

## 2024-08-01 PROCEDURE — 1100000000 HC RM PRIVATE

## 2024-08-01 PROCEDURE — 97535 SELF CARE MNGMENT TRAINING: CPT

## 2024-08-01 RX ORDER — MECOBALAMIN 5000 MCG
5 TABLET,DISINTEGRATING ORAL NIGHTLY PRN
Status: DISCONTINUED | OUTPATIENT
Start: 2024-08-01 | End: 2024-08-09 | Stop reason: HOSPADM

## 2024-08-01 RX ORDER — IPRATROPIUM BROMIDE AND ALBUTEROL SULFATE 2.5; .5 MG/3ML; MG/3ML
1 SOLUTION RESPIRATORY (INHALATION) EVERY 4 HOURS PRN
Status: DISCONTINUED | OUTPATIENT
Start: 2024-08-01 | End: 2024-08-09 | Stop reason: HOSPADM

## 2024-08-01 RX ORDER — ALBUMIN (HUMAN) 12.5 G/50ML
25 SOLUTION INTRAVENOUS DAILY
Status: COMPLETED | OUTPATIENT
Start: 2024-08-01 | End: 2024-08-03

## 2024-08-01 RX ORDER — FUROSEMIDE 10 MG/ML
20 INJECTION INTRAMUSCULAR; INTRAVENOUS 2 TIMES DAILY
Status: DISCONTINUED | OUTPATIENT
Start: 2024-08-01 | End: 2024-08-03

## 2024-08-01 RX ORDER — ALBUMIN (HUMAN) 12.5 G/50ML
25 SOLUTION INTRAVENOUS DAILY
Status: DISCONTINUED | OUTPATIENT
Start: 2024-08-01 | End: 2024-08-01

## 2024-08-01 RX ADMIN — MUPIROCIN: 20 OINTMENT TOPICAL at 20:47

## 2024-08-01 RX ADMIN — ALBUMIN (HUMAN) 25 G: 0.25 INJECTION, SOLUTION INTRAVENOUS at 13:58

## 2024-08-01 RX ADMIN — SODIUM CHLORIDE, PRESERVATIVE FREE 10 ML: 5 INJECTION INTRAVENOUS at 20:47

## 2024-08-01 RX ADMIN — INSULIN LISPRO 2 UNITS: 100 INJECTION, SOLUTION INTRAVENOUS; SUBCUTANEOUS at 17:39

## 2024-08-01 RX ADMIN — SODIUM CHLORIDE, PRESERVATIVE FREE 10 ML: 5 INJECTION INTRAVENOUS at 08:42

## 2024-08-01 RX ADMIN — CETIRIZINE HYDROCHLORIDE 10 MG: 10 TABLET, FILM COATED ORAL at 08:42

## 2024-08-01 RX ADMIN — TAMSULOSIN HYDROCHLORIDE 0.4 MG: 0.4 CAPSULE ORAL at 08:42

## 2024-08-01 RX ADMIN — FLUTICASONE PROPIONATE 1 SPRAY: 50 SPRAY, METERED NASAL at 08:42

## 2024-08-01 RX ADMIN — INSULIN LISPRO 2 UNITS: 100 INJECTION, SOLUTION INTRAVENOUS; SUBCUTANEOUS at 12:11

## 2024-08-01 RX ADMIN — DILTIAZEM HYDROCHLORIDE 120 MG: 120 CAPSULE, EXTENDED RELEASE ORAL at 08:42

## 2024-08-01 RX ADMIN — FINASTERIDE 5 MG: 5 TABLET, FILM COATED ORAL at 08:42

## 2024-08-01 RX ADMIN — SODIUM CHLORIDE, PRESERVATIVE FREE 40 MG: 5 INJECTION INTRAVENOUS at 03:30

## 2024-08-01 RX ADMIN — INSULIN GLARGINE 20 UNITS: 100 INJECTION, SOLUTION SUBCUTANEOUS at 20:45

## 2024-08-01 RX ADMIN — FUROSEMIDE 40 MG: 10 INJECTION, SOLUTION INTRAMUSCULAR; INTRAVENOUS at 08:42

## 2024-08-01 RX ADMIN — SODIUM CHLORIDE, PRESERVATIVE FREE 40 MG: 5 INJECTION INTRAVENOUS at 16:46

## 2024-08-01 RX ADMIN — DAPTOMYCIN 750 MG: 500 INJECTION, POWDER, LYOPHILIZED, FOR SOLUTION INTRAVENOUS at 17:36

## 2024-08-01 ASSESSMENT — PAIN SCALES - GENERAL
PAINLEVEL_OUTOF10: 0

## 2024-08-01 NOTE — PLAN OF CARE
Problem: Safety - Adult  Goal: Free from fall injury  Outcome: Progressing     Problem: Pain  Goal: Verbalizes/displays adequate comfort level or baseline comfort level  7/31/2024 2102 by Mariah Dolan RN  Outcome: Progressing  7/31/2024 1115 by Ruba Ramirez RN  Outcome: Progressing     Problem: Respiratory - Adult  Goal: Achieves optimal ventilation and oxygenation  Outcome: Not Progressing     Problem: Cardiovascular - Adult  Goal: Maintains optimal cardiac output and hemodynamic stability  Outcome: Not Progressing     Problem: Skin/Tissue Integrity - Adult  Goal: Incisions, wounds, or drain sites healing without S/S of infection  Outcome: Progressing     Problem: Hematologic - Adult  Goal: Maintains hematologic stability  7/31/2024 1115 by Ruba Ramirez RN  Outcome: Progressing  7/31/2024 1115 by Ruba Ramirez RN  Outcome: Progressing     Problem: Skin/Tissue Integrity  Goal: Absence of new skin breakdown  Description: 1.  Monitor for areas of redness and/or skin breakdown  2.  Assess vascular access sites hourly  3.  Every 4-6 hours minimum:  Change oxygen saturation probe site  4.  Every 4-6 hours:  If on nasal continuous positive airway pressure, respiratory therapy assess nares and determine need for appliance change or resting period.  7/31/2024 2102 by Mariah Dolan RN  Outcome: Progressing  7/31/2024 1115 by Ruba Ramirez RN  Outcome: Progressing  7/31/2024 1115 by Ruba Ramirez RN  Outcome: Progressing     Problem: Respiratory - Adult  Goal: Achieves optimal ventilation and oxygenation  Outcome: Not Progressing     Problem: Cardiovascular - Adult  Goal: Maintains optimal cardiac output and hemodynamic stability  Outcome: Not Progressing     Problem: Chronic Conditions and Co-morbidities  Goal: Patient's chronic conditions and co-morbidity symptoms are monitored and maintained or improved  7/31/2024 2102 by Mariah Dolan RN  Outcome: Not Progressing  7/31/2024 1115 by Ruba Ramirez  RN  Outcome: Progressing

## 2024-08-01 NOTE — PROGRESS NOTES
Medicine Progress Note    Patient: Max Aly   Age:  74 y.o.  DOA: 7/26/2024   Admit Dx / CC: GI bleed [K92.2]  Iron deficiency anemia due to chronic blood loss [D50.0]  Lower GI bleed [K92.2]  Acute exacerbation of chronic obstructive pulmonary disease (COPD) (HCC) [J44.1]  LOS:  LOS: 5 days     Assessment/Plan   Principal Problem:    Gastrointestinal bleeding  Active Problems:    Hypertension    Hyperlipidemia    Type 2 diabetes mellitus with stage 3a chronic kidney disease, without long-term current use of insulin ([facility])    ASHLEY on CPAP    Benign prostatic hyperplasia without lower urinary tract symptoms    Stage 3a chronic kidney disease ([facility])    Hypokalemia    Hypomagnesemia    History of venous thromboembolism    Acute on chronic diastolic congestive heart failure ([facility])    H/O sick sinus syndrome    Aortic valve vegetation    Severe aortic regurgitation    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 *      Additional Plan notes     Gastrointestinal (a.k.a. GI) Bleed S/P Esophagogastroduodenoscopy (a.k.a. EGD) (7/29/2024), Acute on Chronic Anemia  - S/P Transfusion of 2 Units of PRBCs  Continue IV Pantoprazole 40 mg BID.  Transfuse to Target Hemoglobin of 7.0 g/dL.  Gastroenterological services are following.  Recheck Fecal Hemoccult.    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  STOPPED IV Vancomycin and IV Ceftriaxone.  Infectious Disease services are following.      Severe Aortic Regurgitation, Acute on Chronic Heart Failure with Preserved Ejection Fraction (a.k.a. HFpEF)  - Indication for Aortic Valve Replacement (also Aortic Valve Vegetation)  Strict I/Os, Daily Weight, Fluid Restriction 2.0 L/day, DECREASED IV Furosemide to 20 mg BID.  Cardiological services are following.  Ordered IV  Albumin 25% 25 grams qday x3 days starting today (8/01/2024).    Cardiothoracic services state that Patient is currently too High Risk for Surgical intervention owing to \"many acute, active issues.\"  Primary Focus is on Volume Overload, Bacteremia, and Fecal Hemoccult+ finding.  Given that Patient is not in fulminant CHF, Emergent Surgical intervention is not warranted, but surgical intervention is warranted long-term.     Creatinine increased today (Creatinine 1.6 mg/dL), possibly due to efforts to diurese.  REDUCED diuretic and added IV Albumin replacement qday.  Will attempt to optimize Patient for possible Aortic Valve Replacement later this visit.    Benign Prostatic Hypertrophy (a.k.a. BPH)  Continue Tamsulosin and Finasteride.    History of Deep Vein Thrombosis (a.k.a. DVT), Long-Term Anticoagulation (Apixaban)  HOLD Apixaban at this time due to #!.    Renal Masses  Urological services report that Right Renal Masses are stable and can be managed by outpatient Primary Urologist, [facility], when discharged.  Urological services does not feel that these masses should interfere with Aortic Valve Replacement Surgery.    Diabetes Mellitus Type 2 with Long-Term Insulin Use and Chronic Kidney Disease (a.k.a. CKD) IIIA  POC Glucose checks qACHS with SSI coverage.  Continue Long-Acting Insulin 20 units qHS.     Morbid Obesity (BMI 41.61; 131.5 kg as of 7/31/2024)     Family Communication/Updates:  [x]  Patient is without concern for Altered Mental Status, Dementia, and demonstrates ability to effectively communicate (i.e. can contact and update Family Members regarding medical status)    Nonetheless, spoke with Patient's Sister over the phone (dialed by Patient) and updated on Patient's condition on 8/01/2024.     Disposition:  [x]  Physical Therapy (a.k.a. PT)/Occupational Therapy (a.k.a. OT) consulted     DVT Prophylaxis  Mechanoprophylaxis is achieved with Bilateral SCDs     DISPO   [x] Patient unsafe for

## 2024-08-01 NOTE — CARE COORDINATION
SW placed HH and SNF referrals in care port as back up plan for patients dispo.    Helena Forman BSW   Case Management

## 2024-08-01 NOTE — PLAN OF CARE
Problem: Safety - Adult  Goal: Free from fall injury  7/31/2024 2251 by Meeta Downey RN  Outcome: Progressing  7/31/2024 2102 by Mariah Dolan RN  Outcome: Progressing     Problem: Pain  Goal: Verbalizes/displays adequate comfort level or baseline comfort level  7/31/2024 2251 by Meeta Downey RN  Outcome: Progressing  7/31/2024 2102 by Mariah Dolan RN  Outcome: Progressing  7/31/2024 1115 by Ruba Ramirez RN  Outcome: Progressing     Problem: Discharge Planning  Goal: Discharge to home or other facility with appropriate resources  Outcome: Progressing  Flowsheets (Taken 7/31/2024 2129)  Discharge to home or other facility with appropriate resources: Identify barriers to discharge with patient and caregiver     Problem: Respiratory - Adult  Goal: Achieves optimal ventilation and oxygenation  7/31/2024 2251 by Meeta Downey RN  Outcome: Progressing  7/31/2024 2102 by Mariah Dolan RN  Outcome: Not Progressing     Problem: Cardiovascular - Adult  Goal: Maintains optimal cardiac output and hemodynamic stability  7/31/2024 2251 by Meeta Downey RN  Outcome: Progressing  7/31/2024 2102 by Mariah Dolan RN  Outcome: Not Progressing  Goal: Absence of cardiac dysrhythmias or at baseline  Outcome: Progressing     Problem: Skin/Tissue Integrity - Adult  Goal: Skin integrity remains intact  Outcome: Progressing  Flowsheets (Taken 7/31/2024 2129)  Skin Integrity Remains Intact:   Monitor for areas of redness and/or skin breakdown   Assess vascular access sites hourly  Goal: Incisions, wounds, or drain sites healing without S/S of infection  7/31/2024 2251 by Meeta Downey RN  Outcome: Progressing  Flowsheets (Taken 7/31/2024 2129)  Incisions, Wounds, or Drain Sites Healing Without Sign and Symptoms of Infection: TWICE DAILY: Assess and document dressing/incision, wound bed, drain sites and surrounding tissue  7/31/2024 2102 by Mariah Dolan RN  Outcome: Progressing  Goal:  Oral mucous membranes remain intact  Outcome: Progressing  Flowsheets (Taken 7/31/2024 2129)  Oral Mucous Membranes Remain Intact: Assess oral mucosa and hygiene practices     Problem: Hematologic - Adult  Goal: Maintains hematologic stability  7/31/2024 2251 by Meeta Downey RN  Outcome: Progressing  Flowsheets (Taken 7/31/2024 2129)  Maintains hematologic stability: Assess for signs and symptoms of bleeding or hemorrhage  7/31/2024 1115 by Ruba Ramirez RN  Outcome: Progressing  7/31/2024 1115 by Ruba Ramirez RN  Outcome: Progressing     Problem: Skin/Tissue Integrity  Goal: Absence of new skin breakdown  Description: 1.  Monitor for areas of redness and/or skin breakdown  2.  Assess vascular access sites hourly  3.  Every 4-6 hours minimum:  Change oxygen saturation probe site  4.  Every 4-6 hours:  If on nasal continuous positive airway pressure, respiratory therapy assess nares and determine need for appliance change or resting period.  7/31/2024 2251 by Meeta Downey RN  Outcome: Progressing  7/31/2024 2102 by Mariah Dolan RN  Outcome: Progressing  7/31/2024 1115 by Ruba Ramirez RN  Outcome: Progressing  7/31/2024 1115 by Ruba Ramirez RN  Outcome: Progressing     Problem: Chronic Conditions and Co-morbidities  Goal: Patient's chronic conditions and co-morbidity symptoms are monitored and maintained or improved  7/31/2024 2251 by Meeta Downey RN  Outcome: Progressing  Flowsheets (Taken 7/31/2024 2129)  Care Plan - Patient's Chronic Conditions and Co-Morbidity Symptoms are Monitored and Maintained or Improved: Monitor and assess patient's chronic conditions and comorbid symptoms for stability, deterioration, or improvement  7/31/2024 2102 by Mariah Dolan, RN  Outcome: Not Progressing  7/31/2024 1115 by Ruba Ramirez RN  Outcome: Progressing     Problem: Respiratory - Adult  Goal: Achieves optimal ventilation and oxygenation  7/31/2024 2251 by Meeta Downey  RN  Outcome: Progressing  7/31/2024 2102 by Mariah Dolan RN  Outcome: Not Progressing     Problem: Cardiovascular - Adult  Goal: Maintains optimal cardiac output and hemodynamic stability  7/31/2024 2251 by Meeta Downey RN  Outcome: Progressing  7/31/2024 2102 by Mariah Dolan RN  Outcome: Not Progressing     Problem: Chronic Conditions and Co-morbidities  Goal: Patient's chronic conditions and co-morbidity symptoms are monitored and maintained or improved  7/31/2024 2251 by Meeta Downey RN  Outcome: Progressing  Flowsheets (Taken 7/31/2024 2129)  Care Plan - Patient's Chronic Conditions and Co-Morbidity Symptoms are Monitored and Maintained or Improved: Monitor and assess patient's chronic conditions and comorbid symptoms for stability, deterioration, or improvement  7/31/2024 2102 by Mariah Dolan RN  Outcome: Not Progressing  7/31/2024 1115 by Ruba Ramirez RN  Outcome: Progressing

## 2024-08-01 NOTE — PLAN OF CARE
Problem: Occupational Therapy - Adult  Goal: By Discharge: Performs self-care activities at highest level of function for planned discharge setting.  See evaluation for individualized goals.  Description: Occupational Therapy Goals:  Initiated 7/30/2024 to be met within 7-10 days.    1.  Patient will perform grooming with modified independence standing at sink >3 minutes.   2.  Patient will perform bathing with modified independence.  3.  Patient will perform lower body dressing  with modified independence.  4.  Patient will perform toilet transfers with modified independence.  5.  Patient will perform all aspects of toileting with modified independence.  6.  Patient will participate in upper extremity therapeutic exercise/activities with modified independence for 8-10 minutes to increase strength/endurance for ADLs.    7.  Patient will utilize energy conservation techniques during functional activities with verbal and visual cues.    PLOF: Pt lives in a 1 story home with his wife, previously Lenin in all ADLs.     Outcome: Progressing   OCCUPATIONAL THERAPY TREATMENT    Patient: Max Aly (74 y.o. male)  Date: 8/1/2024  Diagnosis: GI bleed [K92.2]  Iron deficiency anemia due to chronic blood loss [D50.0]  Lower GI bleed [K92.2]  Acute exacerbation of chronic obstructive pulmonary disease (COPD) (HCC) [J44.1] Gastrointestinal bleeding  Procedure(s) (LRB):  ESOPHAGOGASTRODUODENOSCOPY (N/A) 3 Days Post-Op  Precautions: Fall Risk, General Precautions,  ,  ,  ,  ,  ,  ,      Chart, occupational therapy assessment, plan of care, and goals were reviewed.  ASSESSMENT:  Pt presented sitting upright in reclining chair upon entry, on 3L O2NC, and agreeable to work with OT. Pt with noted labored breathing throughout session benefiting from VC's to perform PLB technique for optimal oxygenation. Pt maintaining in 90s on 3L. Pt reports PT with was just in room. Reviewed mult EC/WS techniques to perform during ADL's and  no apparent distress sitting up in chair  []  Patient left in no apparent distress in bed  [x]  Call bell left within reach  [x]  Nursing notified  []  Caregiver present  []  Bed alarm activated    COMMUNICATION/EDUCATION:   Patient Education  Education Given To: Patient  Education Provided: Role of Therapy;Plan of Care;Energy Conservation;Transfer Training;Fall Prevention Strategies;ADL Adaptive Strategies  Education Method: Verbal;Teach Back;Demonstration  Barriers to Learning: None  Education Outcome: Verbalized understanding;Continued education needed      Thank you for this referral.  ALF Salamanca  Minutes: 23

## 2024-08-01 NOTE — PROGRESS NOTES
Cardiology Progress Note    Admit Date: 7/26/2024  Attending Cardiologist: Dr. Rogers    IMPRESSION  Acute heart failure preserved ejection fraction, as observed by elevated NT proBNP greater than 3400, chest x-ray 7/26/2024 with cardiomegaly and moderate to severe vascular congestion, status post GRIFFIN showing moderate size aortic echodensity along eccentric severe AI  Severe aortic regurgitation, by transesophageal echocardiogram 7/31/2024  Elevated troponin 74 ng/L, in the context of supply/demand mismatch as above congestive heart failure, EKG 7/26/2024 normal sinus rhythm first-degree AV block with premature atrial complex minimal voltage criteria for LVH  Sick sinus syndrome with history of permanent pacemaker placement 5/2017 with generator change December 2020 chronic intermittent sensing of the atrial lead noise  History of pulmonary embolism 5/20/2024 with history of IVC filter on Eliquis  History of acute occlusive superficial vein thrombosis in left upper extremity cephalic vein of the upper arm  Hypertension - Normotensive to borderline blood pressure  Coronary risk equivalent diabetes mellitus  Hyperlipidemia  Obstructive sleep apnea on CPAP  History of Atrial fibrillation  Obesity    PLAN  Monitor fluid status, adjust as necessary, fluid restriction, salt intake <2g per day.  On Lasix 20 mg IV twice daily  Recommend Guideline Directed medical therapy as can tolerate.  Monitor blood pressure, adjust antihypertensive medications as necessary.  Statin if can tolerate prior to discharge.  CT surgery recommendations noted with continuation of antibiotics recommended with aortic valve replacement at some point in time discussed again with the patient and verbalized understanding.    Resume eliquis when okay with primary team for primary prevention of stroke  Continue with cardizem 120mg CD daily for atrial fibrillation.      Primary Cardiologist Dr. JACKSON Salter consult    Subjective:     Denies chest

## 2024-08-01 NOTE — PLAN OF CARE
widened base of support, decreased foot clearance and step length and increased trunk sway.  Pt was left sitting up in the recliner with needs in reach and nursing notified.    Progression toward goals:   []      Improving appropriately and progressing toward goals  [x]      Improving slowly and progressing toward goals  []      Not making progress toward goals and plan of care will be adjusted     PLAN:  Patient continues to benefit from skilled intervention to address the above impairments.  Continue treatment per established plan of care.    Further Equipment Recommendations for Discharge: rolling walker    AMPAC: AM-PAC Inpatient Mobility Raw Score : 15   ampac score decreased from initial evaluation, MD and  notified    Current research shows that an AM-PAC score of 17 (13 without stairs) or less is not associated with a discharge to the patient's home setting.    This AMPAC score should be considered in conjunction with interdisciplinary team recommendations to determine the most appropriate discharge setting. Patient's social support, diagnosis, medical stability, and prior level of function should also be taken into consideration.     SUBJECTIVE:   Patient stated, \"I'm tired but will try.\"    OBJECTIVE DATA SUMMARY:   Critical Behavior:   Pleasant and cooperative     Functional Mobility Training:  Bed Mobility:  Bed Mobility Training  Rolling: Minimum assistance  Supine to Sit: Moderate assistance  Transfers:  Transfer Training  Sit to Stand: Minimum assistance;Additional time  Stand to Sit: Minimum assistance  Bed to Chair: Minimum assistance  Balance:  Balance  Sitting - Static: Good (unsupported)  Standing: With support  Standing - Static: Fair  Standing - Dynamic:  (fair- with RW for support)     Ambulation/Gait Training:  Gait  Gait Training: Yes  Overall Level of Assistance: Minimum assistance  Distance (ft): 3 Feet  Assistive Device: Walker, rolling  Interventions: Verbal cues;Safety  awareness training  Base of Support: Widened  Speed/Rosenda: Slow  Step Length: Right shortened;Left shortened  Gait Abnormalities: Decreased step clearance;Trunk sway increased     Therapeutic Exercises:     EXERCISE   Sets   Reps   Active Active Assist   Passive Self ROM   Comments   Ankle Pumps 1 10  [x] [] [] []    Quad Sets/Glut Sets    [] [] [] [] Hold for 5 secs   Hamstring Sets   [] [] [] []    Short Arc Quads   [] [] [] []    Heel Slides 2 5 [x] [] [] []    Straight Leg Raises   [] [] [] []    Hip Add   [] [] [] [] Hold for 5 secs, w/ pillow squeeze   Long Arc Quads 2 5 [x] [] [] []    Seated Marching 2 5 [x] [] [] []    Standing Marching   [] [] [] []       [] [] [] []        Pain:  Intensity Pre-treatment: 0/10   Intensity Post-treatment: 0/10  Scale: Numeric Rating Scale  Location: n/a  Activity Tolerance:   Activity Tolerance: Patient limited by endurance;Patient limited by fatigue  Please refer to the flowsheet for vital signs taken during this treatment.  After treatment:   [x] Patient left in no apparent distress sitting up in chair  [] Patient left in no apparent distress in bed  [x] Call bell left within reach  [x] Nursing notified  [] Caregiver present  [] Bed alarm activated  [] SCDs applied      COMMUNICATION/EDUCATION:   Patient Education  Education Given To: Patient  Education Provided: Plan of Care;Transfer Training;Fall Prevention Strategies  Education Method: Verbal;Teach Back  Barriers to Learning: None  Education Outcome: Verbalized understanding;Demonstrated understanding;Continued education needed      Tereza Jennings PT  Minutes: 26

## 2024-08-02 LAB
ALBUMIN SERPL-MCNC: 2.3 G/DL (ref 3.4–5)
ALBUMIN/GLOB SERPL: 0.6 (ref 0.8–1.7)
ALP SERPL-CCNC: 57 U/L (ref 45–117)
ALT SERPL-CCNC: 10 U/L (ref 16–61)
ANION GAP SERPL CALC-SCNC: 7 MMOL/L (ref 3–18)
AST SERPL-CCNC: 12 U/L (ref 10–38)
BACTERIA SPEC CULT: NORMAL
BASOPHILS # BLD: 0 K/UL (ref 0–0.1)
BASOPHILS NFR BLD: 0 % (ref 0–2)
BILIRUB SERPL-MCNC: 0.8 MG/DL (ref 0.2–1)
BUN SERPL-MCNC: 23 MG/DL (ref 7–18)
BUN/CREAT SERPL: 15 (ref 12–20)
CALCIUM SERPL-MCNC: 8.7 MG/DL (ref 8.5–10.1)
CHLORIDE SERPL-SCNC: 109 MMOL/L (ref 100–111)
CO2 SERPL-SCNC: 25 MMOL/L (ref 21–32)
CREAT SERPL-MCNC: 1.51 MG/DL (ref 0.6–1.3)
DIFFERENTIAL METHOD BLD: ABNORMAL
EOSINOPHIL # BLD: 0.1 K/UL (ref 0–0.4)
EOSINOPHIL NFR BLD: 2 % (ref 0–5)
ERYTHROCYTE [DISTWIDTH] IN BLOOD BY AUTOMATED COUNT: 17 % (ref 11.6–14.5)
GLOBULIN SER CALC-MCNC: 3.8 G/DL (ref 2–4)
GLUCOSE BLD STRIP.AUTO-MCNC: 148 MG/DL (ref 70–110)
GLUCOSE BLD STRIP.AUTO-MCNC: 194 MG/DL (ref 70–110)
GLUCOSE BLD STRIP.AUTO-MCNC: 195 MG/DL (ref 70–110)
GLUCOSE BLD STRIP.AUTO-MCNC: 260 MG/DL (ref 70–110)
GLUCOSE SERPL-MCNC: 136 MG/DL (ref 74–99)
HCT VFR BLD AUTO: 24.1 % (ref 36–48)
HGB BLD-MCNC: 7.1 G/DL (ref 13–16)
IMM GRANULOCYTES # BLD AUTO: 0.1 K/UL (ref 0–0.04)
IMM GRANULOCYTES NFR BLD AUTO: 1 % (ref 0–0.5)
LYMPHOCYTES # BLD: 0.6 K/UL (ref 0.9–3.6)
LYMPHOCYTES NFR BLD: 9 % (ref 21–52)
MAGNESIUM SERPL-MCNC: 1.8 MG/DL (ref 1.6–2.6)
MCH RBC QN AUTO: 25.4 PG (ref 24–34)
MCHC RBC AUTO-ENTMCNC: 29.5 G/DL (ref 31–37)
MCV RBC AUTO: 86.4 FL (ref 78–100)
MONOCYTES # BLD: 0.7 K/UL (ref 0.05–1.2)
MONOCYTES NFR BLD: 9 % (ref 3–10)
NEUTS SEG # BLD: 5.6 K/UL (ref 1.8–8)
NEUTS SEG NFR BLD: 79 % (ref 40–73)
NRBC # BLD: 0 K/UL (ref 0–0.01)
NRBC BLD-RTO: 0 PER 100 WBC
PLATELET # BLD AUTO: 204 K/UL (ref 135–420)
PMV BLD AUTO: 11.8 FL (ref 9.2–11.8)
POTASSIUM SERPL-SCNC: 3.2 MMOL/L (ref 3.5–5.5)
PROT SERPL-MCNC: 6.1 G/DL (ref 6.4–8.2)
RBC # BLD AUTO: 2.79 M/UL (ref 4.35–5.65)
SERVICE CMNT-IMP: NORMAL
SODIUM SERPL-SCNC: 141 MMOL/L (ref 136–145)
WBC # BLD AUTO: 7.1 K/UL (ref 4.6–13.2)

## 2024-08-02 PROCEDURE — 99232 SBSQ HOSP IP/OBS MODERATE 35: CPT | Performed by: INTERNAL MEDICINE

## 2024-08-02 PROCEDURE — P9047 ALBUMIN (HUMAN), 25%, 50ML: HCPCS | Performed by: INTERNAL MEDICINE

## 2024-08-02 PROCEDURE — 2580000003 HC RX 258: Performed by: INTERNAL MEDICINE

## 2024-08-02 PROCEDURE — 6370000000 HC RX 637 (ALT 250 FOR IP): Performed by: INTERNAL MEDICINE

## 2024-08-02 PROCEDURE — 87040 BLOOD CULTURE FOR BACTERIA: CPT

## 2024-08-02 PROCEDURE — 83735 ASSAY OF MAGNESIUM: CPT

## 2024-08-02 PROCEDURE — 82962 GLUCOSE BLOOD TEST: CPT

## 2024-08-02 PROCEDURE — 6370000000 HC RX 637 (ALT 250 FOR IP): Performed by: STUDENT IN AN ORGANIZED HEALTH CARE EDUCATION/TRAINING PROGRAM

## 2024-08-02 PROCEDURE — 85025 COMPLETE CBC W/AUTO DIFF WBC: CPT

## 2024-08-02 PROCEDURE — 36415 COLL VENOUS BLD VENIPUNCTURE: CPT

## 2024-08-02 PROCEDURE — 80053 COMPREHEN METABOLIC PANEL: CPT

## 2024-08-02 PROCEDURE — 6360000002 HC RX W HCPCS: Performed by: INTERNAL MEDICINE

## 2024-08-02 PROCEDURE — 97535 SELF CARE MNGMENT TRAINING: CPT

## 2024-08-02 PROCEDURE — 1100000000 HC RM PRIVATE

## 2024-08-02 RX ADMIN — DILTIAZEM HYDROCHLORIDE 120 MG: 120 CAPSULE, EXTENDED RELEASE ORAL at 09:47

## 2024-08-02 RX ADMIN — SODIUM CHLORIDE, PRESERVATIVE FREE 10 ML: 5 INJECTION INTRAVENOUS at 09:49

## 2024-08-02 RX ADMIN — CETIRIZINE HYDROCHLORIDE 10 MG: 10 TABLET, FILM COATED ORAL at 09:47

## 2024-08-02 RX ADMIN — TAMSULOSIN HYDROCHLORIDE 0.4 MG: 0.4 CAPSULE ORAL at 09:47

## 2024-08-02 RX ADMIN — FINASTERIDE 5 MG: 5 TABLET, FILM COATED ORAL at 09:47

## 2024-08-02 RX ADMIN — FUROSEMIDE 20 MG: 10 INJECTION, SOLUTION INTRAMUSCULAR; INTRAVENOUS at 09:47

## 2024-08-02 RX ADMIN — SODIUM CHLORIDE, PRESERVATIVE FREE 10 ML: 5 INJECTION INTRAVENOUS at 20:47

## 2024-08-02 RX ADMIN — FLUTICASONE PROPIONATE 1 SPRAY: 50 SPRAY, METERED NASAL at 09:49

## 2024-08-02 RX ADMIN — FUROSEMIDE 20 MG: 10 INJECTION, SOLUTION INTRAMUSCULAR; INTRAVENOUS at 17:04

## 2024-08-02 RX ADMIN — ALBUMIN (HUMAN) 25 G: 0.25 INJECTION, SOLUTION INTRAVENOUS at 09:49

## 2024-08-02 RX ADMIN — SODIUM CHLORIDE, PRESERVATIVE FREE 40 MG: 5 INJECTION INTRAVENOUS at 04:10

## 2024-08-02 RX ADMIN — INSULIN LISPRO 4 UNITS: 100 INJECTION, SOLUTION INTRAVENOUS; SUBCUTANEOUS at 13:33

## 2024-08-02 RX ADMIN — POTASSIUM CHLORIDE 40 MEQ: 1500 TABLET, EXTENDED RELEASE ORAL at 07:57

## 2024-08-02 RX ADMIN — SODIUM CHLORIDE, PRESERVATIVE FREE 40 MG: 5 INJECTION INTRAVENOUS at 17:04

## 2024-08-02 RX ADMIN — INSULIN GLARGINE 20 UNITS: 100 INJECTION, SOLUTION SUBCUTANEOUS at 20:43

## 2024-08-02 RX ADMIN — DAPTOMYCIN 750 MG: 500 INJECTION, POWDER, LYOPHILIZED, FOR SOLUTION INTRAVENOUS at 18:32

## 2024-08-02 ASSESSMENT — PAIN SCALES - GENERAL
PAINLEVEL_OUTOF10: 0

## 2024-08-02 NOTE — CARE COORDINATION
CM uploaded clinicals to [facility] for possible SNF placement.               Ava Thompson RN  Case Management [phone]

## 2024-08-02 NOTE — PROGRESS NOTES
Medicine Progress Note    Patient: Max Aly   Age:  74 y.o.  DOA: 7/26/2024   Admit Dx / CC: GI bleed [K92.2]  Iron deficiency anemia due to chronic blood loss [D50.0]  Lower GI bleed [K92.2]  Acute exacerbation of chronic obstructive pulmonary disease (COPD) (HCC) [J44.1]  LOS:  LOS: 6 days     Assessment/Plan   Principal Problem:    Gastrointestinal bleeding  Active Problems:    Hypertension    Hyperlipidemia    Type 2 diabetes mellitus with stage 3a chronic kidney disease, without long-term current use of insulin (HCC)    ASHLEY on CPAP    Benign prostatic hyperplasia without lower urinary tract symptoms    Stage 3a chronic kidney disease (HCC)    Hypokalemia    Hypomagnesemia    History of venous thromboembolism    Acute on chronic heart failure with preserved ejection fraction ([facility])    History of atrial fibrillation    Aortic valve vegetation    Severe aortic regurgitation    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 *      Additional Plan notes     Gastrointestinal (a.k.a. GI) Bleed S/P Esophagogastroduodenoscopy (a.k.a. EGD) (7/29/2024), Acute on Chronic Anemia  - S/P Transfusion of 2 Units of PRBCs  Continue IV Pantoprazole 40 mg BID.  Transfuse to Target Hemoglobin of 7.0 g/dL.  Gastroenterological services are following.      Recheck Fecal Hemoccult.    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  Infectious Disease services are following.      Severe Aortic Regurgitation, Acute on Chronic Heart Failure with Preserved Ejection Fraction (a.k.a. HFpEF)  - Indication for Aortic Valve Replacement (also Aortic Valve Vegetation)  Strict I/Os, Daily Weight, Fluid Restriction 2.0 L/day, continue IV Furosemide to 20 mg BID.  Cardiological  [Urine:200]  Last three shifts:  07/31 1901 - 08/02 0700  In: 365 [P.O.:360; I.V.:5]  Out: 2700 [Urine:2700]    Lab/Data Reviewed:  All labs obtained in [facility] in the last 24 hours have been reviewed.    Medications Reviewed:  Current Facility-Administered Medications   Medication Dose Route Frequency    ipratropium 0.5 mg-albuterol 2.5 mg (DUONEB) nebulizer solution 1 Dose  1 Dose Inhalation Q4H PRN    melatonin disintegrating tablet 5 mg  5 mg Oral Nightly PRN    furosemide (LASIX) injection 20 mg  20 mg IntraVENous BID    albumin human 25% IV solution 25 g  25 g IntraVENous Daily    DAPTOmycin (CUBICIN) 750 mg in sodium chloride (PF) 0.9 % 15 mL IV syringe  8 mg/kg (Adjusted) IntraVENous Q24H    potassium chloride (KLOR-CON M) extended release tablet 40 mEq  40 mEq Oral PRN    Or    potassium bicarb-citric acid (EFFER-K) effervescent tablet 40 mEq  40 mEq Oral PRN    Or    potassium chloride 10 mEq/100 mL IVPB (Peripheral Line)  10 mEq IntraVENous PRN    magnesium sulfate 2000 mg in 50 mL IVPB premix  2,000 mg IntraVENous PRN    sodium phosphate 15 mmol in sodium chloride 0.9 % 250 mL IVPB  15 mmol IntraVENous PRN    perflutren lipid microspheres (DEFINITY) injection 2 mL  2 mL IntraVENous ONCE PRN    guaiFENesin-dextromethorphan (ROBITUSSIN DM) 100-10 MG/5ML syrup 5 mL  5 mL Oral Q4H PRN    sodium chloride flush 0.9 % injection 5-40 mL  5-40 mL IntraVENous 2 times per day    sodium chloride flush 0.9 % injection 5-40 mL  5-40 mL IntraVENous PRN    0.9 % sodium chloride infusion   IntraVENous PRN    ondansetron (ZOFRAN-ODT) disintegrating tablet 4 mg  4 mg Oral Q8H PRN    Or    ondansetron (ZOFRAN) injection 4 mg  4 mg IntraVENous Q6H PRN    acetaminophen (TYLENOL) tablet 650 mg  650 mg Oral Q6H PRN    Or    acetaminophen (TYLENOL) suppository 650 mg  650 mg Rectal Q6H PRN    pantoprazole (PROTONIX) 40 mg in sodium chloride (PF) 0.9 % 10 mL injection  40 mg IntraVENous Q12H    insulin lispro (HUMALOG,ADMELOG)  injection vial 0-8 Units  0-8 Units SubCUTAneous TID WC    insulin lispro (HUMALOG,ADMELOG) injection vial 0-4 Units  0-4 Units SubCUTAneous Nightly    glucose chewable tablet 16 g  4 tablet Oral PRN    dextrose bolus 10% 125 mL  125 mL IntraVENous PRN    Or    dextrose bolus 10% 250 mL  250 mL IntraVENous PRN    glucagon (rDNA) injection 1 mg  1 mg SubCUTAneous PRN    dextrose 10 % infusion   IntraVENous Continuous PRN    mupirocin (BACTROBAN) 2 % ointment   Topical TID    vitamin D (ERGOCALCIFEROL) capsule 50,000 Units  50,000 Units Oral Weekly    traMADol (ULTRAM) tablet 25 mg  25 mg Oral TID PRN    tamsulosin (FLOMAX) capsule 0.4 mg  0.4 mg Oral Daily    fluticasone (FLONASE) 50 MCG/ACT nasal spray 1 spray  1 spray Each Nostril Daily    finasteride (PROSCAR) tablet 5 mg  5 mg Oral Daily    cetirizine (ZYRTEC) tablet 10 mg  10 mg Oral Daily    dilTIAZem (CARDIZEM CD) extended release capsule 120 mg  120 mg Oral Daily    [Held by provider] atorvastatin (LIPITOR) tablet 40 mg  40 mg Oral Daily    0.9 % sodium chloride infusion   IntraVENous PRN    insulin glargine (LANTUS) injection vial 20 Units  20 Units SubCUTAneous Q       Rubin Guerrero DO    August 2, 2024

## 2024-08-02 NOTE — PROGRESS NOTES
[facility] - Progress Note    Admit Date: 7/26/2024  Attending Cardiologist: Dr. 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  Hx colitis.  S/p colonoscopy 5/10/2024 - inadequate colon preparation with stool in the entire examined colon, however showed diffuse severe mucosal changes in rectum, sigmoid colon, descending colon, transverse colon and proximal ascending colon secondary to colitis, some of the areas look like ?pseudomembranous colitis, biopsied; diverticulosis in sigmoid colon; one 4 mm polyp in the cecum that was biopsied  S/p EGD 7/29/2024 - normal, no evidence of active or recent hemorrhage   -Chronic peripheral edema, uses support stockings, not on maintenance diuretics  -Echo 5/14/2024 ([place]): LVEF 50-55%, no significant valvular stenosis or regurgitation within limits of study  -Echo 7/30/2024 with normal LVEF 55-60%  -Sick sinus syndrome, S/p PPM 05/2017, s/p gen change 12/2020 ([employer]) with chronic intermittent sensing of atrial lead noise  -Hx pulmonary embolism, 08/2020, 05/2024, s/p IVC filter, on Eliquis  -Hx acute occlusive superficial vein thrombosis in LUE cephalic vein upper arm  -R upper renal mass, followed by [facility]  -Hx Melody's gangrene, s/p extensive scrotal debridement  -Hx BPH, hematuria, retentions, s/p cystoscopy, clot evacuation, limited TURP  -HTN, on Losartan, Diltiazem; Hx cough with ACEi  -DMII  -HLD  -ASHLEY, uses CPAP  -Obesity     Primary cardiologist is Dr. Hunt at [place]     Plan:       I saw, evaluated, interviewed and examined the patient personally.  Patient without any complaint.  Mild dyspnea.  No chest pain or chest tightness  Mild fluid overload on exam    GRIFFIN with severe AI and aortic vegetation in Enterococcus bacteremia  History of pulmonary embolism in 2020 s/p IVC filter    Discussed with CT surgery team

## 2024-08-02 NOTE — PLAN OF CARE
Problem: Occupational Therapy - Adult  Goal: By Discharge: Performs self-care activities at highest level of function for planned discharge setting.  See evaluation for individualized goals.  Description: Occupational Therapy Goals:  Initiated 7/30/2024 to be met within 7-10 days.    1.  Patient will perform grooming with modified independence standing at sink >3 minutes.   2.  Patient will perform bathing with modified independence.  3.  Patient will perform lower body dressing  with modified independence.  4.  Patient will perform toilet transfers with modified independence.  5.  Patient will perform all aspects of toileting with modified independence.  6.  Patient will participate in upper extremity therapeutic exercise/activities with modified independence for 8-10 minutes to increase strength/endurance for ADLs.    7.  Patient will utilize energy conservation techniques during functional activities with verbal and visual cues.    PLOF: Pt lives in a 1 story home with his wife, previously Lenin in all ADLs.     Outcome: Progressing   OCCUPATIONAL THERAPY TREATMENT    Patient: Max Aly (74 y.o. male)  Date: 8/2/2024  Diagnosis: GI bleed [K92.2]  Iron deficiency anemia due to chronic blood loss [D50.0]  Lower GI bleed [K92.2]  Acute exacerbation of chronic obstructive pulmonary disease (COPD) (HCC) [J44.1] Gastrointestinal bleeding  Procedure(s) (LRB):  ESOPHAGOGASTRODUODENOSCOPY (N/A) 4 Days Post-Op  Precautions: Fall Risk, General Precautions,  ,  ,  ,  ,  ,  ,      Chart, occupational therapy assessment, plan of care, and goals were reviewed.  ASSESSMENT:  Pt sitting up in recliner chair, agreeable to performing grooming task of oral hygiene, pt declined to perform in standing with RW for simulation of standing at sink, pt request to remain seated, appears with flat affect-nursing notified. Pt requesting to lay down in bed to rest, CGA STS and using RW for stand step w/ CGA fom recliner chair -> edge of  bed, sba supine-.sit edge of bed. Call bell within reach & pt verbalized understanding and provided return demonstration to utilize for assist e.g. functional transfers in order to prevent falls.     Progression toward goals:  []          Improving appropriately and progressing toward goals  [x]          Improving slowly and progressing toward goals  []          Not making progress toward goals and plan of care will be adjusted     PLAN:  Patient continues to benefit from skilled intervention to address the above impairments.  Continue treatment per established plan of care.    Further Equipment Recommendations for Discharge: bedside commode, hospital bed, shower chair, and rolling walker    AMPA: AM-PAC Inpatient Daily Activity Raw Score: 17    Current research shows that an AM-PAC score of 17 or less is not associated with a discharge to the patient's home setting.    This AMPAC score should be considered in conjunction with interdisciplinary team recommendations to determine the most appropriate discharge setting. Patient's social support, diagnosis, medical stability, and prior level of function should also be taken into consideration.     SUBJECTIVE:   Patient stated, \"I'll brush my teeth.\"    OBJECTIVE DATA SUMMARY:   Cognitive/Behavioral Status:  Orientation  Overall Orientation Status: Within Normal Limits  Orientation Level: Oriented X4  Cognition  Overall Cognitive Status: WFL    Functional Mobility and Transfers for ADLs:   Bed Mobility:  Bed Mobility Training  Bed Mobility Training: No   Transfers:  Transfer Training  Transfer Training: No         ADL Intervention:     Grooming: Stand by assistance       Pain:  Intensity Pre-treatment: 0/10   Intensity Post-treatment: 0/10      Activity Tolerance:    Activity Tolerance: Patient limited by endurance;Patient limited by fatigue  Please refer to the flowsheet for vital signs taken during this treatment.  After treatment:   []  Patient left in no apparent

## 2024-08-02 NOTE — SIGNIFICANT EVENT
INTERIM UPDATE - 1354 EST on 8/02/2024    [facility] Cardiological services calls to express concern that Patient would benefit from Aortic Valve Replacement sooner rather than later and suggests that Patient could be seen by his Primary Outpatient Cardiologist and Cardiothoracic Surgeon at [place] for a Second Opinion regarding Aortic Valve Replacement.  [facility] Cardiologist also reports that Patient may have a perforated valve.    Plan:  Will assess Patient and will likely contact [facility] Transfer Line and reach out to [facility] to discuss possible Transfer.        INTERIM UPDATE - 1522 EST on 8/02/2024    Called to report that discussion between [facility] Cardiological services and Cardiothoracic Surgical services have concluded that Aortic Valve Replacement may be performed later next week if Patient is stable, Blood Cultures are negative, (and if Hemoccult is negative or Patient is evaluated by Gastroenterological services after it returns positive a second time and active GI Bleed is ruled out and, presumably, Gastroenterological service does not oppose use of anticoagulation).    Plan:  Will postpone plans to Transfer Patient at this time.

## 2024-08-02 NOTE — PROGRESS NOTES
Infectious Disease progress Note        Reason: Evaluate for infective endocarditis    Current abx Prior abx         Lines:       Assessment :  80-year-old man with past medical history significant for paroxysmal atrial fibrillation, type 2 diabetes, hepatitis C, BPH, obesity, sleep apnea, C. difficile colitis-positive PCR October 2023 admitted to [facility] on 7/26/2024 for SOB.     Hospitalization at [facility] 05/24/2024-05/31/2024 for hematuria    Evidence of pseudomembranous colitis colonoscopy 5/2024 at [place]. placed prednisone taper per GI    Mass noted on right kidney on ct scan 5/2024  seen by Urology. Suspicion for renal cell carcinoma.    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     Antibiotic management further complicated due to prior history of C. difficile colitis, recent pseudomembranous colitis May 2024.  Risk of recurrent C. difficile colitis with use of broad-spectrum antibiotics    Recommendations:    recommend daptomycin  for  Enterococcus endocarditis  Follow up blood culture 7/31  Follow up cardiothoracic surgery recommendations regarding arotic valve replacement  Follow-up GI recommendations  5.   Needs close clinical monitoring       Above plan was discussed in details with patient, and dr Guerrero.  Please call me if any further questions or      Social History     Socioeconomic History    Marital status:      Spouse name: Not on file    Number of children: Not on file    Years of education: Not on file    Highest education level: Not on file   Occupational History    Not on file   Tobacco Use    Smoking status: Former     Types: Cigarettes    Smokeless tobacco: Never   Substance and Sexual Activity    Alcohol use: Not on file    Drug use: Never    Sexual activity: Not on file   Other Topics Concern    Not on file   Social History Narrative    Not on file     Social Determinants of Health     Financial Resource Strain: Not on file   Food Insecurity: No Food Insecurity (2024)    Hunger Vital Sign     Worried About Running Out of Food in the Last Year: Never true     Ran Out of Food in the Last Year: Never true   Transportation Needs: No Transportation Needs (2024)    PRAPARE - Transportation     Lack of Transportation (Medical): No     Lack of Transportation (Non-Medical): No   Physical Activity: Not on file   Stress: Not on file   Social Connections: Not on file   Intimate Partner Violence: Not on file   Housing Stability: Low Risk  (2024)    Housing Stability Vital Sign     Unable to Pay for Housing in the Last Year: No     Number of Places Lived in the Last Year: 1     Unstable Housing in the Last Year: No     Social History     Tobacco Use   Smoking Status Former    Types: Cigarettes   Smokeless Tobacco Never        Temp (24hrs), Av.5 °F (36.9 °C), Min:97.6 °F (36.4 °C), Max:99.7 °F (37.6 °C)    BP (!) 117/58   Pulse 70   Temp 98.6 °F (37 °C) (Oral)   Resp 17   Ht 1.778 m (5' 10\")   Wt 131.1 kg (289 lb)   SpO2 95%   BMI 41.47 kg/m²     ROS: Unable to obtain due to patient factors    Physical Exam:    General Appearance: NAD, conversant; obese appearing  HENT: normocephalic/atraumatic, moist mucus membranes  Neck: No JVD, supple  Lungs: Faint inspiratory rhonchi auscultated anteriorly; breath sounds are diminished  CV:  influenza should be considered based on a patients clinical presentation and empiric antiviral treatment should be considered, if indicated.        Influenza B Ag Negative                   RADIOLOGY:    All available imaging studies/reports in [facility] for this admission were reviewed    High complexity decision making was performed during the evaluation of this patient at high risk for decompensation      Above mentioned total time spent on reviewing the case/medical record/data/notes/EMR/patient examination/documentation/coordinating care with nurse/consultants, exclusive of procedures with complex decision making performed and > 50% time spent in face to face evaluation.      Disclaimer: Sections of this note are dictated utilizing voice recognition software, which may have resulted in some phonetic based errors in grammar and contents. Even though attempts were made to correct all the mistakes, some may have been missed, and remained in the body of the document. If questions arise, please contact our department.    Dr. Karen Perez, Infectious Disease Specialist  [phone]  August 1, 2024  8:44 PM

## 2024-08-02 NOTE — PROGRESS NOTES
Infectious Disease progress Note        Reason: Evaluate for infective endocarditis    Current abx Prior abx         Lines:       Assessment :  80-year-old man with past medical history significant for paroxysmal atrial fibrillation, type 2 diabetes, hepatitis C, BPH, obesity, sleep apnea, C. difficile colitis-positive PCR October 2023 admitted to [facility] on 7/26/2024 for SOB.     Hospitalization at [facility] 05/24/2024-05/31/2024 for hematuria    Evidence of pseudomembranous colitis colonoscopy 5/2024 at [place]. placed prednisone taper per GI    Mass noted on right kidney on ct scan 5/2024  seen by Urology. Suspicion for renal cell carcinoma.    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     Cardiology, cardiothoracic surgery evaluation appreciated.  Patient considered high risk for surgical intervention.    Antibiotic management further complicated due to prior history of C. difficile colitis, recent pseudomembranous colitis May 2024.  Risk of recurrent C. difficile colitis with use of broad-spectrum antibiotics    Recommendations:    recommend daptomycin  for  Enterococcus endocarditis  Follow-up susceptibility of Enterococcus faecalis in blood culture 7/30.  Modify antibiotics accordingly  Repeat blood culture today  Follow up cardiothoracic surgery recommendations regarding timing of  by PCR Not detected        Enterobacter cloacae complex by PCR Not detected        Escherichia Coli Not detected        Klebsiella aerogenes by PCR Not detected        Klebsiella oxytoca by PCR Not detected        Klebsiella pneumoniae group by PCR Not detected        Proteus by PCR Not detected        Salmonella species by PCR Not detected        Serratia marcescens by PCR Not detected        Haemophilus Influenzae by PCR Not detected        Neisseria meningitidis by PCR Not detected        Pseudomonas aeruginosa Not detected        Stenotrophomonas maltophilia by PCR Not detected        Candida albicans by PCR Not detected        Candida auris by PCR Not detected        Candida glabrata Not detected        Candida krusei by PCR Not detected        Candida parapsilosis by PCR Not detected        Candida tropicalis by PCR Not detected        Cryptococcus neoformans/gattii by PCR Not detected        Resistant gene targets          Vancomycin resistance gene Detected        [employer] test comment       False positive results may rarely occur. Correlate with clinical,epidemiologic, and other laboratory findings           Comment: Please see BC[facility] Interpretation Guide in EPIC Links       COVID-19, Rapid [[id number]] Collected: 07/26/24 2055    Order Status: Completed Specimen: Nasopharyngeal Updated: 07/26/24 2116     Source Nasopharyngeal        SARS-CoV-2, Rapid Not detected        Comment: Rapid Abbott ID Now     Rapid NAAT:  The specimen is NEGATIVE for SARS-CoV-2, the novel coronavirus associated with COVID-19.     Negative results should be treated as presumptive and, if inconsistent with clinical signs and symptoms or necessary for patient management, should be tested with an alternative molecular assay.  Negative results do not preclude SARS-CoV-2 infection and should not be used as the sole basis for patient management decisions.     This test has been authorized by the FDA under an Emergency Use Authorization

## 2024-08-02 NOTE — PLAN OF CARE
Problem: Safety - Adult  Goal: Free from fall injury  Outcome: Progressing  Flowsheets (Taken 8/1/2024 0737 by Viki Moreira RN)  Free From Fall Injury: Instruct family/caregiver on patient safety     Problem: Pain  Goal: Verbalizes/displays adequate comfort level or baseline comfort level  Outcome: Progressing     Problem: Discharge Planning  Goal: Discharge to home or other facility with appropriate resources  Outcome: Progressing  Flowsheets (Taken 8/1/2024 2000)  Discharge to home or other facility with appropriate resources: Identify barriers to discharge with patient and caregiver     Problem: Respiratory - Adult  Goal: Achieves optimal ventilation and oxygenation  Outcome: Progressing     Problem: Cardiovascular - Adult  Goal: Maintains optimal cardiac output and hemodynamic stability  Outcome: Progressing  Flowsheets (Taken 8/1/2024 2000)  Maintains optimal cardiac output and hemodynamic stability: Monitor blood pressure and heart rate  Goal: Absence of cardiac dysrhythmias or at baseline  Outcome: Progressing  Flowsheets (Taken 8/1/2024 2000)  Absence of cardiac dysrhythmias or at baseline: Monitor cardiac rate and rhythm     Problem: Skin/Tissue Integrity - Adult  Goal: Skin integrity remains intact  Outcome: Progressing  Flowsheets  Taken 8/1/2024 2000 by Meeta Downey RN  Skin Integrity Remains Intact:   Monitor for areas of redness and/or skin breakdown   Assess vascular access sites hourly  Taken 8/1/2024 0737 by Viki Moreira RN  Skin Integrity Remains Intact:   Monitor for areas of redness and/or skin breakdown   Assess vascular access sites hourly  Goal: Incisions, wounds, or drain sites healing without S/S of infection  Outcome: Progressing  Flowsheets  Taken 8/1/2024 2000 by Meeta Downey RN  Incisions, Wounds, or Drain Sites Healing Without Sign and Symptoms of Infection: ADMISSION and DAILY: Assess and document risk factors for pressure ulcer development  Taken 8/1/2024 0737  by Viki Moreira RN  Incisions, Wounds, or Drain Sites Healing Without Sign and Symptoms of Infection: ADMISSION and DAILY: Assess and document risk factors for pressure ulcer development  Goal: Oral mucous membranes remain intact  Outcome: Progressing  Flowsheets  Taken 8/1/2024 2000 by Meeta Downey RN  Oral Mucous Membranes Remain Intact: Assess oral mucosa and hygiene practices  Taken 8/1/2024 0737 by Viki Moreira RN  Oral Mucous Membranes Remain Intact:   Assess oral mucosa and hygiene practices   Implement preventative oral hygiene regimen   Implement oral medicated treatments as ordered     Problem: Hematologic - Adult  Goal: Maintains hematologic stability  Outcome: Progressing  Flowsheets (Taken 8/1/2024 2000)  Maintains hematologic stability: Assess for signs and symptoms of bleeding or hemorrhage     Problem: Skin/Tissue Integrity  Goal: Absence of new skin breakdown  Description: 1.  Monitor for areas of redness and/or skin breakdown  2.  Assess vascular access sites hourly  3.  Every 4-6 hours minimum:  Change oxygen saturation probe site  4.  Every 4-6 hours:  If on nasal continuous positive airway pressure, respiratory therapy assess nares and determine need for appliance change or resting period.  Outcome: Progressing     Problem: Chronic Conditions and Co-morbidities  Goal: Patient's chronic conditions and co-morbidity symptoms are monitored and maintained or improved  Outcome: Progressing  Flowsheets (Taken 8/1/2024 2000)  Care Plan - Patient's Chronic Conditions and Co-Morbidity Symptoms are Monitored and Maintained or Improved: Monitor and assess patient's chronic conditions and comorbid symptoms for stability, deterioration, or improvement     Problem: Physical Therapy - Adult  Goal: By Discharge: Performs mobility at highest level of function for planned discharge setting.  See evaluation for individualized goals.  Description: Physical Therapy Goals:  Initiated 7/30/2024 to be met  ALF  Outcome: Progressing

## 2024-08-03 ENCOUNTER — APPOINTMENT (OUTPATIENT)
Facility: HOSPITAL | Age: 74
End: 2024-08-03
Payer: MEDICARE

## 2024-08-03 PROBLEM — D50.0 IRON DEFICIENCY ANEMIA DUE TO CHRONIC BLOOD LOSS: Status: ACTIVE | Noted: 2023-05-04

## 2024-08-03 LAB
ANION GAP SERPL CALC-SCNC: 6 MMOL/L (ref 3–18)
BACTERIA SPEC CULT: ABNORMAL
BASOPHILS # BLD: 0 K/UL (ref 0–0.1)
BASOPHILS NFR BLD: 0 % (ref 0–2)
BUN SERPL-MCNC: 22 MG/DL (ref 7–18)
BUN/CREAT SERPL: 14 (ref 12–20)
CALCIUM SERPL-MCNC: 9.3 MG/DL (ref 8.5–10.1)
CHLORIDE SERPL-SCNC: 112 MMOL/L (ref 100–111)
CO2 SERPL-SCNC: 24 MMOL/L (ref 21–32)
CREAT SERPL-MCNC: 1.58 MG/DL (ref 0.6–1.3)
DIFFERENTIAL METHOD BLD: ABNORMAL
EOSINOPHIL # BLD: 0.1 K/UL (ref 0–0.4)
EOSINOPHIL NFR BLD: 2 % (ref 0–5)
ERYTHROCYTE [DISTWIDTH] IN BLOOD BY AUTOMATED COUNT: 17 % (ref 11.6–14.5)
GLUCOSE BLD STRIP.AUTO-MCNC: 145 MG/DL (ref 70–110)
GLUCOSE BLD STRIP.AUTO-MCNC: 181 MG/DL (ref 70–110)
GLUCOSE BLD STRIP.AUTO-MCNC: 196 MG/DL (ref 70–110)
GLUCOSE BLD STRIP.AUTO-MCNC: 204 MG/DL (ref 70–110)
GLUCOSE SERPL-MCNC: 132 MG/DL (ref 74–99)
GRAM STN SPEC: ABNORMAL
HCT VFR BLD AUTO: 25.9 % (ref 36–48)
HCT VFR BLD AUTO: 27.1 % (ref 36–48)
HEMOCCULT STL QL: NEGATIVE
HGB BLD-MCNC: 7.4 G/DL (ref 13–16)
HGB BLD-MCNC: 8.2 G/DL (ref 13–16)
HISTORY CHECK: NORMAL
IMM GRANULOCYTES # BLD AUTO: 0 K/UL (ref 0–0.04)
IMM GRANULOCYTES NFR BLD AUTO: 1 % (ref 0–0.5)
LYMPHOCYTES # BLD: 0.7 K/UL (ref 0.9–3.6)
LYMPHOCYTES NFR BLD: 10 % (ref 21–52)
MCH RBC QN AUTO: 25.3 PG (ref 24–34)
MCHC RBC AUTO-ENTMCNC: 28.6 G/DL (ref 31–37)
MCV RBC AUTO: 88.4 FL (ref 78–100)
MONOCYTES # BLD: 0.7 K/UL (ref 0.05–1.2)
MONOCYTES NFR BLD: 9 % (ref 3–10)
NEUTS SEG # BLD: 5.6 K/UL (ref 1.8–8)
NEUTS SEG NFR BLD: 78 % (ref 40–73)
NRBC # BLD: 0 K/UL (ref 0–0.01)
NRBC BLD-RTO: 0 PER 100 WBC
PLATELET # BLD AUTO: 209 K/UL (ref 135–420)
PMV BLD AUTO: 11.8 FL (ref 9.2–11.8)
POTASSIUM SERPL-SCNC: 3.8 MMOL/L (ref 3.5–5.5)
RBC # BLD AUTO: 2.93 M/UL (ref 4.35–5.65)
SERVICE CMNT-IMP: ABNORMAL
SODIUM SERPL-SCNC: 142 MMOL/L (ref 136–145)
WBC # BLD AUTO: 7.2 K/UL (ref 4.6–13.2)

## 2024-08-03 PROCEDURE — 6360000002 HC RX W HCPCS: Performed by: INTERNAL MEDICINE

## 2024-08-03 PROCEDURE — P9047 ALBUMIN (HUMAN), 25%, 50ML: HCPCS | Performed by: INTERNAL MEDICINE

## 2024-08-03 PROCEDURE — 82272 OCCULT BLD FECES 1-3 TESTS: CPT

## 2024-08-03 PROCEDURE — 97110 THERAPEUTIC EXERCISES: CPT

## 2024-08-03 PROCEDURE — 71045 X-RAY EXAM CHEST 1 VIEW: CPT

## 2024-08-03 PROCEDURE — 86923 COMPATIBILITY TEST ELECTRIC: CPT

## 2024-08-03 PROCEDURE — 85014 HEMATOCRIT: CPT

## 2024-08-03 PROCEDURE — 82962 GLUCOSE BLOOD TEST: CPT

## 2024-08-03 PROCEDURE — 2580000003 HC RX 258: Performed by: INTERNAL MEDICINE

## 2024-08-03 PROCEDURE — 36430 TRANSFUSION BLD/BLD COMPNT: CPT

## 2024-08-03 PROCEDURE — 85025 COMPLETE CBC W/AUTO DIFF WBC: CPT

## 2024-08-03 PROCEDURE — 1100000000 HC RM PRIVATE

## 2024-08-03 PROCEDURE — 94761 N-INVAS EAR/PLS OXIMETRY MLT: CPT

## 2024-08-03 PROCEDURE — 86900 BLOOD TYPING SEROLOGIC ABO: CPT

## 2024-08-03 PROCEDURE — 36415 COLL VENOUS BLD VENIPUNCTURE: CPT

## 2024-08-03 PROCEDURE — 99232 SBSQ HOSP IP/OBS MODERATE 35: CPT | Performed by: INTERNAL MEDICINE

## 2024-08-03 PROCEDURE — 85018 HEMOGLOBIN: CPT

## 2024-08-03 PROCEDURE — 2700000000 HC OXYGEN THERAPY PER DAY

## 2024-08-03 PROCEDURE — 97530 THERAPEUTIC ACTIVITIES: CPT

## 2024-08-03 PROCEDURE — 6370000000 HC RX 637 (ALT 250 FOR IP): Performed by: INTERNAL MEDICINE

## 2024-08-03 PROCEDURE — P9016 RBC LEUKOCYTES REDUCED: HCPCS

## 2024-08-03 PROCEDURE — 6370000000 HC RX 637 (ALT 250 FOR IP): Performed by: STUDENT IN AN ORGANIZED HEALTH CARE EDUCATION/TRAINING PROGRAM

## 2024-08-03 PROCEDURE — 86850 RBC ANTIBODY SCREEN: CPT

## 2024-08-03 PROCEDURE — 86901 BLOOD TYPING SEROLOGIC RH(D): CPT

## 2024-08-03 PROCEDURE — 80048 BASIC METABOLIC PNL TOTAL CA: CPT

## 2024-08-03 RX ORDER — FUROSEMIDE 10 MG/ML
40 INJECTION INTRAMUSCULAR; INTRAVENOUS 2 TIMES DAILY
Status: DISCONTINUED | OUTPATIENT
Start: 2024-08-03 | End: 2024-08-08

## 2024-08-03 RX ORDER — SODIUM CHLORIDE 9 MG/ML
INJECTION, SOLUTION INTRAVENOUS PRN
Status: DISCONTINUED | OUTPATIENT
Start: 2024-08-03 | End: 2024-08-04 | Stop reason: SDUPTHER

## 2024-08-03 RX ADMIN — CETIRIZINE HYDROCHLORIDE 10 MG: 10 TABLET, FILM COATED ORAL at 11:40

## 2024-08-03 RX ADMIN — DAPTOMYCIN 750 MG: 500 INJECTION, POWDER, LYOPHILIZED, FOR SOLUTION INTRAVENOUS at 18:37

## 2024-08-03 RX ADMIN — DILTIAZEM HYDROCHLORIDE 120 MG: 120 CAPSULE, EXTENDED RELEASE ORAL at 11:41

## 2024-08-03 RX ADMIN — INSULIN GLARGINE 20 UNITS: 100 INJECTION, SOLUTION SUBCUTANEOUS at 21:12

## 2024-08-03 RX ADMIN — ALBUMIN (HUMAN) 25 G: 0.25 INJECTION, SOLUTION INTRAVENOUS at 11:55

## 2024-08-03 RX ADMIN — VANCOMYCIN HYDROCHLORIDE 125 MG: 1 INJECTION, POWDER, LYOPHILIZED, FOR SOLUTION INTRAVENOUS at 15:41

## 2024-08-03 RX ADMIN — SODIUM CHLORIDE, PRESERVATIVE FREE 10 ML: 5 INJECTION INTRAVENOUS at 21:12

## 2024-08-03 RX ADMIN — FLUTICASONE PROPIONATE 1 SPRAY: 50 SPRAY, METERED NASAL at 11:49

## 2024-08-03 RX ADMIN — TAMSULOSIN HYDROCHLORIDE 0.4 MG: 0.4 CAPSULE ORAL at 11:41

## 2024-08-03 RX ADMIN — SODIUM CHLORIDE, PRESERVATIVE FREE 10 ML: 5 INJECTION INTRAVENOUS at 11:50

## 2024-08-03 RX ADMIN — SODIUM CHLORIDE, PRESERVATIVE FREE 40 MG: 5 INJECTION INTRAVENOUS at 15:41

## 2024-08-03 RX ADMIN — FINASTERIDE 5 MG: 5 TABLET, FILM COATED ORAL at 11:41

## 2024-08-03 RX ADMIN — FUROSEMIDE 20 MG: 10 INJECTION, SOLUTION INTRAMUSCULAR; INTRAVENOUS at 11:41

## 2024-08-03 RX ADMIN — ERGOCALCIFEROL 50000 UNITS: 1.25 CAPSULE ORAL at 11:40

## 2024-08-03 ASSESSMENT — PAIN SCALES - GENERAL
PAINLEVEL_OUTOF10: 0

## 2024-08-03 ASSESSMENT — PAIN SCALES - WONG BAKER: WONGBAKER_NUMERICALRESPONSE: NO HURT

## 2024-08-03 NOTE — PLAN OF CARE
Problem: Physical Therapy - Adult  Goal: By Discharge: Performs mobility at highest level of function for planned discharge setting.  See evaluation for individualized goals.  Description: Physical Therapy Goals:  Initiated 7/30/2024 to be met within 7-10 days.    1.  Patient will move from supine to sit and sit to supine , scoot up and down, and roll side to side in bed with supervision/set-up in order to safely get into/out of bed.    2.  Patient will transfer from bed to chair and chair to bed with supervision/set-up using RW in order to safely partake in OOB mobility.  3.  Patient will perform sit to stand with supervision/set-up in order to safely partake in OOB mobility.  4.  Patient will ambulate with supervision/set-up for 50 feet using RW in order to safely navigate household distances.   5.  Patient will ascend/descend 2 stairs with B handrail(s) with supervision/set-up in order to safely enter/exit home environment.    PLOF: lives with wife in 1 level home with 2 BRITTNI (B handrails), independent at baseline using rollator      Outcome: Progressing   PHYSICAL THERAPY TREATMENT    Patient: Max Aly (74 y.o. male)  Date: 8/3/2024  Diagnosis: GI bleed [K92.2]  Iron deficiency anemia due to chronic blood loss [D50.0]  Lower GI bleed [K92.2]  Acute exacerbation of chronic obstructive pulmonary disease (COPD) (HCC) [J44.1] Gastrointestinal bleeding  Procedure(s) (LRB):  ESOPHAGOGASTRODUODENOSCOPY (N/A) 5 Days Post-Op  Precautions: Fall Risk, Contact Precautions,  ,  ,  ,  ,  ,  ,      ASSESSMENT:  Pt in supine position in bed upon arrival. Pt supine > sit, SBA. Increased time allowance following transfer as pt reported increased dizziness. Pt required CGA to initiate SPT from EOB > recliner with use of RW. Pt tolerated therex well. Pt performed  additional STS from recliner > RW with SBA. Pt did not report feeling dizzy with task. Pt remained seated in recliner with call bell within reach and basic needs  met. Nursing staff notified.     Progression toward goals:   [x]      Improving appropriately and progressing toward goals  []      Improving slowly and progressing toward goals  []      Not making progress toward goals and plan of care will be adjusted     PLAN:  Patient continues to benefit from skilled intervention to address the above impairments.  Continue treatment per established plan of care.    Further Equipment Recommendations for Discharge: rolling walker    AMPA: AM-PAC Inpatient Mobility Raw Score : 16      Current research shows that an AM-PAC score of 17 (13 without stairs) or less is not associated with a discharge to the patient's home setting.    This AMPAC score should be considered in conjunction with interdisciplinary team recommendations to determine the most appropriate discharge setting. Patient's social support, diagnosis, medical stability, and prior level of function should also be taken into consideration.     SUBJECTIVE:   Patient stated, \"I'm not having any pain.\"    OBJECTIVE DATA SUMMARY:   Critical Behavior:  Orientation  Overall Orientation Status: Within Normal Limits  Orientation Level: Oriented X4  Cognition  Overall Cognitive Status: WFL    Functional Mobility Training:  Bed Mobility:  Bed Mobility Training  Bed Mobility Training: Yes  Supine to Sit: Stand-by assistance  Scooting: Stand-by assistance  Transfers:  Transfer Training  Transfer Training: Yes  Overall Level of Assistance: Stand-by assistance  Sit to Stand: Stand-by assistance  Stand to Sit: Stand-by assistance  Bed to Chair: Contact-guard assistance (with RW)  Balance:  Balance  Sitting: Intact  Sitting - Static: Fair (occasional)  Standing: With support  Standing - Static: Fair  Standing - Dynamic: Fair   Wheelchair Mobility:      Ambulation/Gait Training:     Gait  Gait Training: No  Neuro Re-Education:        Therapeutic Exercises:     EXERCISE   Sets   Reps   Active Active Assist   Passive Self ROM   Comments

## 2024-08-03 NOTE — PLAN OF CARE
Problem: Safety - Adult  Goal: Free from fall injury  Outcome: Progressing  Flowsheets (Taken 8/2/2024 1434 by Leydi Castillo RN)  Free From Fall Injury: Instruct family/caregiver on patient safety     Problem: Pain  Goal: Verbalizes/displays adequate comfort level or baseline comfort level  Outcome: Progressing     Problem: Discharge Planning  Goal: Discharge to home or other facility with appropriate resources  Outcome: Progressing  Flowsheets (Taken 8/2/2024 2010)  Discharge to home or other facility with appropriate resources: Identify barriers to discharge with patient and caregiver     Problem: Respiratory - Adult  Goal: Achieves optimal ventilation and oxygenation  Outcome: Progressing  Flowsheets (Taken 8/2/2024 2010)  Achieves optimal ventilation and oxygenation: Assess for changes in respiratory status

## 2024-08-03 NOTE — PROGRESS NOTES
[facility] - Progress Note    Admit Date: 7/26/2024  Attending Cardiologist: Dr. 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  Hx colitis.  S/p colonoscopy 5/10/2024 - inadequate colon preparation with stool in the entire examined colon, however showed diffuse severe mucosal changes in rectum, sigmoid colon, descending colon, transverse colon and proximal ascending colon secondary to colitis, some of the areas look like ?pseudomembranous colitis, biopsied; diverticulosis in sigmoid colon; one 4 mm polyp in the cecum that was biopsied  S/p EGD 7/29/2024 - normal, no evidence of active or recent hemorrhage   -Chronic peripheral edema, uses support stockings, not on maintenance diuretics  -Echo 5/14/2024 ([place]): LVEF 50-55%, no significant valvular stenosis or regurgitation within limits of study  -Echo 7/30/2024 with normal LVEF 55-60%  -Sick sinus syndrome, S/p PPM 05/2017, s/p gen change 12/2020 ([employer]) with chronic intermittent sensing of atrial lead noise  -Hx pulmonary embolism, 08/2020, 05/2024, s/p IVC filter, on Eliquis  -Hx acute occlusive superficial vein thrombosis in LUE cephalic vein upper arm  -R upper renal mass, followed by [facility]  -Hx Melody's gangrene, s/p extensive scrotal debridement  -Hx BPH, hematuria, retentions, s/p cystoscopy, clot evacuation, limited TURP  -HTN, on Losartan, Diltiazem; Hx cough with ACEi  -DMII  -HLD  -ASHLEY, uses CPAP  -Obesity     Primary cardiologist is Dr. Hunt at [facility]     Plan:     Will increase IV Lasix to 40 mg twice daily and hold p.o. Cardizem.  Let the heart rate rise a little with underlying's moderate to severe aortic regurgitation.  Underlying permanent pacemaker, status post GEN change 2020.  Would appreciate ID opinion if the pacemaker needs to be removed along with aortic valve surgery.  History of IVC

## 2024-08-03 NOTE — PROGRESS NOTES
[facility]  Progress Note    Patient: Max Aly Age: 74 y.o. : 1950 MR#: [id number] SSN: [id number]  Date/Time: 8/3/2024     C/C: Increasing cough      Subjective:   HPI : 74-year-old male past medical history of paroxysmal atrial fibrillation, diabetes mellitus type 2, hepatitis C, BPH, morbid obesity, sleep apnea admitted with increasing shortness of breath and possibility of acute blood loss anemia,    Echocardiogram done as a part of cardiology evaluation for CHF suggested patient has vegetation over aortic valve, later diagnosis Enterococcus endocarditis plan for possible cardiothoracic surgery and intervention if blood culture remains negative.      Review of Systems:  positive responses in bold type   Constitutional: Negative for fever, chills, diaphoresis and unexpected weight change.   HENT: Negative for ear pain, congestion, sore throat, rhinorrhea, drooling, trouble swallowing, neck pain and tinnitus.   Eyes: Negative for photophobia, pain, redness and visual disturbance.   Respiratory: negative for shortness of breath, cough, choking, chest tightness, wheezing or stridor.   Cardiovascular: Negative for chest pain, palpitations and leg swelling.   Gastrointestinal: Negative for nausea, vomiting, abdominal pain, diarrhea, constipation, blood in stool, abdominal distention and anal bleeding.   Genitourinary: Negative for dysuria, urgency, frequency, hematuria, flank pain and difficulty urinating.   Musculoskeletal: Negative for back pain and arthralgias.   Skin: Negative for color change, rash and wound.   Neurological: Negative for dizziness, seizures, syncope, speech difficulty, light-headedness or headaches.   Hematological: Does not bruise/bleed easily.   Psychiatric/Behavioral: Negative for suicidal ideas, hallucinations, behavioral problems, self-injury or agitation     Assessment/Plan:     1.  Acute dyspnea-improved  2 Enterococcus endocarditis  s/p GRIFFIN 2024, with associated AI  3 aortic regurgitation  4 anemia-anemia of chronic blood loss with positive stool occult but no overt bleeding-history of colonoscopy 5/10/2024 s/p EGD 2024-none showed any acute hemorrhagic site.  5 sick sinus syndrome s/p PPM 2017  6 pulmonary embolism chronic-s/p IVC filter on Eliquis  7 left upper extremity chronic occlusive superficial vein thrombosis  8 hypertension  9 diabetes mellitus type 2  10 ASHLEY-uses CPAP  11 morbid obesity with BMI of 41.61    Plan    -Discussed with cardiology, will transfuse 1 unit of PRBC as patient is symptomatic from his anemia, hemoglobin approximately around 8, this will help with his CHF  -Cardiology is increasing dose of Lasix  -Surgical intervention if blood culture remains negative  -Follow ID recommendation regarding antibiotics-currently on Daptomycin          Dispo Plan: 2024    Objective:       General:  Alert, cooperative, no acute distress   HEENT: No facial asymmetry, TIGIST Rahul, External ears - WNL    Cardiovascular: S1S2 - regular , No Murmur   Pulmonary: Equal expansion , No Use of accessory muscles , No Rales No Rhonchi    GI:  +BS in all four quadrants, soft, non-tender  Extremities:  No edema; 2+ dorsalis pedis pulses bilaterally  Neuro: Alert and oriented X 2.       DVT Prophylaxis:  []Lovenox  []Hep SQ  []SCDs  []Coumadin   []On Heparin gtt    [] Eliquis [] Xarelto     Vitals:         VS: /65   Pulse 78   Temp 98.1 °F (36.7 °C) (Oral)   Resp 18   Ht 1.778 m (5' 10\")   Wt 131.1 kg (289 lb)   SpO2 95%   BMI 41.47 kg/m²    Tmax/24hrs: Temp (24hrs), Av.3 °F (36.8 °C), Min:98 °F (36.7 °C), Max:98.8 °F (37.1 °C)        Medications:   Current Facility-Administered Medications   Medication Dose Route Frequency    furosemide (LASIX) injection 40 mg  40 mg IntraVENous BID    vancomycin (VANCOCIN) 50 mg/mL oral solution 125 mg  125 mg Oral Daily    ipratropium 0.5 mg-albuterol 2.5 mg (DUONEB) nebulizer

## 2024-08-03 NOTE — PROGRESS NOTES
Infectious Disease progress Note        Reason: Evaluate for infective endocarditis    Current abx Prior abx   Daptomycin (3 Aug -       Lines:       Assessment :  80-year-old man with past medical history significant for paroxysmal atrial fibrillation, type 2 diabetes, hepatitis C, BPH, obesity, sleep apnea, C. difficile colitis-positive PCR October 2023 admitted to [facility] on 7/26/2024 for SOB.     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 Vegetations noted on both TTE and GRIFFIN (7/30, 7/31), respectively.     Recommendations:  -- Continue daptomycin for Enterococcal endocarditis. Based on susceptibility results, may be able to treat with dual beta lactam therapy with ampicillin and ceftriaxone  -- With presence of cardiac implantable device and valvular vegetation on GRIFFIN, patient does meet criteria for CIED systemic infection. If possible, would recommend removal of the CIED device  --- Reimplantation would be reasonable after surveillance cultures following device removal is negative for 2 weeks  -- Follow up cardiothoracic surgery recommendations regarding timing of aortic valve replacement  -- Follow-up GI recommendations  -- With history of C diff colitis, will start PO vancomycin for secondary prevention  -- F/u culture results       Above plan was discussed in details with patient, cardiothoracic surgery team.     Please call me if any further questions or concerns. Will continue to participate in the care of this patient.    HPI:  Seen at bedside today. Feels ok overall. Denies any abdominal pain, N/V, diarrhea or additional concerns.     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

## 2024-08-04 LAB
ABO + RH BLD: NORMAL
ANION GAP SERPL CALC-SCNC: 6 MMOL/L (ref 3–18)
BASOPHILS # BLD: 0 K/UL (ref 0–0.1)
BASOPHILS NFR BLD: 0 % (ref 0–2)
BLD PROD TYP BPU: NORMAL
BLOOD BANK BLOOD PRODUCT EXPIRATION DATE: NORMAL
BLOOD BANK DISPENSE STATUS: NORMAL
BLOOD BANK ISBT PRODUCT BLOOD TYPE: 5100
BLOOD BANK PRODUCT CODE: NORMAL
BLOOD BANK UNIT TYPE AND RH: NORMAL
BLOOD GROUP ANTIBODIES SERPL: NORMAL
BPU ID: NORMAL
BUN SERPL-MCNC: 23 MG/DL (ref 7–18)
BUN/CREAT SERPL: 16 (ref 12–20)
CALCIUM SERPL-MCNC: 8.8 MG/DL (ref 8.5–10.1)
CALLED TO: NORMAL
CHLORIDE SERPL-SCNC: 109 MMOL/L (ref 100–111)
CO2 SERPL-SCNC: 24 MMOL/L (ref 21–32)
CREAT SERPL-MCNC: 1.46 MG/DL (ref 0.6–1.3)
CROSSMATCH RESULT: NORMAL
DIFFERENTIAL METHOD BLD: ABNORMAL
EOSINOPHIL # BLD: 0.1 K/UL (ref 0–0.4)
EOSINOPHIL NFR BLD: 2 % (ref 0–5)
ERYTHROCYTE [DISTWIDTH] IN BLOOD BY AUTOMATED COUNT: 16.6 % (ref 11.6–14.5)
ERYTHROCYTE [DISTWIDTH] IN BLOOD BY AUTOMATED COUNT: 16.7 % (ref 11.6–14.5)
GLUCOSE BLD STRIP.AUTO-MCNC: 150 MG/DL (ref 70–110)
GLUCOSE BLD STRIP.AUTO-MCNC: 154 MG/DL (ref 70–110)
GLUCOSE BLD STRIP.AUTO-MCNC: 182 MG/DL (ref 70–110)
GLUCOSE BLD STRIP.AUTO-MCNC: 205 MG/DL (ref 70–110)
GLUCOSE BLD STRIP.AUTO-MCNC: 257 MG/DL (ref 70–110)
GLUCOSE SERPL-MCNC: 145 MG/DL (ref 74–99)
HCT VFR BLD AUTO: 24.6 % (ref 36–48)
HCT VFR BLD AUTO: 29 % (ref 36–48)
HGB BLD-MCNC: 7.4 G/DL (ref 13–16)
HGB BLD-MCNC: 8.4 G/DL (ref 13–16)
HISTORY CHECK: NORMAL
IMM GRANULOCYTES # BLD AUTO: 0.1 K/UL (ref 0–0.04)
IMM GRANULOCYTES NFR BLD AUTO: 1 % (ref 0–0.5)
LYMPHOCYTES # BLD: 0.8 K/UL (ref 0.9–3.6)
LYMPHOCYTES NFR BLD: 11 % (ref 21–52)
MCH RBC QN AUTO: 25.1 PG (ref 24–34)
MCH RBC QN AUTO: 26.1 PG (ref 24–34)
MCHC RBC AUTO-ENTMCNC: 29 G/DL (ref 31–37)
MCHC RBC AUTO-ENTMCNC: 30.1 G/DL (ref 31–37)
MCV RBC AUTO: 86.6 FL (ref 78–100)
MCV RBC AUTO: 86.6 FL (ref 78–100)
MONOCYTES # BLD: 0.5 K/UL (ref 0.05–1.2)
MONOCYTES NFR BLD: 7 % (ref 3–10)
NEUTS SEG # BLD: 5.7 K/UL (ref 1.8–8)
NEUTS SEG NFR BLD: 79 % (ref 40–73)
NRBC # BLD: 0 K/UL (ref 0–0.01)
NRBC # BLD: 0 K/UL (ref 0–0.01)
NRBC BLD-RTO: 0 PER 100 WBC
NRBC BLD-RTO: 0 PER 100 WBC
PLATELET # BLD AUTO: 207 K/UL (ref 135–420)
PLATELET # BLD AUTO: 226 K/UL (ref 135–420)
PMV BLD AUTO: 11.4 FL (ref 9.2–11.8)
PMV BLD AUTO: 12.5 FL (ref 9.2–11.8)
POTASSIUM SERPL-SCNC: 3.4 MMOL/L (ref 3.5–5.5)
RBC # BLD AUTO: 2.84 M/UL (ref 4.35–5.65)
RBC # BLD AUTO: 3.35 M/UL (ref 4.35–5.65)
SODIUM SERPL-SCNC: 139 MMOL/L (ref 136–145)
SPECIMEN EXP DATE BLD: NORMAL
UNIT DIVISION: 0
UNIT ISSUE DATE/TIME: NORMAL
WBC # BLD AUTO: 7.2 K/UL (ref 4.6–13.2)
WBC # BLD AUTO: 7.9 K/UL (ref 4.6–13.2)

## 2024-08-04 PROCEDURE — 6370000000 HC RX 637 (ALT 250 FOR IP): Performed by: STUDENT IN AN ORGANIZED HEALTH CARE EDUCATION/TRAINING PROGRAM

## 2024-08-04 PROCEDURE — 6370000000 HC RX 637 (ALT 250 FOR IP): Performed by: INTERNAL MEDICINE

## 2024-08-04 PROCEDURE — 99232 SBSQ HOSP IP/OBS MODERATE 35: CPT | Performed by: INTERNAL MEDICINE

## 2024-08-04 PROCEDURE — 36569 INSJ PICC 5 YR+ W/O IMAGING: CPT

## 2024-08-04 PROCEDURE — 82962 GLUCOSE BLOOD TEST: CPT

## 2024-08-04 PROCEDURE — 02HV33Z INSERTION OF INFUSION DEVICE INTO SUPERIOR VENA CAVA, PERCUTANEOUS APPROACH: ICD-10-PCS | Performed by: INTERNAL MEDICINE

## 2024-08-04 PROCEDURE — 97530 THERAPEUTIC ACTIVITIES: CPT

## 2024-08-04 PROCEDURE — 80048 BASIC METABOLIC PNL TOTAL CA: CPT

## 2024-08-04 PROCEDURE — 6360000002 HC RX W HCPCS: Performed by: INTERNAL MEDICINE

## 2024-08-04 PROCEDURE — 36415 COLL VENOUS BLD VENIPUNCTURE: CPT

## 2024-08-04 PROCEDURE — 2140000001 HC CVICU INTERMEDIATE R&B

## 2024-08-04 PROCEDURE — 2580000003 HC RX 258: Performed by: INTERNAL MEDICINE

## 2024-08-04 PROCEDURE — 2700000000 HC OXYGEN THERAPY PER DAY

## 2024-08-04 PROCEDURE — 94761 N-INVAS EAR/PLS OXIMETRY MLT: CPT

## 2024-08-04 PROCEDURE — 85027 COMPLETE CBC AUTOMATED: CPT

## 2024-08-04 PROCEDURE — 85025 COMPLETE CBC W/AUTO DIFF WBC: CPT

## 2024-08-04 RX ORDER — SODIUM CHLORIDE 9 MG/ML
INJECTION, SOLUTION INTRAVENOUS PRN
Status: DISCONTINUED | OUTPATIENT
Start: 2024-08-04 | End: 2024-08-04 | Stop reason: SDUPTHER

## 2024-08-04 RX ORDER — BISACODYL 5 MG/1
5 TABLET, DELAYED RELEASE ORAL ONCE
Status: COMPLETED | OUTPATIENT
Start: 2024-08-04 | End: 2024-08-04

## 2024-08-04 RX ORDER — POTASSIUM CHLORIDE 20 MEQ/1
40 TABLET, EXTENDED RELEASE ORAL DAILY
Status: DISPENSED | OUTPATIENT
Start: 2024-08-04 | End: 2024-08-06

## 2024-08-04 RX ORDER — POLYETHYLENE GLYCOL 3350 17 G/17G
17 POWDER, FOR SOLUTION ORAL ONCE
Status: COMPLETED | OUTPATIENT
Start: 2024-08-04 | End: 2024-08-04

## 2024-08-04 RX ADMIN — POLYETHYLENE GLYCOL 3350 17 G: 17 POWDER, FOR SOLUTION ORAL at 17:59

## 2024-08-04 RX ADMIN — INSULIN GLARGINE 20 UNITS: 100 INJECTION, SOLUTION SUBCUTANEOUS at 20:45

## 2024-08-04 RX ADMIN — MUPIROCIN: 20 OINTMENT TOPICAL at 20:55

## 2024-08-04 RX ADMIN — FUROSEMIDE 40 MG: 10 INJECTION, SOLUTION INTRAMUSCULAR; INTRAVENOUS at 18:04

## 2024-08-04 RX ADMIN — FUROSEMIDE 40 MG: 10 INJECTION, SOLUTION INTRAMUSCULAR; INTRAVENOUS at 08:36

## 2024-08-04 RX ADMIN — AMPICILLIN 2000 MG: 2 INJECTION, POWDER, FOR SOLUTION INTRAMUSCULAR; INTRAVENOUS at 20:50

## 2024-08-04 RX ADMIN — FLUTICASONE PROPIONATE 1 SPRAY: 50 SPRAY, METERED NASAL at 08:37

## 2024-08-04 RX ADMIN — SODIUM CHLORIDE, PRESERVATIVE FREE 10 ML: 5 INJECTION INTRAVENOUS at 08:42

## 2024-08-04 RX ADMIN — TAMSULOSIN HYDROCHLORIDE 0.4 MG: 0.4 CAPSULE ORAL at 08:36

## 2024-08-04 RX ADMIN — SODIUM CHLORIDE, PRESERVATIVE FREE 10 ML: 5 INJECTION INTRAVENOUS at 20:57

## 2024-08-04 RX ADMIN — WATER 2000 MG: 1 INJECTION INTRAMUSCULAR; INTRAVENOUS; SUBCUTANEOUS at 14:16

## 2024-08-04 RX ADMIN — FINASTERIDE 5 MG: 5 TABLET, FILM COATED ORAL at 08:36

## 2024-08-04 RX ADMIN — INSULIN LISPRO 2 UNITS: 100 INJECTION, SOLUTION INTRAVENOUS; SUBCUTANEOUS at 12:25

## 2024-08-04 RX ADMIN — VANCOMYCIN HYDROCHLORIDE 125 MG: 1 INJECTION, POWDER, LYOPHILIZED, FOR SOLUTION INTRAVENOUS at 08:36

## 2024-08-04 RX ADMIN — SODIUM CHLORIDE, PRESERVATIVE FREE 40 MG: 5 INJECTION INTRAVENOUS at 04:26

## 2024-08-04 RX ADMIN — AMPICILLIN 2000 MG: 2 INJECTION, POWDER, FOR SOLUTION INTRAMUSCULAR; INTRAVENOUS at 18:08

## 2024-08-04 RX ADMIN — BISACODYL 5 MG: 5 TABLET, COATED ORAL at 17:59

## 2024-08-04 RX ADMIN — SODIUM CHLORIDE, PRESERVATIVE FREE 40 MG: 5 INJECTION INTRAVENOUS at 18:03

## 2024-08-04 RX ADMIN — CETIRIZINE HYDROCHLORIDE 10 MG: 10 TABLET, FILM COATED ORAL at 08:36

## 2024-08-04 RX ADMIN — POTASSIUM CHLORIDE 40 MEQ: 1500 TABLET, EXTENDED RELEASE ORAL at 14:16

## 2024-08-04 ASSESSMENT — PAIN SCALES - GENERAL
PAINLEVEL_OUTOF10: 0

## 2024-08-04 ASSESSMENT — PAIN SCALES - WONG BAKER: WONGBAKER_NUMERICALRESPONSE: NO HURT

## 2024-08-04 NOTE — PROGRESS NOTES
This patient received 288 mL of blood starting at 1538 and completed at 1828.  He tolerated procedure well and vitals were taken.

## 2024-08-04 NOTE — PROGRESS NOTES
[facility]  Progress Note    Patient: Max Aly Age: 74 y.o. : 1950 MR#: [id number] SSN: [id number]  Date/Time: 2024     C/C: Increasing cough      Subjective:   HPI : 74-year-old male past medical history of paroxysmal atrial fibrillation, diabetes mellitus type 2, hepatitis C, BPH, morbid obesity, sleep apnea admitted with increasing shortness of breath and possibility of acute blood loss anemia,    Echocardiogram done as a part of cardiology evaluation for CHF suggested patient has vegetation over aortic valve, later diagnosis Enterococcus endocarditis plan for possible cardiothoracic surgery and intervention if blood culture remains negative.      Review of Systems:  positive responses in bold type   Constitutional: Negative for fever, chills, diaphoresis and unexpected weight change.   HENT: Negative for ear pain, congestion, sore throat, rhinorrhea, drooling, trouble swallowing, neck pain and tinnitus.   Eyes: Negative for photophobia, pain, redness and visual disturbance.   Respiratory: negative for shortness of breath, cough, choking, chest tightness, wheezing or stridor.   Cardiovascular: Negative for chest pain, palpitations and leg swelling.   Gastrointestinal: Negative for nausea, vomiting, abdominal pain, diarrhea, constipation, blood in stool, abdominal distention and anal bleeding.   Genitourinary: Negative for dysuria, urgency, frequency, hematuria, flank pain and difficulty urinating.   Musculoskeletal: Negative for back pain and arthralgias.   Skin: Negative for color change, rash and wound.   Neurological: Negative for dizziness, seizures, syncope, speech difficulty, light-headedness or headaches.   Hematological: Does not bruise/bleed easily.   Psychiatric/Behavioral: Negative for suicidal ideas, hallucinations, behavioral problems, self-injury or agitation     Assessment/Plan:     1.  Acute dyspnea-improved  2 Enterococcus endocarditis

## 2024-08-04 NOTE — PROGRESS NOTES
[facility] - [facility]    Admit Date: 7/26/2024  Attending Cardiologist: Dr. 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  Hx colitis.  S/p colonoscopy 5/10/2024 - inadequate colon preparation with stool in the entire examined colon, however showed diffuse severe mucosal changes in rectum, sigmoid colon, descending colon, transverse colon and proximal ascending colon secondary to colitis, some of the areas look like ?pseudomembranous colitis, biopsied; diverticulosis in sigmoid colon; one 4 mm polyp in the cecum that was biopsied  S/p EGD 7/29/2024 - normal, no evidence of active or recent hemorrhage   -Chronic peripheral edema, uses support stockings, not on maintenance diuretics  -Echo 5/14/2024 ([place]): LVEF 50-55%, no significant valvular stenosis or regurgitation within limits of study  -Echo 7/30/2024 with normal LVEF 55-60%  -Sick sinus syndrome, S/p PPM 05/2017, s/p gen change 12/2020 ([employer]) with chronic intermittent sensing of atrial lead noise  -Hx pulmonary embolism, 08/2020, 05/2024, s/p IVC filter, on Eliquis  -Hx acute occlusive superficial vein thrombosis in LUE cephalic vein upper arm  -R upper renal mass, followed by [facility]  -Hx Melody's gangrene, s/p extensive scrotal debridement  -Hx BPH, hematuria, retentions, s/p cystoscopy, clot evacuation, limited TURP  -HTN, on Losartan, Diltiazem; Hx cough with ACEi  -DMII  -HLD  -ASHLEY, uses CPAP  -Obesity     Primary cardiologist is Dr. Hunt at [facility]     Plan:     CHF improving clinically as well as symptomatically.  Blood pressures stable and controlled.  Let the heart rate rise a little with underlying's moderate to severe aortic regurgitation.  Replace electrolytes.  Underlying permanent pacemaker, status post GEN change 2020.  Would appreciate ID opinion if the pacemaker needs to be removed  acid (EFFER-K) effervescent tablet 40 mEq  40 mEq Oral PRN    Or    potassium chloride 10 mEq/100 mL IVPB (Peripheral Line)  10 mEq IntraVENous PRN    magnesium sulfate 2000 mg in 50 mL IVPB premix  2,000 mg IntraVENous PRN    sodium phosphate 15 mmol in sodium chloride 0.9 % 250 mL IVPB  15 mmol IntraVENous PRN    perflutren lipid microspheres (DEFINITY) injection 2 mL  2 mL IntraVENous ONCE PRN    guaiFENesin-dextromethorphan (ROBITUSSIN DM) 100-10 MG/5ML syrup 5 mL  5 mL Oral Q4H PRN    sodium chloride flush 0.9 % injection 5-40 mL  5-40 mL IntraVENous 2 times per day    sodium chloride flush 0.9 % injection 5-40 mL  5-40 mL IntraVENous PRN    0.9 % sodium chloride infusion   IntraVENous PRN    ondansetron (ZOFRAN-ODT) disintegrating tablet 4 mg  4 mg Oral Q8H PRN    Or    ondansetron (ZOFRAN) injection 4 mg  4 mg IntraVENous Q6H PRN    acetaminophen (TYLENOL) tablet 650 mg  650 mg Oral Q6H PRN    Or    acetaminophen (TYLENOL) suppository 650 mg  650 mg Rectal Q6H PRN    pantoprazole (PROTONIX) 40 mg in sodium chloride (PF) 0.9 % 10 mL injection  40 mg IntraVENous Q12H    insulin lispro (HUMALOG,ADMELOG) injection vial 0-8 Units  0-8 Units SubCUTAneous TID WC    insulin lispro (HUMALOG,ADMELOG) injection vial 0-4 Units  0-4 Units SubCUTAneous Nightly    glucose chewable tablet 16 g  4 tablet Oral PRN    dextrose bolus 10% 125 mL  125 mL IntraVENous PRN    Or    dextrose bolus 10% 250 mL  250 mL IntraVENous PRN    glucagon (rDNA) injection 1 mg  1 mg SubCUTAneous PRN    dextrose 10 % infusion   IntraVENous Continuous PRN    mupirocin (BACTROBAN) 2 % ointment   Topical TID    vitamin D (ERGOCALCIFEROL) capsule 50,000 Units  50,000 Units Oral Weekly    traMADol (ULTRAM) tablet 25 mg  25 mg Oral TID PRN    tamsulosin (FLOMAX) capsule 0.4 mg  0.4 mg Oral Daily    fluticasone (FLONASE) 50 MCG/ACT nasal spray 1 spray  1 spray Each Nostril Daily    finasteride (PROSCAR) tablet 5 mg  5 mg Oral Daily    cetirizine (ZYRTEC)

## 2024-08-04 NOTE — PLAN OF CARE
Problem: Occupational Therapy - Adult  Goal: By Discharge: Performs self-care activities at highest level of function for planned discharge setting.  See evaluation for individualized goals.  Description: Occupational Therapy Goals:  Initiated 7/30/2024 to be met within 7-10 days.    1.  Patient will perform grooming with modified independence standing at sink >3 minutes.   2.  Patient will perform bathing with modified independence.  3.  Patient will perform lower body dressing  with modified independence.  4.  Patient will perform toilet transfers with modified independence.  5.  Patient will perform all aspects of toileting with modified independence.  6.  Patient will participate in upper extremity therapeutic exercise/activities with modified independence for 8-10 minutes to increase strength/endurance for ADLs.    7.  Patient will utilize energy conservation techniques during functional activities with verbal and visual cues.    PLOF: Pt lives in a 1 story home with his wife, previously Lenin in all ADLs.     Outcome: Progressing   OCCUPATIONAL THERAPY TREATMENT    Patient: Max Aly (74 y.o. male)  Date: 8/4/2024  Diagnosis: GI bleed [K92.2]  Iron deficiency anemia due to chronic blood loss [D50.0]  Lower GI bleed [K92.2]  Acute exacerbation of chronic obstructive pulmonary disease (COPD) (HCC) [J44.1] Gastrointestinal bleeding  Procedure(s) (LRB):  ESOPHAGOGASTRODUODENOSCOPY (N/A) 6 Days Post-Op  Precautions: Fall Risk, Contact Precautions    Chart, occupational therapy assessment, plan of care, and goals were reviewed.  ASSESSMENT:  Pt cleared for OT tx session and pt agreeable to participate. Completed bed mobility to sit EOB with SBA, noted SOB and required several minute RB to catch his breath. Rowdy for STS txr to RW and CGA to take ~5 steps to chair. SBA for stand to sit. Noted increased SOB once sitting and required another several minute rest break and education on PLB. Pt left semi-reclined in

## 2024-08-04 NOTE — PROGRESS NOTES
Infectious Disease progress Note    Reason: Evaluate for infective endocarditis    Current abx Prior abx   Daptomycin (3 Aug -       Lines:       Assessment :  80-year-old man with past medical history significant for paroxysmal atrial fibrillation, type 2 diabetes, hepatitis C, BPH, obesity, sleep apnea, C. difficile colitis-positive PCR October 2023 admitted to [facility] on 7/26/2024 for SOB.     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 Vegetations noted on both TTE and GRIFFIN (7/30, 7/31), respectively.     The susceptibility panel of the Enterococcus returned, with susceptibility to ampicillin, resistance to gentamicin, and interestingly susceptibility to vancomycin (the BCID reported a Van A/B mutation, which typically confers resistance to vancomycin)    Penicillin susceptible Enterococcal endocarditis is generally recommended to be treated with dual beta lactam therapy, with ampicillin + ceftriaxone. Will stop daptomycin today, and switch to amp + CTX.     Otherwise, no additional ID recommendations. Pt otherwise well, tolerating antibiotics w/o any issues.       Recommendations:  -- Stop daptomycin, start ampicillin + ceftriaxone  -- With presence of cardiac implantable device and valvular vegetation on GRIFFIN, patient does meet criteria for CIED systemic infection. If possible, would recommend removal of the CIED device  --- Reimplantation would be reasonable after surveillance cultures following device removal is negative for 2 weeks  -- Follow up cardiothoracic surgery recommendations regarding timing of aortic valve replacement  -- Follow-up GI recommendations  -- With history of C diff colitis, will start PO vancomycin 125 mg daily for secondary prevention  -- F/u culture results     Above plan was discussed in details with patient, cardiothoracic surgery team.     Please call me if any further  significant growth, <10,000 CFU/mL          Culture, Blood 1 [[id number]]  (Abnormal)  (Susceptibility) Collected: 07/30/24 1632    Order Status: Completed Specimen: Blood Updated: 08/03/24 1220     Special Requests --        NO SPECIAL REQUESTS  LEFT       Gram Stain       AEROBIC BOTTLE Gram positive cocci IN PAIRS                  SMEAR CALLED TO AND CORRECTLY REPEATED BY: DAX ODELL, 4N, AT 1402 ON [id number] TO SLT           Culture       Enterococcus faecalis GROWING IN THIS SINGLE BOTTLE DRAWN SITE = LEFT          Susceptibility        Enterococcus faecalis      BACTERIAL SUSCEPTIBILITY PANEL REGINO      ampicillin <=2 ug/mL Sensitive      DAPTOmycin 2 ug/mL Sensitive      gentamicin-syn  Resistant      linezolid 2 ug/mL Sensitive      streptomycin-syn  Resistant      vancomycin 1 ug/mL Sensitive                           Culture, Blood 2 [[id number]]  (Abnormal) Collected: 07/30/24 1632    Order Status: Completed Specimen: Blood Updated: 08/03/24 1221     Special Requests --        NO SPECIAL REQUESTS  RIGHT  HAND       Gram Stain       AEROBIC BOTTLE Gram positive cocci IN CHAINS                  SMEAR CALLED TO AND CORRECTLY REPEATED BY: DAX ODELL 4N ON [id number] AT 1635 HRS TO 1396.           Culture       Enterococcus faecalis GROWING IN 1 OF 2 BOTTLES DRAWN SITE = RH REFER TO [id number] FOR SENSITIVITIES          Culture, Blood, PCR ID Panel [[id number]]  (Abnormal) Collected: 07/30/24 1632    Order Status: Completed Specimen: Blood Updated: 07/31/24 1401     Accession Number [id number]     Enterococcus faecalis by PCR Detected        Enterococcus faecium by PCR Not detected        Listeria monocytogenes by PCR Not detected        STAPHYLOCOCCUS Not detected        Staphylococcus Aureus Not detected        Staphylococcus epidermidis by PCR Not detected        Staphylococcus lugdunensis by PCR Not detected        STREPTOCOCCUS Not detected        Streptococcus agalactiae (Group B) Not detected        Strep

## 2024-08-04 NOTE — PLAN OF CARE
Problem: Safety - Adult  Goal: Free from fall injury  8/4/2024 1555 by Shannon Figueredo, RN  Outcome: Progressing  8/4/2024 1555 by Shannon Figueredo RN  Outcome: Progressing     Problem: Pain  Goal: Verbalizes/displays adequate comfort level or baseline comfort level  Outcome: Progressing  Flowsheets (Taken 8/4/2024 0836 by Adry Braxton RN)  Verbalizes/displays adequate comfort level or baseline comfort level: Encourage patient to monitor pain and request assistance     Problem: Respiratory - Adult  Goal: Achieves optimal ventilation and oxygenation  Outcome: Progressing  Flowsheets (Taken 8/4/2024 0845 by Adry Braxton RN)  Achieves optimal ventilation and oxygenation: Assess for changes in respiratory status     Problem: Cardiovascular - Adult  Goal: Maintains optimal cardiac output and hemodynamic stability  Outcome: Progressing  Goal: Absence of cardiac dysrhythmias or at baseline  Outcome: Progressing     Problem: Skin/Tissue Integrity - Adult  Goal: Skin integrity remains intact  Outcome: Progressing     Problem: Occupational Therapy - Adult  Goal: By Discharge: Performs self-care activities at highest level of function for planned discharge setting.  See evaluation for individualized goals.  Description: Occupational Therapy Goals:  Initiated 7/30/2024 to be met within 7-10 days.    1.  Patient will perform grooming with modified independence standing at sink >3 minutes.   2.  Patient will perform bathing with modified independence.  3.  Patient will perform lower body dressing  with modified independence.  4.  Patient will perform toilet transfers with modified independence.  5.  Patient will perform all aspects of toileting with modified independence.  6.  Patient will participate in upper extremity therapeutic exercise/activities with modified independence for 8-10 minutes to increase strength/endurance for ADLs.    7.  Patient will utilize energy conservation techniques during functional  activities with verbal and visual cues.    PLOF: Pt lives in a 1 story home with his wife, previously Lenin in all ADLs.     8/4/2024 1138 by Roseanna Cardenas, MATA  Outcome: Progressing

## 2024-08-04 NOTE — PROGRESS NOTES
Tried to call phlebotomist to draw 1 hour post transfusion due at 1945, no tech available at this time.    2159 - Labs being collected.

## 2024-08-04 NOTE — PROGRESS NOTES
Cardiology Progress Note    Admit Date: 7/26/2024  Attending Cardiologist: Dr. Rogers    IMPRESSION  Endocarditis of the aortic valve  Acute heart failure preserved ejection fraction, as observed by elevated NT proBNP greater than 3400, chest x-ray 7/26/2024 with cardiomegaly and moderate to severe vascular congestion, status post GRIFFIN showing moderate size aortic echodensity along eccentric severe AI  Severe aortic regurgitation, by transesophageal echocardiogram 7/31/2024  Elevated troponin 74 ng/L, in the context of supply/demand mismatch as above congestive heart failure, EKG 7/26/2024 normal sinus rhythm first-degree AV block with premature atrial complex minimal voltage criteria for LVH  Sick sinus syndrome with history of permanent pacemaker placement 5/2017 with generator change December 2020 chronic intermittent sensing of the atrial lead noise  History of pulmonary embolism 5/20/2024 with history of IVC filter on Eliquis  History of acute occlusive superficial vein thrombosis in left upper extremity cephalic vein of the upper arm  Hypertension - Normotensive to borderline blood pressure  Coronary risk equivalent diabetes mellitus  Hyperlipidemia  Obstructive sleep apnea on CPAP  History of Atrial fibrillation  Obesity     PLAN  Monitor fluid status, adjust as necessary, fluid restriction, salt intake <2g per day.  Continue On Lasix 40mg IV BID.    Recommend Guideline Directed medical therapy as can tolerate.  Monitor blood pressure, adjust antihypertensive medications as necessary.  Statin if can tolerate prior to discharge.  CT surgery recommendations noted with continuation of antibiotics recommended with aortic valve replacement at some point in time discussed again with the patient and verbalized understanding.    May need to transfer patient for removal of pacemaker per infectious disease.  Anticoagulation currently held with MACY gill      Primary Cardiologist Dr. JACKSON Salter  consult    Subjective:     Denies chest pains, denies shortness of breath, denies abdominal pains    Objective:      Patient Vitals for the past 8 hrs:   Temp Pulse Resp BP SpO2   08/04/24 1557 97.7 °F (36.5 °C) 70 20 (!) 136/55 97 %   08/04/24 1335 98.4 °F (36.9 °C) 64 19 (!) 128/54 98 %   08/04/24 1158 97.8 °F (36.6 °C) 78 20 (!) 114/57 98 %         No data found.        Intake/Output Summary (Last 24 hours) at 8/4/2024 1813  Last data filed at 8/4/2024 0616  Gross per 24 hour   Intake 528 ml   Output 900 ml   Net -372 ml     EXAMINATION:  General:         Alert, cooperative, no distress  Head:  Normocephalic, without obvious abnormality, atraumatic.  Eyes:   Conjunctivae/corneas clear  Lungs:            Clear to auscultation bilaterally, no wheezes, no rales, no rhonchi  Heart:  Regular rate and rhythm, S1, S2 normal, no murmur, click, rub or gallop.  Abdomen:      Soft, non-tender. Bowel sounds normal.   Extremities:   Extremities normal, edema le bl  Skin:    No rashes or lesions visible extremities  Neurologic:     Normal strength, sensation      Visit Vitals  BP (!) 136/55   Pulse 70   Temp 97.7 °F (36.5 °C) (Oral)   Resp 20   Ht 1.778 m (5' 10\")   Wt 131.1 kg (289 lb)   SpO2 97%   BMI 41.47 kg/m²       Data Review:     Labs: Results:       Chemistry Recent Labs     08/02/24 0157 08/03/24 0229 08/04/24 0713    142 139   K 3.2* 3.8 3.4*    112* 109   CO2 25 24 24   BUN 23* 22* 23*   MG 1.8  --   --    GLOB 3.8  --   --       CBC w/Diff Recent Labs     08/02/24 0157 08/03/24 0229 08/03/24  2200 08/04/24  0713   WBC 7.1 7.2  --  7.2   RBC 2.79* 2.93*  --  2.84*   HGB 7.1* 7.4* 8.2* 7.4*   HCT 24.1* 25.9* 27.1* 24.6*    209  --  207      Cardiac Enzymes @[id number](CPK:*,CK:*,CPKMB:*,CKRMB:*,CKMMB:*,CKMB:*,RCK3:*,CKMBT:*,CKMBPC:*,CKSMB:*,CKNDX:*,CKND1:*,MOOKIE:*,TROQR:*,TROPT:*,TROIQ:*,TROIP:*,JAMARI:*,TROPIT:*,TROPT:*,TRPOIT:*,ITNL:*,TNIPOC:*,BNP:*,BNPP:*,PBNP:*)@   Coagulation No results for

## 2024-08-04 NOTE — PLAN OF CARE
Problem: Safety - Adult  Goal: Free from fall injury  Outcome: Progressing     Problem: Pain  Goal: Verbalizes/displays adequate comfort level or baseline comfort level  Outcome: Progressing  Flowsheets  Taken 8/3/2024 1828 by dAry Braxton RN  Verbalizes/displays adequate comfort level or baseline comfort level:   Encourage patient to monitor pain and request assistance   Assess pain using appropriate pain scale  Taken 8/3/2024 1545 by Adry Braxton RN  Verbalizes/displays adequate comfort level or baseline comfort level:   Encourage patient to monitor pain and request assistance   Assess pain using appropriate pain scale  Taken 8/3/2024 1119 by Adry Braxton RN  Verbalizes/displays adequate comfort level or baseline comfort level:   Assess pain using appropriate pain scale   Encourage patient to monitor pain and request assistance     Problem: Discharge Planning  Goal: Discharge to home or other facility with appropriate resources  Outcome: Progressing  Flowsheets (Taken 8/3/2024 1149 by Adry Braxton RN)  Discharge to home or other facility with appropriate resources:   Identify barriers to discharge with patient and caregiver   Arrange for needed discharge resources and transportation as appropriate     Problem: Respiratory - Adult  Goal: Achieves optimal ventilation and oxygenation  Outcome: Progressing  Flowsheets (Taken 8/3/2024 1149 by Adry Braxton RN)  Achieves optimal ventilation and oxygenation:   Assess for changes in mentation and behavior   Assess for changes in respiratory status     Problem: Cardiovascular - Adult  Goal: Maintains optimal cardiac output and hemodynamic stability  Outcome: Progressing  Flowsheets (Taken 8/3/2024 1149 by Adry Braxton RN)  Maintains optimal cardiac output and hemodynamic stability:   Monitor blood pressure and heart rate   Monitor urine output and notify Licensed Independent Practitioner for values outside of normal range  Goal: Absence of cardiac  maintained or improved  Outcome: Progressing  Flowsheets (Taken 8/3/2024 1149 by Adry Braxton, RN)  Care Plan - Patient's Chronic Conditions and Co-Morbidity Symptoms are Monitored and Maintained or Improved:   Monitor and assess patient's chronic conditions and comorbid symptoms for stability, deterioration, or improvement   Collaborate with multidisciplinary team to address chronic and comorbid conditions and prevent exacerbation or deterioration     Problem: Physical Therapy - Adult  Goal: By Discharge: Performs mobility at highest level of function for planned discharge setting.  See evaluation for individualized goals.  Description: Physical Therapy Goals:  Initiated 7/30/2024 to be met within 7-10 days.    1.  Patient will move from supine to sit and sit to supine , scoot up and down, and roll side to side in bed with supervision/set-up in order to safely get into/out of bed.    2.  Patient will transfer from bed to chair and chair to bed with supervision/set-up using RW in order to safely partake in OOB mobility.  3.  Patient will perform sit to stand with supervision/set-up in order to safely partake in OOB mobility.  4.  Patient will ambulate with supervision/set-up for 50 feet using RW in order to safely navigate household distances.   5.  Patient will ascend/descend 2 stairs with B handrail(s) with supervision/set-up in order to safely enter/exit home environment.    PLOF: lives with wife in 1 level home with 2 BRITTNI (B handrails), independent at baseline using rollator      8/3/2024 1249 by Deanna Wooten PTA  Outcome: Progressing

## 2024-08-04 NOTE — PROGRESS NOTES
1300 TRANSFER - IN REPORT:    Verbal report received from Marcie VERDUZCO on Max L Sheeba  being received from 4 N for change in patient condition (pre aortic valve repair)      Report consisted of patient's Situation, Background, Assessment and   Recommendations(SBAR).     Information from the following report(s) Nurse Handoff Report and MAR was reviewed with the receiving nurse.    Opportunity for questions and clarification was provided.      Assessment completed upon patient's arrival to unit and care assumed. Patient has external catheter on.    normal...

## 2024-08-04 NOTE — PROGRESS NOTES
Bedside and Verbal shift change report given to Kari RN (oncoming nurse) by Shannon RN (offgoing nurse). Report included the following information Nurse Handoff Report, Intake/Output, MAR, Recent Results, and Cardiac Rhythm NSR w/ 1st degree AV block .

## 2024-08-04 NOTE — PROGRESS NOTES
1500 Called Nursing Sup for PICC line placement. Order in EMR.    1900 H/H result in chart. Paged Dr Welsh and informed of result. Hold Blood transfusion for now.

## 2024-08-04 NOTE — PROGRESS NOTES
This nurse called CVT stepdown and gave report to Kristina VERDUZCO using SBAR.  Patient vitals taken and medications given.

## 2024-08-04 NOTE — PROCEDURES
PROCEDURE NOTE  Date: 8/4/2024   Name: Max Aly  YOB: 1950    Procedures  PICC Line Insertion Procedure Note    Procedure: Insertion of #4 FR/18G PICC    Indications:  Long Term IV therapy    Procedure Details   Informed consent was obtained for the procedure, including sedation.  Risks of lung perforation, hemorrhage, and adverse drug reaction were discussed. Bedside timeout performed with DAX Ortega, pt noted to have L AICD present, will proceed with R PICC placement for long term IV therapy.     #4 FR/18G PICC inserted to the R compressible brachial vein with MST/US per hospital protocol.  PICC advanced to CAJ with 3cg technology, placement confirmation obtained. Blood return:  yes    Findings:  Catheter inserted to 43 cm, with 0 cm. Exposed. Mid upper arm circumference is 37 cm.  Catheter was flushed with 30 cc NS. Secured and dressed per facility policy. Patient did tolerate procedure well. Bed in low position, call light in reach, handoff given to DAX Ortega    Recommendations:  Placement confirmed with 3cg technology, PICC line is okay to use.  PICC Brochure given to patient with teaching instruction.

## 2024-08-05 LAB
ALBUMIN SERPL-MCNC: 2.6 G/DL (ref 3.4–5)
ALBUMIN/GLOB SERPL: 0.7 (ref 0.8–1.7)
ALP SERPL-CCNC: 55 U/L (ref 45–117)
ALT SERPL-CCNC: 8 U/L (ref 16–61)
ANION GAP SERPL CALC-SCNC: 4 MMOL/L (ref 3–18)
AST SERPL-CCNC: 10 U/L (ref 10–38)
BASOPHILS # BLD: 0 K/UL (ref 0–0.1)
BASOPHILS NFR BLD: 0 % (ref 0–2)
BILIRUB SERPL-MCNC: 0.6 MG/DL (ref 0.2–1)
BUN SERPL-MCNC: 20 MG/DL (ref 7–18)
BUN/CREAT SERPL: 14 (ref 12–20)
CALCIUM SERPL-MCNC: 8.9 MG/DL (ref 8.5–10.1)
CHLORIDE SERPL-SCNC: 112 MMOL/L (ref 100–111)
CO2 SERPL-SCNC: 27 MMOL/L (ref 21–32)
CREAT SERPL-MCNC: 1.41 MG/DL (ref 0.6–1.3)
DIFFERENTIAL METHOD BLD: ABNORMAL
EOSINOPHIL # BLD: 0.1 K/UL (ref 0–0.4)
EOSINOPHIL NFR BLD: 2 % (ref 0–5)
ERYTHROCYTE [DISTWIDTH] IN BLOOD BY AUTOMATED COUNT: 16.6 % (ref 11.6–14.5)
GLOBULIN SER CALC-MCNC: 3.6 G/DL (ref 2–4)
GLUCOSE BLD STRIP.AUTO-MCNC: 128 MG/DL (ref 70–110)
GLUCOSE BLD STRIP.AUTO-MCNC: 139 MG/DL (ref 70–110)
GLUCOSE BLD STRIP.AUTO-MCNC: 142 MG/DL (ref 70–110)
GLUCOSE BLD STRIP.AUTO-MCNC: 144 MG/DL (ref 70–110)
GLUCOSE SERPL-MCNC: 145 MG/DL (ref 74–99)
HCT VFR BLD AUTO: 26.4 % (ref 36–48)
HGB BLD-MCNC: 7.8 G/DL (ref 13–16)
IMM GRANULOCYTES # BLD AUTO: 0.1 K/UL (ref 0–0.04)
IMM GRANULOCYTES NFR BLD AUTO: 1 % (ref 0–0.5)
LYMPHOCYTES # BLD: 0.8 K/UL (ref 0.9–3.6)
LYMPHOCYTES NFR BLD: 10 % (ref 21–52)
MCH RBC QN AUTO: 25.7 PG (ref 24–34)
MCHC RBC AUTO-ENTMCNC: 29.5 G/DL (ref 31–37)
MCV RBC AUTO: 86.8 FL (ref 78–100)
MONOCYTES # BLD: 0.6 K/UL (ref 0.05–1.2)
MONOCYTES NFR BLD: 8 % (ref 3–10)
NEUTS SEG # BLD: 5.7 K/UL (ref 1.8–8)
NEUTS SEG NFR BLD: 79 % (ref 40–73)
NRBC # BLD: 0 K/UL (ref 0–0.01)
NRBC BLD-RTO: 0 PER 100 WBC
PLATELET # BLD AUTO: 213 K/UL (ref 135–420)
PMV BLD AUTO: 12 FL (ref 9.2–11.8)
POTASSIUM SERPL-SCNC: 3.7 MMOL/L (ref 3.5–5.5)
PROT SERPL-MCNC: 6.2 G/DL (ref 6.4–8.2)
RBC # BLD AUTO: 3.04 M/UL (ref 4.35–5.65)
SODIUM SERPL-SCNC: 143 MMOL/L (ref 136–145)
WBC # BLD AUTO: 7.3 K/UL (ref 4.6–13.2)

## 2024-08-05 PROCEDURE — 6370000000 HC RX 637 (ALT 250 FOR IP): Performed by: INTERNAL MEDICINE

## 2024-08-05 PROCEDURE — 80053 COMPREHEN METABOLIC PANEL: CPT

## 2024-08-05 PROCEDURE — 6360000002 HC RX W HCPCS: Performed by: INTERNAL MEDICINE

## 2024-08-05 PROCEDURE — 2580000003 HC RX 258: Performed by: INTERNAL MEDICINE

## 2024-08-05 PROCEDURE — 2700000000 HC OXYGEN THERAPY PER DAY

## 2024-08-05 PROCEDURE — 94761 N-INVAS EAR/PLS OXIMETRY MLT: CPT

## 2024-08-05 PROCEDURE — 85025 COMPLETE CBC W/AUTO DIFF WBC: CPT

## 2024-08-05 PROCEDURE — 99232 SBSQ HOSP IP/OBS MODERATE 35: CPT | Performed by: INTERNAL MEDICINE

## 2024-08-05 PROCEDURE — 82962 GLUCOSE BLOOD TEST: CPT

## 2024-08-05 PROCEDURE — 2140000001 HC CVICU INTERMEDIATE R&B

## 2024-08-05 PROCEDURE — 99232 SBSQ HOSP IP/OBS MODERATE 35: CPT | Performed by: STUDENT IN AN ORGANIZED HEALTH CARE EDUCATION/TRAINING PROGRAM

## 2024-08-05 PROCEDURE — 6370000000 HC RX 637 (ALT 250 FOR IP): Performed by: STUDENT IN AN ORGANIZED HEALTH CARE EDUCATION/TRAINING PROGRAM

## 2024-08-05 RX ADMIN — AMPICILLIN 2000 MG: 2 INJECTION, POWDER, FOR SOLUTION INTRAMUSCULAR; INTRAVENOUS at 00:18

## 2024-08-05 RX ADMIN — INSULIN GLARGINE 20 UNITS: 100 INJECTION, SOLUTION SUBCUTANEOUS at 20:23

## 2024-08-05 RX ADMIN — WATER 2000 MG: 1 INJECTION INTRAMUSCULAR; INTRAVENOUS; SUBCUTANEOUS at 04:50

## 2024-08-05 RX ADMIN — CETIRIZINE HYDROCHLORIDE 10 MG: 10 TABLET, FILM COATED ORAL at 09:08

## 2024-08-05 RX ADMIN — SODIUM CHLORIDE, PRESERVATIVE FREE 40 MG: 5 INJECTION INTRAVENOUS at 16:53

## 2024-08-05 RX ADMIN — AMPICILLIN 2000 MG: 2 INJECTION, POWDER, FOR SOLUTION INTRAMUSCULAR; INTRAVENOUS at 17:04

## 2024-08-05 RX ADMIN — TAMSULOSIN HYDROCHLORIDE 0.4 MG: 0.4 CAPSULE ORAL at 09:08

## 2024-08-05 RX ADMIN — SODIUM CHLORIDE, PRESERVATIVE FREE 10 ML: 5 INJECTION INTRAVENOUS at 20:00

## 2024-08-05 RX ADMIN — MUPIROCIN: 20 OINTMENT TOPICAL at 13:48

## 2024-08-05 RX ADMIN — SODIUM CHLORIDE, PRESERVATIVE FREE 40 MG: 5 INJECTION INTRAVENOUS at 05:00

## 2024-08-05 RX ADMIN — FLUTICASONE PROPIONATE 1 SPRAY: 50 SPRAY, METERED NASAL at 09:10

## 2024-08-05 RX ADMIN — FUROSEMIDE 40 MG: 10 INJECTION, SOLUTION INTRAMUSCULAR; INTRAVENOUS at 09:06

## 2024-08-05 RX ADMIN — AMPICILLIN 2000 MG: 2 INJECTION, POWDER, FOR SOLUTION INTRAMUSCULAR; INTRAVENOUS at 13:43

## 2024-08-05 RX ADMIN — WATER 2000 MG: 1 INJECTION INTRAMUSCULAR; INTRAVENOUS; SUBCUTANEOUS at 13:36

## 2024-08-05 RX ADMIN — POTASSIUM CHLORIDE 40 MEQ: 1500 TABLET, EXTENDED RELEASE ORAL at 09:08

## 2024-08-05 RX ADMIN — MUPIROCIN: 20 OINTMENT TOPICAL at 09:10

## 2024-08-05 RX ADMIN — VANCOMYCIN HYDROCHLORIDE 125 MG: 1 INJECTION, POWDER, LYOPHILIZED, FOR SOLUTION INTRAVENOUS at 09:08

## 2024-08-05 RX ADMIN — IPRATROPIUM BROMIDE AND ALBUTEROL SULFATE 1 DOSE: .5; 3 SOLUTION RESPIRATORY (INHALATION) at 13:57

## 2024-08-05 RX ADMIN — AMPICILLIN 2000 MG: 2 INJECTION, POWDER, FOR SOLUTION INTRAMUSCULAR; INTRAVENOUS at 05:04

## 2024-08-05 RX ADMIN — FINASTERIDE 5 MG: 5 TABLET, FILM COATED ORAL at 09:15

## 2024-08-05 RX ADMIN — AMPICILLIN 2000 MG: 2 INJECTION, POWDER, FOR SOLUTION INTRAMUSCULAR; INTRAVENOUS at 09:22

## 2024-08-05 RX ADMIN — AMPICILLIN 2000 MG: 2 INJECTION, POWDER, FOR SOLUTION INTRAMUSCULAR; INTRAVENOUS at 20:22

## 2024-08-05 RX ADMIN — SODIUM CHLORIDE, PRESERVATIVE FREE 10 ML: 5 INJECTION INTRAVENOUS at 09:06

## 2024-08-05 RX ADMIN — FUROSEMIDE 40 MG: 10 INJECTION, SOLUTION INTRAMUSCULAR; INTRAVENOUS at 16:56

## 2024-08-05 ASSESSMENT — PAIN SCALES - GENERAL
PAINLEVEL_OUTOF10: 0

## 2024-08-05 NOTE — PLAN OF CARE
Problem: Safety - Adult  Goal: Free from fall injury  Outcome: Progressing     Problem: Pain  Goal: Verbalizes/displays adequate comfort level or baseline comfort level  Outcome: Progressing     Problem: Discharge Planning  Goal: Discharge to home or other facility with appropriate resources  Outcome: Progressing  Flowsheets (Taken 8/5/2024 0800)  Discharge to home or other facility with appropriate resources: Identify barriers to discharge with patient and caregiver     Problem: Respiratory - Adult  Goal: Achieves optimal ventilation and oxygenation  Outcome: Progressing     Problem: Cardiovascular - Adult  Goal: Maintains optimal cardiac output and hemodynamic stability  Outcome: Progressing  Goal: Absence of cardiac dysrhythmias or at baseline  Outcome: Progressing     Problem: Skin/Tissue Integrity - Adult  Goal: Skin integrity remains intact  Outcome: Progressing  Flowsheets (Taken 8/5/2024 0800)  Skin Integrity Remains Intact: Monitor for areas of redness and/or skin breakdown  Goal: Incisions, wounds, or drain sites healing without S/S of infection  Outcome: Progressing  Flowsheets (Taken 8/5/2024 0800)  Incisions, Wounds, or Drain Sites Healing Without Sign and Symptoms of Infection: ADMISSION and DAILY: Assess and document risk factors for pressure ulcer development  Goal: Oral mucous membranes remain intact  Outcome: Progressing  Flowsheets (Taken 8/5/2024 0800)  Oral Mucous Membranes Remain Intact: Assess oral mucosa and hygiene practices     Problem: Hematologic - Adult  Goal: Maintains hematologic stability  Outcome: Progressing  Flowsheets (Taken 8/5/2024 0800)  Maintains hematologic stability: Assess for signs and symptoms of bleeding or hemorrhage     Problem: Skin/Tissue Integrity  Goal: Absence of new skin breakdown  Description: 1.  Monitor for areas of redness and/or skin breakdown  2.  Assess vascular access sites hourly  3.  Every 4-6 hours minimum:  Change oxygen saturation probe site  4.   Every 4-6 hours:  If on nasal continuous positive airway pressure, respiratory therapy assess nares and determine need for appliance change or resting period.  Outcome: Progressing     Problem: Chronic Conditions and Co-morbidities  Goal: Patient's chronic conditions and co-morbidity symptoms are monitored and maintained or improved  Outcome: Progressing  Flowsheets (Taken 8/5/2024 0800)  Care Plan - Patient's Chronic Conditions and Co-Morbidity Symptoms are Monitored and Maintained or Improved: Monitor and assess patient's chronic conditions and comorbid symptoms for stability, deterioration, or improvement

## 2024-08-05 NOTE — PROGRESS NOTES
[facility]  Progress Note    Patient: Max Aly Age: 74 y.o. : 1950 MR#: [id number] SSN: [id number]  Date/Time: 2024     C/C: Increasing cough      Subjective:   HPI : 74-year-old male past medical history of paroxysmal atrial fibrillation, diabetes mellitus type 2, hepatitis C, BPH, morbid obesity, sleep apnea admitted with increasing shortness of breath and possibility of acute blood loss anemia,    Echocardiogram done as a part of cardiology evaluation for CHF suggested patient has vegetation over aortic valve, later diagnosis Enterococcus endocarditis plan for possible cardiothoracic surgery and intervention if blood culture remains negative.      Review of Systems:  positive responses in bold type   Constitutional: Negative for fever, chills, diaphoresis and unexpected weight change.   HENT: Negative for ear pain, congestion, sore throat, rhinorrhea, drooling, trouble swallowing, neck pain and tinnitus.   Eyes: Negative for photophobia, pain, redness and visual disturbance.   Respiratory: negative for shortness of breath, cough, choking, chest tightness, wheezing or stridor.   Cardiovascular: Negative for chest pain, palpitations and leg swelling.   Gastrointestinal: Negative for nausea, vomiting, abdominal pain, diarrhea, constipation, blood in stool, abdominal distention and anal bleeding.   Genitourinary: Negative for dysuria, urgency, frequency, hematuria, flank pain and difficulty urinating.   Musculoskeletal: Negative for back pain and arthralgias.   Skin: Negative for color change, rash and wound.   Neurological: Negative for dizziness, seizures, syncope, speech difficulty, light-headedness or headaches.   Hematological: Does not bruise/bleed easily.   Psychiatric/Behavioral: Negative for suicidal ideas, hallucinations, behavioral problems, self-injury or agitation     Assessment/Plan:     1.  Acute dyspnea-improved  2 Enterococcus endocarditis

## 2024-08-05 NOTE — PROGRESS NOTES
Bedside and Verbal shift change report given to Kari RN (oncoming nurse) by Hoda RN (offgoing nurse). Report included the following information Nurse Handoff Report, Intake/Output, MAR, Recent Results, and Cardiac Rhythm NSR w/ 1st degree AV block .

## 2024-08-05 NOTE — PROGRESS NOTES
Infectious Disease progress Note    Reason: Evaluate for infective endocarditis    Current abx Prior abx   Ampicillin 2 grams q 4 hours (5 Aug -   CTX 2 grams q 12 hours (5 Aug -  Daptomycin (3 Aug - 5)     Lines:       Assessment :  80-year-old man with past medical history significant for paroxysmal atrial fibrillation, type 2 diabetes, hepatitis C, BPH, obesity, sleep apnea, C. difficile colitis-positive PCR October 2023 admitted to [facility] on 7/26/2024 for SOB.     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 Vegetations noted on both TTE and GRIFFIN (7/30, 7/31), respectively.     No new ID recommendations. He was transitioned to ampicillin + CTX yesterday, after his Enterococcus susceptibilities returned PCN susceptible. He will need approximately 6 week total therapy for Enterococcal endocarditis.       Recommendations:  -- Continue ampicillin + ceftriaxone, will need total 6 week course for infective endocarditis  -- With presence of cardiac implantable device and valvular vegetation on GRIFFIN, patient does meet criteria for CIED systemic infection. If possible, would recommend removal of the CIED device  --- Reimplantation would be reasonable after surveillance cultures following device removal is negative for 2 weeks  -- Follow up cardiothoracic surgery recommendations regarding timing of aortic valve replacement  -- Follow-up GI recommendations  -- With history of C diff colitis, will start PO vancomycin 125 mg daily for secondary prevention  -- F/u culture results    ID will see patient on Wednesday, unless any new clinical changes.     Please call me if any further questions or concerns. Will continue to participate in the care of this patient.    HPI:  Seen at bedside today. He overall is doing ok. No acute concerns or complaints. Main issue is dry mouth, otherwise no abdominal pain, CP, SOB.     Past

## 2024-08-06 LAB
GLUCOSE BLD STRIP.AUTO-MCNC: 136 MG/DL (ref 70–110)
GLUCOSE BLD STRIP.AUTO-MCNC: 155 MG/DL (ref 70–110)
GLUCOSE BLD STRIP.AUTO-MCNC: 98 MG/DL (ref 70–110)
GLUCOSE BLD STRIP.AUTO-MCNC: 99 MG/DL (ref 70–110)

## 2024-08-06 PROCEDURE — 6360000002 HC RX W HCPCS: Performed by: INTERNAL MEDICINE

## 2024-08-06 PROCEDURE — 2580000003 HC RX 258: Performed by: INTERNAL MEDICINE

## 2024-08-06 PROCEDURE — 82962 GLUCOSE BLOOD TEST: CPT

## 2024-08-06 PROCEDURE — 6370000000 HC RX 637 (ALT 250 FOR IP): Performed by: STUDENT IN AN ORGANIZED HEALTH CARE EDUCATION/TRAINING PROGRAM

## 2024-08-06 PROCEDURE — 99232 SBSQ HOSP IP/OBS MODERATE 35: CPT | Performed by: INTERNAL MEDICINE

## 2024-08-06 PROCEDURE — 2700000000 HC OXYGEN THERAPY PER DAY

## 2024-08-06 PROCEDURE — 6370000000 HC RX 637 (ALT 250 FOR IP): Performed by: INTERNAL MEDICINE

## 2024-08-06 PROCEDURE — 94761 N-INVAS EAR/PLS OXIMETRY MLT: CPT

## 2024-08-06 PROCEDURE — 2140000001 HC CVICU INTERMEDIATE R&B

## 2024-08-06 RX ADMIN — AMPICILLIN 2000 MG: 2 INJECTION, POWDER, FOR SOLUTION INTRAMUSCULAR; INTRAVENOUS at 12:14

## 2024-08-06 RX ADMIN — AMPICILLIN 2000 MG: 2 INJECTION, POWDER, FOR SOLUTION INTRAMUSCULAR; INTRAVENOUS at 20:37

## 2024-08-06 RX ADMIN — SODIUM CHLORIDE, PRESERVATIVE FREE 40 MG: 5 INJECTION INTRAVENOUS at 04:06

## 2024-08-06 RX ADMIN — AMPICILLIN 2000 MG: 2 INJECTION, POWDER, FOR SOLUTION INTRAMUSCULAR; INTRAVENOUS at 23:54

## 2024-08-06 RX ADMIN — VANCOMYCIN HYDROCHLORIDE 125 MG: 1 INJECTION, POWDER, LYOPHILIZED, FOR SOLUTION INTRAVENOUS at 07:57

## 2024-08-06 RX ADMIN — SODIUM CHLORIDE, PRESERVATIVE FREE 40 MG: 5 INJECTION INTRAVENOUS at 16:48

## 2024-08-06 RX ADMIN — WATER 2000 MG: 1 INJECTION INTRAMUSCULAR; INTRAVENOUS; SUBCUTANEOUS at 00:31

## 2024-08-06 RX ADMIN — SODIUM CHLORIDE, PRESERVATIVE FREE 10 ML: 5 INJECTION INTRAVENOUS at 07:51

## 2024-08-06 RX ADMIN — INSULIN GLARGINE 20 UNITS: 100 INJECTION, SOLUTION SUBCUTANEOUS at 20:37

## 2024-08-06 RX ADMIN — SODIUM CHLORIDE, PRESERVATIVE FREE 10 ML: 5 INJECTION INTRAVENOUS at 21:12

## 2024-08-06 RX ADMIN — AMPICILLIN 2000 MG: 2 INJECTION, POWDER, FOR SOLUTION INTRAMUSCULAR; INTRAVENOUS at 16:49

## 2024-08-06 RX ADMIN — TAMSULOSIN HYDROCHLORIDE 0.4 MG: 0.4 CAPSULE ORAL at 07:57

## 2024-08-06 RX ADMIN — CETIRIZINE HYDROCHLORIDE 10 MG: 10 TABLET, FILM COATED ORAL at 07:57

## 2024-08-06 RX ADMIN — FUROSEMIDE 40 MG: 10 INJECTION, SOLUTION INTRAMUSCULAR; INTRAVENOUS at 07:50

## 2024-08-06 RX ADMIN — AMPICILLIN 2000 MG: 2 INJECTION, POWDER, FOR SOLUTION INTRAMUSCULAR; INTRAVENOUS at 07:55

## 2024-08-06 RX ADMIN — WATER 2000 MG: 1 INJECTION INTRAMUSCULAR; INTRAVENOUS; SUBCUTANEOUS at 14:30

## 2024-08-06 RX ADMIN — AMPICILLIN 2000 MG: 2 INJECTION, POWDER, FOR SOLUTION INTRAMUSCULAR; INTRAVENOUS at 00:37

## 2024-08-06 RX ADMIN — FUROSEMIDE 40 MG: 10 INJECTION, SOLUTION INTRAMUSCULAR; INTRAVENOUS at 18:05

## 2024-08-06 RX ADMIN — FINASTERIDE 5 MG: 5 TABLET, FILM COATED ORAL at 07:57

## 2024-08-06 RX ADMIN — AMPICILLIN 2000 MG: 2 INJECTION, POWDER, FOR SOLUTION INTRAMUSCULAR; INTRAVENOUS at 04:10

## 2024-08-06 RX ADMIN — SODIUM CHLORIDE: 9 INJECTION, SOLUTION INTRAVENOUS at 07:51

## 2024-08-06 RX ADMIN — FLUTICASONE PROPIONATE 1 SPRAY: 50 SPRAY, METERED NASAL at 08:03

## 2024-08-06 ASSESSMENT — PAIN SCALES - GENERAL
PAINLEVEL_OUTOF10: 0

## 2024-08-06 NOTE — PROGRESS NOTES
Comprehensive Nutrition Assessment    Type and Reason for Visit:   [facility]    Nutrition Recommendations/Plan:   Recommend 4 gm carb, cardiac diet- high fiber  s/p GI consult  2. Monitor GI status, po diet initiation and tolerance, wts, labs.         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    Wt change: 22 lb (11%) x < 1 week - significant. Pt reports \fluid loiss  Wt Readings from Last 10 Encounters:   08/05/24 116.8 kg (257 lb 6.4 oz)   07/31/24 131.5 kg (290 lb)   03/05/24 136.1 kg (300 lb)   11/02/23 132.5 kg (292 lb)   06/28/23 (!) 154.2 kg (340 lb)   05/25/23 (!) 149.7 kg (330 lb)   05/04/23 (!) 158.8 kg (350 lb)     NFPE: unable to perform NFPE. Food Allergies: [facility]    Nutrition Assessment:       Mr Castaneda  is seen for LOS by RD. 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  5 sick sinus syndrome s/p PPM 5/20/2017  6 pulmonary embolism chronic-s/p IVC filter on Eliquis  7 left upper extremity chronic occlusive superficial vein thrombosis    Max was tolerating therapeutic diabetic cardiac diet with good oral intake %. NPO for GI consult.    Wt loss significant due to fluids loss- lasix given.BMI- 36,

## 2024-08-06 NOTE — PROGRESS NOTES
Occupational Therapy    Chart reviewed. RN ok'd pt for OT re-evaluation, however pt refuses, stating he may have surgery later today. RN states pt is not having surgery today. Pt made aware, but still refuses.   Will f/u later as schedule allows. Thank you.  Celestino Worrell, MS OTR/L

## 2024-08-06 NOTE — PROGRESS NOTES
[facility]  Progress Note    Patient: Max Aly Age: 74 y.o. : 1950 MR#: [id number] SSN: [id number]  Date/Time: 2024     C/C: Increasing cough      Subjective:   HPI : 74-year-old male past medical history of paroxysmal atrial fibrillation, diabetes mellitus type 2, hepatitis C, BPH, morbid obesity, sleep apnea admitted with increasing shortness of breath and possibility of acute blood loss anemia,    Echocardiogram done as a part of cardiology evaluation for CHF suggested patient has vegetation over aortic valve, later diagnosis Enterococcus endocarditis plan for possible cardiothoracic surgery and intervention if blood culture remains negative.      Review of Systems:  positive responses in bold type   Constitutional: Negative for fever, chills, diaphoresis and unexpected weight change.   HENT: Negative for ear pain, congestion, sore throat, rhinorrhea, drooling, trouble swallowing, neck pain and tinnitus.   Eyes: Negative for photophobia, pain, redness and visual disturbance.   Respiratory: negative for shortness of breath, cough, choking, chest tightness, wheezing or stridor.   Cardiovascular: Negative for chest pain, palpitations and leg swelling.   Gastrointestinal: Negative for nausea, vomiting, abdominal pain, diarrhea, constipation, blood in stool, abdominal distention and anal bleeding.   Genitourinary: Negative for dysuria, urgency, frequency, hematuria, flank pain and difficulty urinating.   Musculoskeletal: Negative for back pain and arthralgias.   Skin: Negative for color change, rash and wound.   Neurological: Negative for dizziness, seizures, syncope, speech difficulty, light-headedness or headaches.   Hematological: Does not bruise/bleed easily.   Psychiatric/Behavioral: Negative for suicidal ideas, hallucinations, behavioral problems, self-injury or agitation     Assessment/Plan:     1.  Acute dyspnea-improved  2 Enterococcus endocarditis  microspheres (DEFINITY) injection 2 mL  2 mL IntraVENous ONCE PRN    guaiFENesin-dextromethorphan (ROBITUSSIN DM) 100-10 MG/5ML syrup 5 mL  5 mL Oral Q4H PRN    sodium chloride flush 0.9 % injection 5-40 mL  5-40 mL IntraVENous 2 times per day    sodium chloride flush 0.9 % injection 5-40 mL  5-40 mL IntraVENous PRN    0.9 % sodium chloride infusion   IntraVENous PRN    ondansetron (ZOFRAN-ODT) disintegrating tablet 4 mg  4 mg Oral Q8H PRN    Or    ondansetron (ZOFRAN) injection 4 mg  4 mg IntraVENous Q6H PRN    acetaminophen (TYLENOL) tablet 650 mg  650 mg Oral Q6H PRN    Or    acetaminophen (TYLENOL) suppository 650 mg  650 mg Rectal Q6H PRN    pantoprazole (PROTONIX) 40 mg in sodium chloride (PF) 0.9 % 10 mL injection  40 mg IntraVENous Q12H    insulin lispro (HUMALOG,ADMELOG) injection vial 0-8 Units  0-8 Units SubCUTAneous TID WC    insulin lispro (HUMALOG,ADMELOG) injection vial 0-4 Units  0-4 Units SubCUTAneous Nightly    glucose chewable tablet 16 g  4 tablet Oral PRN    dextrose bolus 10% 125 mL  125 mL IntraVENous PRN    Or    dextrose bolus 10% 250 mL  250 mL IntraVENous PRN    glucagon (rDNA) injection 1 mg  1 mg SubCUTAneous PRN    dextrose 10 % infusion   IntraVENous Continuous PRN    mupirocin (BACTROBAN) 2 % ointment   Topical TID    vitamin D (ERGOCALCIFEROL) capsule 50,000 Units  50,000 Units Oral Weekly    traMADol (ULTRAM) tablet 25 mg  25 mg Oral TID PRN    tamsulosin (FLOMAX) capsule 0.4 mg  0.4 mg Oral Daily    fluticasone (FLONASE) 50 MCG/ACT nasal spray 1 spray  1 spray Each Nostril Daily    finasteride (PROSCAR) tablet 5 mg  5 mg Oral Daily    cetirizine (ZYRTEC) tablet 10 mg  10 mg Oral Daily    [Held by provider] dilTIAZem (CARDIZEM CD) extended release capsule 120 mg  120 mg Oral Daily    [Held by provider] atorvastatin (LIPITOR) tablet 40 mg  40 mg Oral Daily    insulin glargine (LANTUS) injection vial 20 Units  20 Units SubCUTAneous QHS       Labs:    Recent Labs     08/04/24  0713

## 2024-08-06 NOTE — PLAN OF CARE
Problem: Safety - Adult  Goal: Free from fall injury  Outcome: Progressing     Problem: Pain  Goal: Verbalizes/displays adequate comfort level or baseline comfort level  Outcome: Progressing     Problem: Discharge Planning  Goal: Discharge to home or other facility with appropriate resources  Outcome: Progressing     Problem: Respiratory - Adult  Goal: Achieves optimal ventilation and oxygenation  Outcome: Progressing     Problem: Cardiovascular - Adult  Goal: Absence of cardiac dysrhythmias or at baseline  Outcome: Progressing     Problem: Skin/Tissue Integrity - Adult  Goal: Skin integrity remains intact  Outcome: Progressing     Problem: Hematologic - Adult  Goal: Maintains hematologic stability  Outcome: Progressing     Problem: Skin/Tissue Integrity  Goal: Absence of new skin breakdown  Description: 1.  Monitor for areas of redness and/or skin breakdown  2.  Assess vascular access sites hourly  3.  Every 4-6 hours minimum:  Change oxygen saturation probe site  4.  Every 4-6 hours:  If on nasal continuous positive airway pressure, respiratory therapy assess nares and determine need for appliance change or resting period.  Outcome: Progressing     Problem: Chronic Conditions and Co-morbidities  Goal: Patient's chronic conditions and co-morbidity symptoms are monitored and maintained or improved  Outcome: Progressing

## 2024-08-06 NOTE — PROGRESS NOTES
Cardiology Progress Note    Admit Date: 7/26/2024  Attending Cardiologist: Dr. Rogers    IMPRESSION  Endocarditis of the aortic valve  Acute heart failure preserved ejection fraction, as observed by elevated NT proBNP greater than 3400, chest x-ray 7/26/2024 with cardiomegaly and moderate to severe vascular congestion, status post GRIFFIN showing moderate size aortic echodensity along eccentric severe AI  Severe aortic regurgitation, by transesophageal echocardiogram 7/31/2024  Elevated troponin 74 ng/L, in the context of supply/demand mismatch as above congestive heart failure, EKG 7/26/2024 normal sinus rhythm first-degree AV block with premature atrial complex minimal voltage criteria for LVH  Sick sinus syndrome with history of permanent pacemaker placement 5/2017 with generator change December 2020 chronic intermittent sensing of the atrial lead noise  History of pulmonary embolism 5/20/2024 with history of IVC filter on Eliquis  History of acute occlusive superficial vein thrombosis in left upper extremity cephalic vein of the upper arm  Hypertension - Normotensive to borderline blood pressure  Coronary risk equivalent diabetes mellitus  Hyperlipidemia  Obstructive sleep apnea on CPAP  History of Atrial fibrillation  Obesity     PLAN  Monitor fluid status, adjust as necessary, fluid restriction, salt intake <2g per day.  Continue On Lasix 40mg IV BID.    Recommend Guideline Directed medical therapy as can tolerate.  Monitor blood pressure, adjust antihypertensive medications as necessary.  Statin if can tolerate prior to discharge.  CT surgery recommendations noted with continuation of antibiotics recommended with aortic valve replacement at some point in time discussed again with the patient and verbalized understanding.    Spoke with infectious Disease Dr. Dominguez who will collaborate with Dr. Perez in collaboration with CT surgery for final decision on extraction of PM.  Anticoagulation currently held with [facility]

## 2024-08-07 LAB
ABO + RH BLD: NORMAL
ANION GAP SERPL CALC-SCNC: 6 MMOL/L (ref 3–18)
BLD PROD TYP BPU: NORMAL
BLOOD BANK DISPENSE STATUS: NORMAL
BLOOD GROUP ANTIBODIES SERPL: NORMAL
BPU ID: NORMAL
BUN SERPL-MCNC: 17 MG/DL (ref 7–18)
BUN/CREAT SERPL: 11 (ref 12–20)
CALCIUM SERPL-MCNC: 8.9 MG/DL (ref 8.5–10.1)
CALLED TO: NORMAL
CHLORIDE SERPL-SCNC: 111 MMOL/L (ref 100–111)
CK SERPL-CCNC: 29 U/L (ref 39–308)
CO2 SERPL-SCNC: 27 MMOL/L (ref 21–32)
CREAT SERPL-MCNC: 1.49 MG/DL (ref 0.6–1.3)
CROSSMATCH RESULT: NORMAL
GLUCOSE BLD STRIP.AUTO-MCNC: 125 MG/DL (ref 70–110)
GLUCOSE BLD STRIP.AUTO-MCNC: 154 MG/DL (ref 70–110)
GLUCOSE BLD STRIP.AUTO-MCNC: 187 MG/DL (ref 70–110)
GLUCOSE BLD STRIP.AUTO-MCNC: 226 MG/DL (ref 70–110)
GLUCOSE SERPL-MCNC: 148 MG/DL (ref 74–99)
MAGNESIUM SERPL-MCNC: 1.4 MG/DL (ref 1.6–2.6)
POTASSIUM SERPL-SCNC: 3.6 MMOL/L (ref 3.5–5.5)
SODIUM SERPL-SCNC: 144 MMOL/L (ref 136–145)
SPECIMEN EXP DATE BLD: NORMAL
UNIT DIVISION: 0

## 2024-08-07 PROCEDURE — 83735 ASSAY OF MAGNESIUM: CPT

## 2024-08-07 PROCEDURE — 82550 ASSAY OF CK (CPK): CPT

## 2024-08-07 PROCEDURE — 6360000002 HC RX W HCPCS: Performed by: INTERNAL MEDICINE

## 2024-08-07 PROCEDURE — 97535 SELF CARE MNGMENT TRAINING: CPT

## 2024-08-07 PROCEDURE — 2140000001 HC CVICU INTERMEDIATE R&B

## 2024-08-07 PROCEDURE — 6360000002 HC RX W HCPCS: Performed by: STUDENT IN AN ORGANIZED HEALTH CARE EDUCATION/TRAINING PROGRAM

## 2024-08-07 PROCEDURE — 80048 BASIC METABOLIC PNL TOTAL CA: CPT

## 2024-08-07 PROCEDURE — 2580000003 HC RX 258: Performed by: INTERNAL MEDICINE

## 2024-08-07 PROCEDURE — 6370000000 HC RX 637 (ALT 250 FOR IP): Performed by: STUDENT IN AN ORGANIZED HEALTH CARE EDUCATION/TRAINING PROGRAM

## 2024-08-07 PROCEDURE — 82962 GLUCOSE BLOOD TEST: CPT

## 2024-08-07 PROCEDURE — 99232 SBSQ HOSP IP/OBS MODERATE 35: CPT | Performed by: INTERNAL MEDICINE

## 2024-08-07 PROCEDURE — 97164 PT RE-EVAL EST PLAN CARE: CPT

## 2024-08-07 PROCEDURE — 97168 OT RE-EVAL EST PLAN CARE: CPT

## 2024-08-07 PROCEDURE — 2700000000 HC OXYGEN THERAPY PER DAY

## 2024-08-07 PROCEDURE — 97530 THERAPEUTIC ACTIVITIES: CPT

## 2024-08-07 PROCEDURE — 94761 N-INVAS EAR/PLS OXIMETRY MLT: CPT

## 2024-08-07 PROCEDURE — 6370000000 HC RX 637 (ALT 250 FOR IP): Performed by: INTERNAL MEDICINE

## 2024-08-07 RX ORDER — MAGNESIUM SULFATE 1 G/100ML
1000 INJECTION INTRAVENOUS ONCE
Status: COMPLETED | OUTPATIENT
Start: 2024-08-07 | End: 2024-08-07

## 2024-08-07 RX ADMIN — AMPICILLIN 2000 MG: 2 INJECTION, POWDER, FOR SOLUTION INTRAMUSCULAR; INTRAVENOUS at 03:20

## 2024-08-07 RX ADMIN — MAGNESIUM SULFATE HEPTAHYDRATE 2000 MG: 40 INJECTION, SOLUTION INTRAVENOUS at 01:14

## 2024-08-07 RX ADMIN — AMPICILLIN 2000 MG: 2 INJECTION, POWDER, FOR SOLUTION INTRAMUSCULAR; INTRAVENOUS at 12:05

## 2024-08-07 RX ADMIN — SODIUM CHLORIDE, PRESERVATIVE FREE 40 MG: 5 INJECTION INTRAVENOUS at 03:13

## 2024-08-07 RX ADMIN — WATER 2000 MG: 1 INJECTION INTRAMUSCULAR; INTRAVENOUS; SUBCUTANEOUS at 03:14

## 2024-08-07 RX ADMIN — WATER 2000 MG: 1 INJECTION INTRAMUSCULAR; INTRAVENOUS; SUBCUTANEOUS at 14:23

## 2024-08-07 RX ADMIN — SODIUM CHLORIDE: 9 INJECTION, SOLUTION INTRAVENOUS at 20:40

## 2024-08-07 RX ADMIN — FUROSEMIDE 40 MG: 10 INJECTION, SOLUTION INTRAMUSCULAR; INTRAVENOUS at 08:17

## 2024-08-07 RX ADMIN — SODIUM CHLORIDE, PRESERVATIVE FREE 10 ML: 5 INJECTION INTRAVENOUS at 08:17

## 2024-08-07 RX ADMIN — SODIUM CHLORIDE, PRESERVATIVE FREE 40 MG: 5 INJECTION INTRAVENOUS at 16:17

## 2024-08-07 RX ADMIN — AMPICILLIN 2000 MG: 2 INJECTION, POWDER, FOR SOLUTION INTRAMUSCULAR; INTRAVENOUS at 20:40

## 2024-08-07 RX ADMIN — SODIUM CHLORIDE, PRESERVATIVE FREE 10 ML: 5 INJECTION INTRAVENOUS at 21:00

## 2024-08-07 RX ADMIN — CETIRIZINE HYDROCHLORIDE 10 MG: 10 TABLET, FILM COATED ORAL at 08:19

## 2024-08-07 RX ADMIN — FLUTICASONE PROPIONATE 1 SPRAY: 50 SPRAY, METERED NASAL at 08:21

## 2024-08-07 RX ADMIN — TAMSULOSIN HYDROCHLORIDE 0.4 MG: 0.4 CAPSULE ORAL at 08:19

## 2024-08-07 RX ADMIN — AMPICILLIN 2000 MG: 2 INJECTION, POWDER, FOR SOLUTION INTRAMUSCULAR; INTRAVENOUS at 08:16

## 2024-08-07 RX ADMIN — INSULIN GLARGINE 20 UNITS: 100 INJECTION, SOLUTION SUBCUTANEOUS at 21:48

## 2024-08-07 RX ADMIN — FINASTERIDE 5 MG: 5 TABLET, FILM COATED ORAL at 08:19

## 2024-08-07 RX ADMIN — AMPICILLIN 2000 MG: 2 INJECTION, POWDER, FOR SOLUTION INTRAMUSCULAR; INTRAVENOUS at 16:17

## 2024-08-07 RX ADMIN — FUROSEMIDE 40 MG: 10 INJECTION, SOLUTION INTRAMUSCULAR; INTRAVENOUS at 17:52

## 2024-08-07 RX ADMIN — VANCOMYCIN HYDROCHLORIDE 125 MG: 1 INJECTION, POWDER, LYOPHILIZED, FOR SOLUTION INTRAVENOUS at 08:19

## 2024-08-07 RX ADMIN — AMPICILLIN 2000 MG: 2 INJECTION, POWDER, FOR SOLUTION INTRAMUSCULAR; INTRAVENOUS at 23:54

## 2024-08-07 RX ADMIN — MAGNESIUM SULFATE IN DEXTROSE 1000 MG: 10 INJECTION, SOLUTION INTRAVENOUS at 19:27

## 2024-08-07 RX ADMIN — MUPIROCIN: 20 OINTMENT TOPICAL at 21:49

## 2024-08-07 ASSESSMENT — PAIN SCALES - GENERAL
PAINLEVEL_OUTOF10: 0

## 2024-08-07 NOTE — PROGRESS NOTES
Bedside and Verbal shift change report given to Miriam Cruz RN (oncoming nurse) by Shannon RN (offgoing nurse). Report included the following information Nurse Handoff Report, Adult Overview, and Cardiac Rhythm A paced .

## 2024-08-07 NOTE — PLAN OF CARE
Problem: Safety - Adult  Goal: Free from fall injury  8/7/2024 0932 by Shannon Figueredo RN  Outcome: Progressing  8/7/2024 0528 by Kari Miller GN  Outcome: Progressing     Problem: Pain  Goal: Verbalizes/displays adequate comfort level or baseline comfort level  8/7/2024 0932 by Shannon Figueredo RN  Outcome: Progressing  8/7/2024 0528 by Kari Miller GN  Outcome: Progressing     Problem: Discharge Planning  Goal: Discharge to home or other facility with appropriate resources  8/7/2024 0932 by Shannon Figueredo RN  Outcome: Progressing  8/7/2024 0528 by Kari Miller GN  Outcome: Progressing     Problem: Respiratory - Adult  Goal: Achieves optimal ventilation and oxygenation  8/7/2024 0932 by Shannon Figueredo RN  Outcome: Progressing  8/7/2024 0528 by Kari Miller GN  Outcome: Progressing     Problem: Cardiovascular - Adult  Goal: Maintains optimal cardiac output and hemodynamic stability  8/7/2024 0932 by Shannon Figueredo RN  Outcome: Progressing  8/7/2024 0528 by Kari Miller GN  Outcome: Progressing  Goal: Absence of cardiac dysrhythmias or at baseline  8/7/2024 0932 by Shannon Figueredo RN  Outcome: Progressing  8/7/2024 0528 by Kari Miller GN  Outcome: Progressing     Problem: Skin/Tissue Integrity - Adult  Goal: Skin integrity remains intact  8/7/2024 0932 by Shannon Figueredo RN  Outcome: Progressing  8/7/2024 0528 by Kari Miller GN  Outcome: Progressing  Goal: Incisions, wounds, or drain sites healing without S/S of infection  8/7/2024 0528 by Kari Miller GN  Outcome: Progressing  Goal: Oral mucous membranes remain intact  8/7/2024 0528 by Kari Miller GN  Outcome: Progressing     Problem: Hematologic - Adult  Goal: Maintains hematologic stability  8/7/2024 0528 by Kari Miller GN  Outcome: Progressing     Problem: Skin/Tissue Integrity  Goal: Absence of new skin breakdown  Description: 1.  Monitor for areas of redness and/or skin breakdown  2.  Assess vascular  access sites hourly  3.  Every 4-6 hours minimum:  Change oxygen saturation probe site  4.  Every 4-6 hours:  If on nasal continuous positive airway pressure, respiratory therapy assess nares and determine need for appliance change or resting period.  8/7/2024 0932 by Shannon Figueredo RN  Outcome: Progressing  8/7/2024 0528 by Kari Miller GN  Outcome: Progressing     Problem: Chronic Conditions and Co-morbidities  Goal: Patient's chronic conditions and co-morbidity symptoms are monitored and maintained or improved  8/7/2024 0528 by Kari Miller GN  Outcome: Progressing

## 2024-08-07 NOTE — PROGRESS NOTES
Infectious Disease progress Note    Reason: Evaluate for infective endocarditis    Current abx Prior abx   Ampicillin 2 grams q 4 hours (5 Aug -   CTX 2 grams q 12 hours (5 Aug -  Daptomycin (3 Aug - 5)     Lines:       Assessment :  80-year-old man with past medical history significant for paroxysmal atrial fibrillation, type 2 diabetes, hepatitis C, BPH, obesity, sleep apnea, C. difficile colitis-positive PCR October 2023 admitted to [facility] on 7/26/2024 for SOB.     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 Vegetations noted on both TTE and GRIFFIN (7/30, 7/31), respectively.     Spoke with both CT surgery and Cardiology regarding recommendation for pacemaker removal. Based on AHA guidelines, with GRIFFIN evidence of endocarditis and positive Enterococcal bacteremia, the pacemaker preferably should be removed. However, discussed with CT surgery, and understand that removing the pacemaker could significantly increase the technical difficulties/challenges associated with the procedure, particularly with how long the pacemaker has been in place.     If pacemaker extraction is not feasible based on prohibitive risks, then the pacemaker can be retained and typically is combined with a long term suppressive antibiotic therapy vs close monitoring, following aggressive antibiotic therapy. Ultimately will be a risk/benefit discussion and shared decision making between the patient and treatment teams.    Dr. Perez will take over the ID service tomorrow; will defer final discussion to her and CT surgery/cardiology.        Recommendations:  -- Continue ampicillin + ceftriaxone, will need total 6 week course for infective endocarditis  -- Retention vs removal of CIED per above  -- Follow up cardiothoracic surgery recommendations regarding timing of aortic valve replacement  -- Follow-up GI recommendations  -- With history    This test has been authorized by the FDA under an Emergency Use Authorization (EUA) for use by authorized laboratories.   Fact sheet for Healthcare Providers:  https://www.fda.gov/media/354402/download  Fact sheet for Patients: https://www.fda.gov/media/753740/download     Methodology: Isothermal Nucleic Acid Amplification         Rapid influenza A/B antigens [[id number]] Collected: 07/26/24 2055    Order Status: Completed Specimen: Nasal Washing Updated: 07/26/24 2115     Influenza A Ag Negative        Comment: A negative result does not exclude influenza virus infection, seasonal or H1N1 due to suboptimal sensitivity. If influenza is circulating in your community, a diagnosis of influenza should be considered based on a patients clinical presentation and empiric antiviral treatment should be considered, if indicated.        Influenza B Ag Negative                   RADIOLOGY:    All available imaging studies/reports in [facility] for this admission were reviewed    High complexity decision making was performed during the evaluation of this patient at high risk for decompensation      Above mentioned total time spent on reviewing the case/medical record/data/notes/EMR/patient examination/documentation/coordinating care with nurse/consultants, exclusive of procedures with complex decision making performed and > 50% time spent in face to face evaluation.    Zach Dominguez DO MPH  Infectious Disease Specialist  August 7, 2024  10:10 AM

## 2024-08-07 NOTE — PLAN OF CARE
Problem: Occupational Therapy - Adult  Goal: By Discharge: Performs self-care activities at highest level of function for planned discharge setting.  See evaluation for individualized goals.  Description: Occupational Therapy Goals:  Initiated 7/30/2024, re-evaluated 8/7/2024, goals updated to be met within 7-10 days.    1.  Patient will perform grooming with CGA for balance in standing.  2.  Patient will perform bathing with minimal assistance.  3.  Patient will perform lower body dressing  with minimal assistance.  4.  Patient will perform toilet transfers with supervision.  5.  Patient will perform all aspects of toileting with supervision.  6.  Patient will participate in upper extremity therapeutic exercise/activities with modified independence for 8-10 minutes to increase strength/endurance for ADLs.    7.  Patient will utilize energy conservation techniques during functional activities with verbal and visual cues.    PLOF: Pt lives in a 1 story home with his wife, previously Lenin in all ADLs.     Outcome: Progressing  OCCUPATIONAL THERAPY RE-EVALUATION    Patient: Max Aly (74 y.o. male)  Date: 8/7/2024  Primary Diagnosis: GI bleed [K92.2]  Iron deficiency anemia due to chronic blood loss [D50.0]  Lower GI bleed [K92.2]  Acute exacerbation of chronic obstructive pulmonary disease (COPD) (HCC) [J44.1]  Procedure(s) (LRB):  ESOPHAGOGASTRODUODENOSCOPY (N/A) 9 Days Post-Op   Precautions: Fall Risk, Contact Precautions    ASSESSMENT :    Pt is in recliner upon entry, reports just finishing working with PT, however, agreeable to OT re-evaluation. Pt demos decreased endurance, and difficulty reaching fwd to his LB for all LB ADLs, requiring Mod-Max A. Pt required Mod A to std initially from recliner in prep for toilet txfr training, demos posterior lean in std requiring Mod verbal and tactile cues to correct and achieve midline. Pt was not able to remove one UE from the walker for ADL simulation initially,  associated with a discharge to the patient's home setting.    This AMPAC score should be considered in conjunction with interdisciplinary team recommendations to determine the most appropriate discharge setting. Patient's social support, diagnosis, medical stability, and prior level of function should also be taken into consideration.     SUBJECTIVE:   Patient stated “It feels good to be up.”    OBJECTIVE DATA SUMMARY:   Hospital course since last seen and reason for re-evaluation:Pt is due for OT re-evaluation. Pt appears to be benefiting from receiving OT services during this hospitalization stay. Pt is making progress towards goals, however, limited due to mult medical issues. Goals updated. OT will continue to follow the patient for further intervention during this hospitalization, in order to maximize ADL performance and overall functional independence.   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    Hyperlipidemia 05/04/2023    Hypertension 05/04/2023    ASHLEY on CPAP 05/04/2023    Paroxysmal atrial fibrillation (HCC) 05/04/2023     Past Surgical History:   Procedure Laterality Date    IR IVC FILTER PLACEMENT W IMAGING  5/5/2023    IR IVC FILTER PLACEMENT W IMAGING 5/5/2023 Bob Castañeda MD [facility] RAD ANGIO IR    SCROTAL SURGERY Left     UPPER GASTROINTESTINAL ENDOSCOPY N/A 7/29/2024    ESOPHAGOGASTRODUODENOSCOPY performed by Barb Lozano MD at [facility] ENDOSCOPY       Home Situation:   Social/Functional History  Lives With: Spouse  Type of Home: House  Home Layout: One level  Home Access: Stairs to enter with rails  Entrance Stairs - Number of Steps: 2  Entrance Stairs - Rails: Both  Bathroom Shower/Tub: Walk-in shower  Bathroom Toilet: Standard  Bathroom Equipment: Grab bars in  shower  Bathroom Accessibility: Accessible  Home Equipment: Walker - Rolling, Cane, Rollator  Has the patient had two or more falls in the past year or any fall with injury in the past year?: No  Receives Help From: Family  ADL Assistance: Independent  Homemaking Assistance: Independent  Homemaking Responsibilities: Yes  Ambulation Assistance: Independent  Transfer Assistance: Independent  Active : Yes  Mode of Transportation: Car  Education: Not Applicable  Occupation: Retired  Type of Occupation: Not Appicable  []  Right hand dominant   []  Left hand dominant    Cognitive/Behavioral Status:  Orientation  Overall Orientation Status: Within Normal Limits  Orientation Level: Oriented X4  Cognition  Overall Cognitive Status: WFL    Skin: visible skin intact  Edema: BLE    Vision/Perceptual:    Vision  Vision: Within Functional Limits         Coordination: BUE  Coordination: Generally decreased, functional        Balance:     Balance  Sitting: Intact  Standing: Impaired;With support  Standing - Static: Fair  Standing - Dynamic: Poor    Strength: BUE  Strength: Generally decreased, functional    Tone & Sensation: BUE  Tone: Normal  Sensation: Intact    Range of Motion: BUE  AROM: Generally decreased, functional       Functional Mobility and Transfers for ADLs:  Bed Mobility:  No  Transfers:   Transfer Training  Transfer Training: Yes  Interventions: Tactile cues;Verbal cues (cues for forward weight shift, gluteal activation)  Sit to Stand: Moderate assistance;Minimum assistance (Mod A on 1st attempt, Min A on 2nd)  Stand to Sit: Minimum assistance  Stand Pivot Transfers: Minimum assistance    ADL Assessment:      Feeding: Supervision  Grooming: Supervision  UE Bathing: Contact guard assistance  LE Bathing: Moderate assistance  UE Dressing: Contact guard assistance  LE Dressing: Moderate assistance  Toileting: Moderate assistance   ADL Intervention:  Max A for socks management with bending fwd method  CGA for gown

## 2024-08-07 NOTE — PROGRESS NOTES
No complaints  Afebrile, sinus rhythm, stable hemodynamics  Equal bilateral breath sounds, rales  +1 lower extremity edema  Stable creatinine and H&H  A/P  Patient has infective endocarditis of the aortic valve with severe aortic regurgitation.  Patient presented with acute heart failure volume overload.  Patient has improved clinically with medical treatment and blood cultures has been negative.  Patient has pacemaker placed more than 4 years ago.  There is no evidence on exam and there is an infection in the pocket of the pacemaker. Echocardiogram shows no vegetations on the leads. Patient had colitis the past 3 months and has been hospital hospitalized for that reason.  Less likely that the pacemaker was the source of the infection.  Seeding of the leads cannot be excluded.  PET CT scan or right white blood cell scan may be able to help in deciding whether the pacemaker needs come out before the operation.  Will discuss with infectious disease specialist.  Patient will need aortic valve replacement/aortic root repair or replacement.  The operation carries significant risk of mortality and morbidity due to morbid obesity, chronic illness and debilitation.  I explained all the above findings and the treatment plan.  The patient and his family.   Patient will need cardiac cath prior to the operation.

## 2024-08-07 NOTE — PLAN OF CARE
Problem: Safety - Adult  Goal: Free from fall injury  Outcome: Progressing     Problem: Pain  Goal: Verbalizes/displays adequate comfort level or baseline comfort level  Outcome: Progressing     Problem: Discharge Planning  Goal: Discharge to home or other facility with appropriate resources  Outcome: Progressing     Problem: Respiratory - Adult  Goal: Achieves optimal ventilation and oxygenation  Outcome: Progressing     Problem: Cardiovascular - Adult  Goal: Maintains optimal cardiac output and hemodynamic stability  Outcome: Progressing  Goal: Absence of cardiac dysrhythmias or at baseline  Outcome: Progressing     Problem: Skin/Tissue Integrity - Adult  Goal: Skin integrity remains intact  Outcome: Progressing  Goal: Incisions, wounds, or drain sites healing without S/S of infection  Outcome: Progressing  Goal: Oral mucous membranes remain intact  Outcome: Progressing     Problem: Hematologic - Adult  Goal: Maintains hematologic stability  Outcome: Progressing     Problem: Skin/Tissue Integrity  Goal: Absence of new skin breakdown  Description: 1.  Monitor for areas of redness and/or skin breakdown  2.  Assess vascular access sites hourly  3.  Every 4-6 hours minimum:  Change oxygen saturation probe site  4.  Every 4-6 hours:  If on nasal continuous positive airway pressure, respiratory therapy assess nares and determine need for appliance change or resting period.  Outcome: Progressing     Problem: Chronic Conditions and Co-morbidities  Goal: Patient's chronic conditions and co-morbidity symptoms are monitored and maintained or improved  Outcome: Progressing

## 2024-08-07 NOTE — PLAN OF CARE
setting. Patient's social support, diagnosis, medical stability, and prior level of function should also be taken into consideration.     SUBJECTIVE:   Patient stated “I want to get out of bed.”    OBJECTIVE DATA SUMMARY:   Hospital course since last seen and reason for re-evaluation: S/p prolonged hospitalization with continued deficits in LE strength, balance, activity tolerance, and functional mobility.  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    Hyperlipidemia 05/04/2023    Hypertension 05/04/2023    ASHLEY on CPAP 05/04/2023    Paroxysmal atrial fibrillation (HCC) 05/04/2023     Past Surgical History:   Procedure Laterality Date    IR IVC FILTER PLACEMENT W IMAGING  5/5/2023    IR IVC FILTER PLACEMENT W IMAGING 5/5/2023 Bob Castañeda MD [facility] RAD ANGIO IR    SCROTAL SURGERY Left     UPPER GASTROINTESTINAL ENDOSCOPY N/A 7/29/2024    ESOPHAGOGASTRODUODENOSCOPY performed by Barb Lozano MD at [facility] ENDOSCOPY       Home Situation:  Social/Functional History  Lives With: Spouse  Type of Home: House  Home Layout: One level  Home Access: Stairs to enter with rails  Entrance Stairs - Number of Steps: 2  Entrance Stairs - Rails: Both  Bathroom Shower/Tub: Walk-in shower  Bathroom Toilet: Standard  Bathroom Equipment: Grab bars in shower  Bathroom Accessibility: Accessible  Home Equipment: Walker - Rolling, Cane, Rollator  Has the patient had two or more falls in the past year or any fall with injury in the past year?: No  Receives Help From: Family  ADL Assistance: Independent  Homemaking Assistance: Independent  Homemaking Responsibilities: Yes  Ambulation Assistance: Independent  Transfer Assistance: Independent  Active : Yes  Mode of Transportation: Car  Education: Not  Applicable  Occupation: Retired  Type of Occupation: Not Appicable  Critical Behavior:  Orientation  Overall Orientation Status: Within Normal Limits  Orientation Level: Oriented X4  Cognition  Overall Cognitive Status: WFL    Strength:    Strength: Generally decreased, functional    Tone & Sensation:   Tone: Normal  Sensation: Intact    Range Of Motion:  AROM: Within functional limits       Functional Mobility:  Bed Mobility:     Bed Mobility Training  Supine to Sit: Stand-by assistance  Scooting: Stand-by assistance  Transfers:     Transfer Training  Transfer Training: Yes  Interventions: Tactile cues;Verbal cues (cues for forward weight shift, gluteal activation)  Sit to Stand: Minimum assistance  Stand to Sit: Minimum assistance  Stand Pivot Transfers: Minimum assistance  Balance:      Balance  Sitting: Intact  Standing: Impaired  Standing - Static: Fair;Constant support  Standing - Dynamic: Fair;Constant support    Ambulation/Gait Training:     Gait  Gait Training: Yes  Overall Level of Assistance: Minimum assistance  Distance (ft): 3 Feet  Assistive Device: Walker, rolling  Base of Support: Widened  Speed/Rosenda: Slow  Step Length: Right shortened;Left shortened  Gait Abnormalities: Decreased step clearance;Trunk sway increased        Pain:  Intensity Pre-treatment: 0/10   Intensity Post-treatment: 0/10  Scale: Numeric Rating Scale      Activity Tolerance:   Activity Tolerance: Patient limited by fatigue;Patient limited by endurance  Please refer to the flowsheet for vital signs taken during this treatment.    After treatment:   [x]         Patient left in no apparent distress sitting up in chair  []         Patient left in no apparent distress in bed  [x]         Call bell left within reach  [x]         Nursing notified  []         Caregiver present  []         Bed alarm activated  []         SCDs applied    COMMUNICATION/EDUCATION:   Patient Education  Education Given To: Patient  Education Provided: Plan  of Care;Transfer Training;Energy Conservation (importance of OOB mobility, detriments of bedrest)  Education Method: Verbal;Teach Back;Demonstration  Barriers to Learning: None  Education Outcome: Demonstrated understanding;Verbalized understanding    Thank you for this referral.  Dayana Saucedo, PT  Minutes: 21

## 2024-08-07 NOTE — PROGRESS NOTES
8/7/24  Pt appears alert and in no acute distress. Pt denies fever, SOB, chest pain, dyspnea, or fatigue. Pt has a blowing diastolic murmur heard loudest at the 2nd left ICS on listening to heart sounds. Pt abdomen is flat and denies abdominal pain. Does not comment to last bowel movement. Pt has no further questions with Medical student.

## 2024-08-07 NOTE — PROGRESS NOTES
cardiology, will transfuse 1 unit of PRBC as patient is symptomatic from his anemia, hemoglobin approximately around 8, this will help with his CHF  -Cardiology is increasing dose of Lasix  -Surgical intervention if blood culture remains negative  -Follow ID recommendation regarding antibiotics-currently on Daptomycin  -Continue current treatment per ID-now on ampicillin and ceftriaxone off daptomycin        Dispo Plan: 2024    Objective:       General:  Alert, cooperative, no acute distress   HEENT: No facial asymmetry, TIGIST Rahul, External ears - WNL    Cardiovascular: S1S2 - regular , No Murmur   Pulmonary: Equal expansion , No Use of accessory muscles , No Rales No Rhonchi    GI:  +BS in all four quadrants, soft, non-tender  Extremities:  No edema; 2+ dorsalis pedis pulses bilaterally  Neuro: Alert and oriented X 2.       DVT Prophylaxis:  []Lovenox  []Hep SQ  []SCDs  []Coumadin   []On Heparin gtt    [] Eliquis [] Xarelto     Vitals:         VS: BP (!) 110/54   Pulse 62   Temp 97.8 °F (36.6 °C) (Oral)   Resp 18   Ht 1.778 m (5' 10\")   Wt 122.3 kg (269 lb 11.2 oz)   SpO2 99%   BMI 38.70 kg/m²    Tmax/24hrs: Temp (24hrs), Av.9 °F (36.6 °C), Min:97.5 °F (36.4 °C), Max:98.4 °F (36.9 °C)        Medications:   Current Facility-Administered Medications   Medication Dose Route Frequency    cefTRIAXone (ROCEPHIN) 2,000 mg in sterile water 20 mL IV syringe  2,000 mg IntraVENous Q12H    ampicillin (OMNIPEN) 2,000 mg in sodium chloride 0.9 % 100 mL IVPB (mini-bag)  2,000 mg IntraVENous 6 times per day    furosemide (LASIX) injection 40 mg  40 mg IntraVENous BID    vancomycin (VANCOCIN) 50 mg/mL oral solution 125 mg  125 mg Oral Daily    ipratropium 0.5 mg-albuterol 2.5 mg (DUONEB) nebulizer solution 1 Dose  1 Dose Inhalation Q4H PRN    melatonin disintegrating tablet 5 mg  5 mg Oral Nightly PRN    potassium chloride (KLOR-CON M) extended release tablet 40 mEq  40 mEq Oral PRN    Or    potassium bicarb-citric  acid (EFFER-K) effervescent tablet 40 mEq  40 mEq Oral PRN    Or    potassium chloride 10 mEq/100 mL IVPB (Peripheral Line)  10 mEq IntraVENous PRN    magnesium sulfate 2000 mg in 50 mL IVPB premix  2,000 mg IntraVENous PRN    sodium phosphate 15 mmol in sodium chloride 0.9 % 250 mL IVPB  15 mmol IntraVENous PRN    perflutren lipid microspheres (DEFINITY) injection 2 mL  2 mL IntraVENous ONCE PRN    guaiFENesin-dextromethorphan (ROBITUSSIN DM) 100-10 MG/5ML syrup 5 mL  5 mL Oral Q4H PRN    sodium chloride flush 0.9 % injection 5-40 mL  5-40 mL IntraVENous 2 times per day    sodium chloride flush 0.9 % injection 5-40 mL  5-40 mL IntraVENous PRN    0.9 % sodium chloride infusion   IntraVENous PRN    ondansetron (ZOFRAN-ODT) disintegrating tablet 4 mg  4 mg Oral Q8H PRN    Or    ondansetron (ZOFRAN) injection 4 mg  4 mg IntraVENous Q6H PRN    acetaminophen (TYLENOL) tablet 650 mg  650 mg Oral Q6H PRN    Or    acetaminophen (TYLENOL) suppository 650 mg  650 mg Rectal Q6H PRN    pantoprazole (PROTONIX) 40 mg in sodium chloride (PF) 0.9 % 10 mL injection  40 mg IntraVENous Q12H    insulin lispro (HUMALOG,ADMELOG) injection vial 0-8 Units  0-8 Units SubCUTAneous TID WC    insulin lispro (HUMALOG,ADMELOG) injection vial 0-4 Units  0-4 Units SubCUTAneous Nightly    glucose chewable tablet 16 g  4 tablet Oral PRN    dextrose bolus 10% 125 mL  125 mL IntraVENous PRN    Or    dextrose bolus 10% 250 mL  250 mL IntraVENous PRN    glucagon (rDNA) injection 1 mg  1 mg SubCUTAneous PRN    dextrose 10 % infusion   IntraVENous Continuous PRN    mupirocin (BACTROBAN) 2 % ointment   Topical TID    vitamin D (ERGOCALCIFEROL) capsule 50,000 Units  50,000 Units Oral Weekly    traMADol (ULTRAM) tablet 25 mg  25 mg Oral TID PRN    tamsulosin (FLOMAX) capsule 0.4 mg  0.4 mg Oral Daily    fluticasone (FLONASE) 50 MCG/ACT nasal spray 1 spray  1 spray Each Nostril Daily    finasteride (PROSCAR) tablet 5 mg  5 mg Oral Daily    cetirizine (ZYRTEC)  tablet 10 mg  10 mg Oral Daily    [Held by provider] dilTIAZem (CARDIZEM CD) extended release capsule 120 mg  120 mg Oral Daily    [Held by provider] atorvastatin (LIPITOR) tablet 40 mg  40 mg Oral Daily    insulin glargine (LANTUS) injection vial 20 Units  20 Units SubCUTAneous QHS       Labs:    Recent Labs     08/05/24  0313   WBC 7.3   HGB 7.8*   HCT 26.4*        Recent Labs     08/05/24  0313 08/07/24  0029    144   K 3.7 3.6   * 111   CO2 27 27   BUN 20* 17   MG  --  1.4*   ALT 8*  --          Time spent on direct patient care >35 mints     Complexity : High complex - due to multiple medical issues outlined above.     CODE Status : Full CODE     Case discussed with:   [] Family  [x]Nursing  []Case Management         Disclaimer: Sections of this note are dictated utilizing voice recognition software, which may have resulted in some phonetic based errors in grammar and contents. Even though attempts were made to correct all the mistakes, some may have been missed, and remained in the body of the document. If questions arise, please contact our department.    Signed By: Magda Welsh MD     August 7, 2024

## 2024-08-07 NOTE — PROGRESS NOTES
Ot hasn't worn cpap wthe past two nights. Will try again tonight   08/06/24 2040   Assessment   Pulse 72   Respirations 22   SpO2 98 %   Using Accessory Muscles No   Mask Compliance Poor   Skin Assessment Clean, dry, & intact   Skin Protection for O2 Device No   Breath Sounds   Breath Sounds Bilateral Diminished   Right Upper Lobe Diminished   Right Middle Lobe Diminished   Right Lower Lobe Diminished   Left Upper Lobe Diminished   Left Lower Lobe Diminished   Settings/Measurements   EPAP Min 12 cmH2O   EPAP Max 16 cmH2O

## 2024-08-07 NOTE — PROGRESS NOTES
Cardiology Progress Note    Admit Date: 7/26/2024  Attending Cardiologist: Dr. Rogers    IMPRESSION  Endocarditis of the aortic valve  Acute heart failure preserved ejection fraction, as observed by elevated NT proBNP greater than 3400, chest x-ray 7/26/2024 with cardiomegaly and moderate to severe vascular congestion, status post GRIFFIN showing moderate size aortic echodensity along eccentric severe AI  Severe aortic regurgitation, by transesophageal echocardiogram 7/31/2024  Elevated troponin 74 ng/L, in the context of supply/demand mismatch as above congestive heart failure, EKG 7/26/2024 normal sinus rhythm first-degree AV block with premature atrial complex minimal voltage criteria for LVH  Sick sinus syndrome with history of permanent pacemaker placement 5/2017 with generator change December 2020 chronic intermittent sensing of the atrial lead noise  History of pulmonary embolism 5/20/2024 with history of IVC filter on Eliquis  History of acute occlusive superficial vein thrombosis in left upper extremity cephalic vein of the upper arm  Hypertension - Normotensive to borderline blood pressure  Coronary risk equivalent diabetes mellitus  Hyperlipidemia  Obstructive sleep apnea on CPAP  History of Atrial fibrillation  Obesity     PLAN  Monitor fluid status, adjust as necessary, fluid restriction, salt intake <2g per day.  Continue On Lasix 40mg IV change to daily from BID.    Recommend Guideline Directed medical therapy as can tolerate.  Monitor blood pressure, adjust antihypertensive medications as necessary.  Statin if can tolerate prior to discharge.  CT surgery recommendations noted with continuation of antibiotics recommended with aortic valve replacement at some point in time discussed again with the patient and verbalized understanding.    Spoke with infectious Disease Dr. Dominguez who will collaborate with Dr. Perez (ID) in collaboration with CT surgery for final decision on extraction of

## 2024-08-08 LAB
BACTERIA SPEC CULT: NORMAL
GLUCOSE BLD STRIP.AUTO-MCNC: 126 MG/DL (ref 70–110)
GLUCOSE BLD STRIP.AUTO-MCNC: 160 MG/DL (ref 70–110)
GLUCOSE BLD STRIP.AUTO-MCNC: 176 MG/DL (ref 70–110)
GLUCOSE BLD STRIP.AUTO-MCNC: 215 MG/DL (ref 70–110)
MAGNESIUM SERPL-MCNC: 2.3 MG/DL (ref 1.6–2.6)
SERVICE CMNT-IMP: NORMAL

## 2024-08-08 PROCEDURE — 6360000002 HC RX W HCPCS: Performed by: INTERNAL MEDICINE

## 2024-08-08 PROCEDURE — 6370000000 HC RX 637 (ALT 250 FOR IP): Performed by: STUDENT IN AN ORGANIZED HEALTH CARE EDUCATION/TRAINING PROGRAM

## 2024-08-08 PROCEDURE — 2580000003 HC RX 258: Performed by: INTERNAL MEDICINE

## 2024-08-08 PROCEDURE — 97530 THERAPEUTIC ACTIVITIES: CPT

## 2024-08-08 PROCEDURE — 99232 SBSQ HOSP IP/OBS MODERATE 35: CPT | Performed by: INTERNAL MEDICINE

## 2024-08-08 PROCEDURE — 2140000001 HC CVICU INTERMEDIATE R&B

## 2024-08-08 PROCEDURE — 97535 SELF CARE MNGMENT TRAINING: CPT

## 2024-08-08 PROCEDURE — 83735 ASSAY OF MAGNESIUM: CPT

## 2024-08-08 PROCEDURE — 6370000000 HC RX 637 (ALT 250 FOR IP): Performed by: INTERNAL MEDICINE

## 2024-08-08 PROCEDURE — 82962 GLUCOSE BLOOD TEST: CPT

## 2024-08-08 RX ORDER — FUROSEMIDE 10 MG/ML
40 INJECTION INTRAMUSCULAR; INTRAVENOUS DAILY
Status: DISCONTINUED | OUTPATIENT
Start: 2024-08-08 | End: 2024-08-09 | Stop reason: HOSPADM

## 2024-08-08 RX ADMIN — VANCOMYCIN HYDROCHLORIDE 125 MG: 1 INJECTION, POWDER, LYOPHILIZED, FOR SOLUTION INTRAVENOUS at 08:40

## 2024-08-08 RX ADMIN — SODIUM CHLORIDE, PRESERVATIVE FREE 10 ML: 5 INJECTION INTRAVENOUS at 08:40

## 2024-08-08 RX ADMIN — FLUTICASONE PROPIONATE 1 SPRAY: 50 SPRAY, METERED NASAL at 08:31

## 2024-08-08 RX ADMIN — AMPICILLIN 2000 MG: 2 INJECTION, POWDER, FOR SOLUTION INTRAMUSCULAR; INTRAVENOUS at 08:38

## 2024-08-08 RX ADMIN — INSULIN LISPRO 2 UNITS: 100 INJECTION, SOLUTION INTRAVENOUS; SUBCUTANEOUS at 17:32

## 2024-08-08 RX ADMIN — SODIUM CHLORIDE, PRESERVATIVE FREE 10 ML: 5 INJECTION INTRAVENOUS at 22:33

## 2024-08-08 RX ADMIN — APIXABAN 5 MG: 5 TABLET, FILM COATED ORAL at 21:53

## 2024-08-08 RX ADMIN — INSULIN GLARGINE 20 UNITS: 100 INJECTION, SOLUTION SUBCUTANEOUS at 21:46

## 2024-08-08 RX ADMIN — AMPICILLIN 2000 MG: 2 INJECTION, POWDER, FOR SOLUTION INTRAMUSCULAR; INTRAVENOUS at 11:59

## 2024-08-08 RX ADMIN — SODIUM CHLORIDE, PRESERVATIVE FREE 40 MG: 5 INJECTION INTRAVENOUS at 16:49

## 2024-08-08 RX ADMIN — CETIRIZINE HYDROCHLORIDE 10 MG: 10 TABLET, FILM COATED ORAL at 08:31

## 2024-08-08 RX ADMIN — GUAIFENESIN AND DEXTROMETHORPHAN 5 ML: 100; 10 SYRUP ORAL at 21:51

## 2024-08-08 RX ADMIN — APIXABAN 5 MG: 5 TABLET, FILM COATED ORAL at 11:55

## 2024-08-08 RX ADMIN — AMPICILLIN 2000 MG: 2 INJECTION, POWDER, FOR SOLUTION INTRAMUSCULAR; INTRAVENOUS at 23:55

## 2024-08-08 RX ADMIN — TAMSULOSIN HYDROCHLORIDE 0.4 MG: 0.4 CAPSULE ORAL at 08:31

## 2024-08-08 RX ADMIN — AMPICILLIN 2000 MG: 2 INJECTION, POWDER, FOR SOLUTION INTRAMUSCULAR; INTRAVENOUS at 21:42

## 2024-08-08 RX ADMIN — WATER 2000 MG: 1 INJECTION INTRAMUSCULAR; INTRAVENOUS; SUBCUTANEOUS at 16:49

## 2024-08-08 RX ADMIN — AMPICILLIN 2000 MG: 2 INJECTION, POWDER, FOR SOLUTION INTRAMUSCULAR; INTRAVENOUS at 17:01

## 2024-08-08 RX ADMIN — SODIUM CHLORIDE: 9 INJECTION, SOLUTION INTRAVENOUS at 21:42

## 2024-08-08 RX ADMIN — AMPICILLIN 2000 MG: 2 INJECTION, POWDER, FOR SOLUTION INTRAMUSCULAR; INTRAVENOUS at 04:23

## 2024-08-08 RX ADMIN — FUROSEMIDE 40 MG: 10 INJECTION, SOLUTION INTRAMUSCULAR; INTRAVENOUS at 08:30

## 2024-08-08 RX ADMIN — ACETAMINOPHEN 325MG 650 MG: 325 TABLET ORAL at 05:17

## 2024-08-08 RX ADMIN — FINASTERIDE 5 MG: 5 TABLET, FILM COATED ORAL at 08:31

## 2024-08-08 RX ADMIN — WATER 2000 MG: 1 INJECTION INTRAMUSCULAR; INTRAVENOUS; SUBCUTANEOUS at 04:15

## 2024-08-08 RX ADMIN — ACETAMINOPHEN 325MG 650 MG: 325 TABLET ORAL at 21:51

## 2024-08-08 RX ADMIN — SODIUM CHLORIDE, PRESERVATIVE FREE 40 MG: 5 INJECTION INTRAVENOUS at 04:29

## 2024-08-08 ASSESSMENT — PAIN SCALES - GENERAL
PAINLEVEL_OUTOF10: 0
PAINLEVEL_OUTOF10: 6
PAINLEVEL_OUTOF10: 0
PAINLEVEL_OUTOF10: 6

## 2024-08-08 ASSESSMENT — PAIN DESCRIPTION - ORIENTATION
ORIENTATION: RIGHT
ORIENTATION: RIGHT

## 2024-08-08 ASSESSMENT — PAIN DESCRIPTION - DESCRIPTORS
DESCRIPTORS: THROBBING
DESCRIPTORS: THROBBING

## 2024-08-08 ASSESSMENT — PAIN DESCRIPTION - LOCATION
LOCATION: WRIST
LOCATION: WRIST

## 2024-08-08 ASSESSMENT — ENCOUNTER SYMPTOMS: ANAL BLEEDING: 1

## 2024-08-08 NOTE — PROGRESS NOTES
[facility]  Progress Note    Patient: Max Aly Age: 74 y.o. : 1950 MR#: [id number] SSN: [id number]  Date/Time: 2024     C/C: Increasing cough      Subjective:   HPI : 74-year-old male past medical history of paroxysmal atrial fibrillation, diabetes mellitus type 2, hepatitis C, BPH, morbid obesity, sleep apnea admitted with increasing shortness of breath and possibility of acute blood loss anemia,    Echocardiogram done as a part of cardiology evaluation for CHF suggested patient has vegetation over aortic valve, later diagnosis Enterococcus endocarditis plan for possible cardiothoracic surgery and intervention if blood culture remains negative.      Review of Systems:  positive responses in bold type   Constitutional: Negative for fever, chills, diaphoresis and unexpected weight change.   HENT: Negative for ear pain, congestion, sore throat, rhinorrhea, drooling, trouble swallowing, neck pain and tinnitus.   Eyes: Negative for photophobia, pain, redness and visual disturbance.   Respiratory: negative for shortness of breath, cough, choking, chest tightness, wheezing or stridor.   Cardiovascular: Negative for chest pain, palpitations and leg swelling.   Gastrointestinal: Negative for nausea, vomiting, abdominal pain, diarrhea, constipation, blood in stool, abdominal distention and anal bleeding.   Genitourinary: Negative for dysuria, urgency, frequency, hematuria, flank pain and difficulty urinating.   Musculoskeletal: Negative for back pain and arthralgias.   Skin: Negative for color change, rash and wound.   Neurological: Negative for dizziness, seizures, syncope, speech difficulty, light-headedness or headaches.   Hematological: Does not bruise/bleed easily.   Psychiatric/Behavioral: Negative for suicidal ideas, hallucinations, behavioral problems, self-injury or agitation     Assessment/Plan:     1.  Acute dyspnea-improved  2 Enterococcus endocarditis  Oral BID    cefTRIAXone (ROCEPHIN) 2,000 mg in sterile water 20 mL IV syringe  2,000 mg IntraVENous Q12H    ampicillin (OMNIPEN) 2,000 mg in sodium chloride 0.9 % 100 mL IVPB (mini-bag)  2,000 mg IntraVENous 6 times per day    vancomycin (VANCOCIN) 50 mg/mL oral solution 125 mg  125 mg Oral Daily    ipratropium 0.5 mg-albuterol 2.5 mg (DUONEB) nebulizer solution 1 Dose  1 Dose Inhalation Q4H PRN    melatonin disintegrating tablet 5 mg  5 mg Oral Nightly PRN    potassium chloride (KLOR-CON M) extended release tablet 40 mEq  40 mEq Oral PRN    Or    potassium bicarb-citric acid (EFFER-K) effervescent tablet 40 mEq  40 mEq Oral PRN    Or    potassium chloride 10 mEq/100 mL IVPB (Peripheral Line)  10 mEq IntraVENous PRN    magnesium sulfate 2000 mg in 50 mL IVPB premix  2,000 mg IntraVENous PRN    sodium phosphate 15 mmol in sodium chloride 0.9 % 250 mL IVPB  15 mmol IntraVENous PRN    perflutren lipid microspheres (DEFINITY) injection 2 mL  2 mL IntraVENous ONCE PRN    guaiFENesin-dextromethorphan (ROBITUSSIN DM) 100-10 MG/5ML syrup 5 mL  5 mL Oral Q4H PRN    sodium chloride flush 0.9 % injection 5-40 mL  5-40 mL IntraVENous 2 times per day    sodium chloride flush 0.9 % injection 5-40 mL  5-40 mL IntraVENous PRN    0.9 % sodium chloride infusion   IntraVENous PRN    ondansetron (ZOFRAN-ODT) disintegrating tablet 4 mg  4 mg Oral Q8H PRN    Or    ondansetron (ZOFRAN) injection 4 mg  4 mg IntraVENous Q6H PRN    acetaminophen (TYLENOL) tablet 650 mg  650 mg Oral Q6H PRN    Or    acetaminophen (TYLENOL) suppository 650 mg  650 mg Rectal Q6H PRN    pantoprazole (PROTONIX) 40 mg in sodium chloride (PF) 0.9 % 10 mL injection  40 mg IntraVENous Q12H    insulin lispro (HUMALOG,ADMELOG) injection vial 0-8 Units  0-8 Units SubCUTAneous TID WC    insulin lispro (HUMALOG,ADMELOG) injection vial 0-4 Units  0-4 Units SubCUTAneous Nightly    glucose chewable tablet 16 g  4 tablet Oral PRN    dextrose bolus 10% 125 mL  125 mL

## 2024-08-08 NOTE — PLAN OF CARE
Problem: Safety - Adult  Goal: Free from fall injury  Outcome: Progressing     Problem: Pain  Goal: Verbalizes/displays adequate comfort level or baseline comfort level  Outcome: Progressing     Problem: Discharge Planning  Goal: Discharge to home or other facility with appropriate resources  Outcome: Progressing  Flowsheets (Taken 8/8/2024 0835)  Discharge to home or other facility with appropriate resources: Identify barriers to discharge with patient and caregiver     Problem: Respiratory - Adult  Goal: Achieves optimal ventilation and oxygenation  Outcome: Progressing  Flowsheets (Taken 8/8/2024 0835)  Achieves optimal ventilation and oxygenation:   Assess for changes in respiratory status   Assess for changes in mentation and behavior     Problem: Cardiovascular - Adult  Goal: Maintains optimal cardiac output and hemodynamic stability  Outcome: Progressing  Flowsheets (Taken 8/8/2024 0835)  Maintains optimal cardiac output and hemodynamic stability:   Monitor blood pressure and heart rate   Monitor urine output and notify Licensed Independent Practitioner for values outside of normal range  Goal: Absence of cardiac dysrhythmias or at baseline  Outcome: Progressing  Flowsheets (Taken 8/8/2024 0835)  Absence of cardiac dysrhythmias or at baseline: Monitor cardiac rate and rhythm     Problem: Skin/Tissue Integrity - Adult  Goal: Skin integrity remains intact  Outcome: Progressing  Flowsheets (Taken 8/8/2024 0835)  Skin Integrity Remains Intact: Monitor for areas of redness and/or skin breakdown  Goal: Incisions, wounds, or drain sites healing without S/S of infection  Outcome: Progressing  Flowsheets (Taken 8/8/2024 0835)  Incisions, Wounds, or Drain Sites Healing Without Sign and Symptoms of Infection: ADMISSION and DAILY: Assess and document risk factors for pressure ulcer development  Goal: Oral mucous membranes remain intact  Outcome: Progressing  Flowsheets (Taken 8/8/2024 0835)  Oral Mucous Membranes  no

## 2024-08-08 NOTE — PROGRESS NOTES
CT Surgery    Clinically stable no overt HF sxs.   Will need PPM removed prior to AVR.     Marilyn Tate PA-C  [facility]  [phone]

## 2024-08-08 NOTE — PROGRESS NOTES
Infectious Disease progress Note    Reason: Evaluate for infective endocarditis    Current abx Prior abx   Ampicillin 2 grams q 4 hours (5 Aug -   CTX 2 grams q 12 hours (5 Aug -  Daptomycin (3 Aug - 5)     Lines:       Assessment :  80-year-old man with past medical history significant for paroxysmal atrial fibrillation, type 2 diabetes, hepatitis C, BPH, obesity, sleep apnea, C. difficile colitis-positive PCR October 2023 admitted to [facility] on 7/26/2024 for SOB.     Currently admitted for aortic valve infective endocarditis due to ampicillin susceptible Enterococcus faecalis, with positive Van A/B mutation concerning for vancomycin resistance. Vegetations noted on both TTE and GRIFFIN (7/30, 7/31), respectively.  Positive blood culture 7/30, 7/31; negative blood culture 8/2    Spoke with both CT surgery and Cardiology regarding recommendation for pacemaker removal. Based on AHA guidelines, with GRIFFIN evidence of endocarditis and positive Enterococcal bacteremia, the pacemaker  should be removed since pacemaker is likely colonized due to recent high-grade bacteremia/aortic valve endocarditis    Clinically better      Recommendations:  -- Continue ampicillin + ceftriaxone IV  -- Recommend pacemaker removal prior to or during aortic valve replacement surgery - ( upon discussion iwht dr. Pendleton/dr. Bajwa, patient will need to be transferred to [facility] ER since PPM removal not feasible at [facility])  -- Follow up cardiothoracic surgery recommendations   -- Follow-up GI recommendations  -- With history of C diff colitis, will use PO vancomycin 125 mg daily for secondary prevention  -- Monitor clinically    Above plan was discussed in details with dr. Bajwa, dr. Welsh, Dr. Pendleton. please call me if any further questions or concerns. Will continue to participate in the care of this patient.    HPI:  Seen at bedside today. He doesn't have any new complaints or concerns. He overall feels ok.  Denies any N/V, abdominal pain or diarrhea.      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    Hyperlipidemia 05/04/2023    Hypertension 05/04/2023    ASHLEY on CPAP 05/04/2023    Paroxysmal atrial fibrillation (HCC) 05/04/2023       Past Surgical History:   Procedure Laterality Date    IR IVC FILTER PLACEMENT W IMAGING  5/5/2023    IR IVC FILTER PLACEMENT W IMAGING 5/5/2023 Bob Castañeda MD [facility] RAD ANGIO IR    SCROTAL SURGERY Left     UPPER GASTROINTESTINAL ENDOSCOPY N/A 7/29/2024    ESOPHAGOGASTRODUODENOSCOPY performed by Barb Lozano MD at [facility] ENDOSCOPY       [unfilled]    Current Facility-Administered Medications   Medication Dose Route Frequency    cefTRIAXone (ROCEPHIN) 2,000 mg in sterile water 20 mL IV syringe  2,000 mg IntraVENous Q12H    ampicillin (OMNIPEN) 2,000 mg in sodium chloride 0.9 % 100 mL IVPB (mini-bag)  2,000 mg IntraVENous 6 times per day    furosemide (LASIX) injection 40 mg  40 mg IntraVENous BID    vancomycin (VANCOCIN) 50 mg/mL oral solution 125 mg  125 mg Oral Daily    ipratropium 0.5 mg-albuterol 2.5 mg (DUONEB) nebulizer solution 1 Dose  1 Dose Inhalation Q4H PRN    melatonin disintegrating tablet 5 mg  5 mg Oral Nightly PRN    potassium chloride (KLOR-CON M) extended release tablet 40 mEq  40 mEq Oral PRN    Or    potassium bicarb-citric acid (EFFER-K) effervescent tablet 40 mEq  40 mEq Oral PRN    Or    potassium chloride 10 mEq/100 mL IVPB (Peripheral Line)  10 mEq IntraVENous PRN    magnesium sulfate 2000 mg in 50 mL IVPB premix  2,000 mg IntraVENous PRN    sodium phosphate 15 mmol in sodium chloride 0.9 % 250 mL IVPB  15 mmol IntraVENous PRN    perflutren lipid microspheres (DEFINITY) injection 2 mL  2 mL IntraVENous ONCE PRN    guaiFENesin-dextromethorphan (ROBITUSSIN DM) 100-10 MG/5ML syrup 5 mL   Not detected        Haemophilus Influenzae by PCR Not detected        Neisseria meningitidis by PCR Not detected        Pseudomonas aeruginosa Not detected        Stenotrophomonas maltophilia by PCR Not detected        Candida albicans by PCR Not detected        Candida auris by PCR Not detected        Candida glabrata Not detected        Candida krusei by PCR Not detected        Candida parapsilosis by PCR Not detected        Candida tropicalis by PCR Not detected        Cryptococcus neoformans/gattii by PCR Not detected        Resistant gene targets          Vancomycin resistance gene Detected        [employer] test comment       False positive results may rarely occur. Correlate with clinical,epidemiologic, and other laboratory findings           Comment: Please see BCID Interpretation Guide in EPIC Links       COVID-19, Rapid [[id number]] Collected: 07/26/24 2055    Order Status: Completed Specimen: Nasopharyngeal Updated: 07/26/24 2116     Source Nasopharyngeal        SARS-CoV-2, Rapid Not detected        Comment: Rapid Abbott ID Now     Rapid NAAT:  The specimen is NEGATIVE for SARS-CoV-2, the novel coronavirus associated with COVID-19.     Negative results should be treated as presumptive and, if inconsistent with clinical signs and symptoms or necessary for patient management, should be tested with an alternative molecular assay.  Negative results do not preclude SARS-CoV-2 infection and should not be used as the sole basis for patient management decisions.     This test has been authorized by the FDA under an Emergency Use Authorization (EUA) for use by authorized laboratories.   Fact sheet for Healthcare Providers:  https://www.fda.gov/media/427306/download  Fact sheet for Patients: https://www.fda.gov/media/964668/download     Methodology: Isothermal Nucleic Acid Amplification         Rapid influenza A/B antigens [[id number]] Collected: 07/26/24 2055    Order Status: Completed Specimen: Nasal Washing

## 2024-08-08 NOTE — PROGRESS NOTES
Bedside and Verbal shift change report given to Miriam Cruz RN (oncoming nurse) by Marce Ho RN (offgoing nurse). Report included the following information Nurse Handoff Report, Adult Overview, and Cardiac Rhythm NSR .

## 2024-08-08 NOTE — PROGRESS NOTES
0715- Bedside and Verbal shift change report given to Marce RN (oncoming nurse) by Miriam RN (offgoing nurse). Report included the following information SBAR, Intake/Output, MAR, Recent Results, and Cardiac Rhythm A Paced .     1900- Bedside and Verbal shift change report given to Miriam RN (oncoming nurse) by Marce RN (offgoing nurse). Report included the following information SBAR, Intake/Output, MAR, Recent Results, and Cardiac Rhythm A Paced.

## 2024-08-09 VITALS
WEIGHT: 269.7 LBS | TEMPERATURE: 98.3 F | HEART RATE: 76 BPM | DIASTOLIC BLOOD PRESSURE: 52 MMHG | HEIGHT: 70 IN | BODY MASS INDEX: 38.61 KG/M2 | OXYGEN SATURATION: 98 % | SYSTOLIC BLOOD PRESSURE: 115 MMHG | RESPIRATION RATE: 18 BRPM

## 2024-08-09 LAB
GLUCOSE BLD STRIP.AUTO-MCNC: 122 MG/DL (ref 70–110)
GLUCOSE BLD STRIP.AUTO-MCNC: 156 MG/DL (ref 70–110)

## 2024-08-09 PROCEDURE — 6370000000 HC RX 637 (ALT 250 FOR IP): Performed by: INTERNAL MEDICINE

## 2024-08-09 PROCEDURE — 2700000000 HC OXYGEN THERAPY PER DAY

## 2024-08-09 PROCEDURE — 6360000002 HC RX W HCPCS: Performed by: INTERNAL MEDICINE

## 2024-08-09 PROCEDURE — 2580000003 HC RX 258: Performed by: INTERNAL MEDICINE

## 2024-08-09 PROCEDURE — 94761 N-INVAS EAR/PLS OXIMETRY MLT: CPT

## 2024-08-09 PROCEDURE — 97535 SELF CARE MNGMENT TRAINING: CPT

## 2024-08-09 PROCEDURE — 82962 GLUCOSE BLOOD TEST: CPT

## 2024-08-09 PROCEDURE — 99239 HOSP IP/OBS DSCHRG MGMT >30: CPT | Performed by: INTERNAL MEDICINE

## 2024-08-09 RX ORDER — PANTOPRAZOLE SODIUM 40 MG/1
40 TABLET, DELAYED RELEASE ORAL
Qty: 60 TABLET | Refills: 0
Start: 2024-08-09

## 2024-08-09 RX ORDER — DILTIAZEM HYDROCHLORIDE 120 MG/1
120 CAPSULE, COATED, EXTENDED RELEASE ORAL DAILY
Qty: 30 CAPSULE | Refills: 3
Start: 2024-08-09

## 2024-08-09 RX ORDER — FUROSEMIDE 10 MG/ML
40 INJECTION INTRAMUSCULAR; INTRAVENOUS DAILY
Qty: 5 EACH | Refills: 0
Start: 2024-08-10

## 2024-08-09 RX ORDER — CETIRIZINE HYDROCHLORIDE 10 MG/1
10 TABLET ORAL DAILY
Qty: 10 TABLET | Refills: 0
Start: 2024-08-10

## 2024-08-09 RX ORDER — INSULIN GLARGINE 100 [IU]/ML
20 INJECTION, SOLUTION SUBCUTANEOUS
Qty: 10 ML | Refills: 3
Start: 2024-08-09

## 2024-08-09 RX ORDER — AMPICILLIN 500 MG/1
2000 INJECTION, POWDER, FOR SOLUTION INTRAMUSCULAR; INTRAVENOUS EVERY 4 HOURS
Qty: 1 EACH | Refills: 0 | Status: SHIPPED | OUTPATIENT
Start: 2024-08-09

## 2024-08-09 RX ORDER — INSULIN LISPRO 100 [IU]/ML
0-8 INJECTION, SOLUTION INTRAVENOUS; SUBCUTANEOUS
Qty: 1 EACH | Refills: 0
Start: 2024-08-09

## 2024-08-09 RX ORDER — CEFTRIAXONE 500 MG/1
2000 INJECTION, POWDER, FOR SOLUTION INTRAMUSCULAR; INTRAVENOUS ONCE
Qty: 4 EACH | Refills: 0
Start: 2024-08-09 | End: 2024-08-09

## 2024-08-09 RX ORDER — IPRATROPIUM BROMIDE AND ALBUTEROL SULFATE 2.5; .5 MG/3ML; MG/3ML
3 SOLUTION RESPIRATORY (INHALATION) EVERY 4 HOURS PRN
Qty: 50 EACH | Refills: 0
Start: 2024-08-09

## 2024-08-09 RX ADMIN — MUPIROCIN: 20 OINTMENT TOPICAL at 08:41

## 2024-08-09 RX ADMIN — AMPICILLIN 2000 MG: 2 INJECTION, POWDER, FOR SOLUTION INTRAMUSCULAR; INTRAVENOUS at 08:38

## 2024-08-09 RX ADMIN — SODIUM CHLORIDE, PRESERVATIVE FREE 40 MG: 5 INJECTION INTRAVENOUS at 03:38

## 2024-08-09 RX ADMIN — FINASTERIDE 5 MG: 5 TABLET, FILM COATED ORAL at 08:31

## 2024-08-09 RX ADMIN — SODIUM CHLORIDE, PRESERVATIVE FREE 10 ML: 5 INJECTION INTRAVENOUS at 13:44

## 2024-08-09 RX ADMIN — AMPICILLIN 2000 MG: 2 INJECTION, POWDER, FOR SOLUTION INTRAMUSCULAR; INTRAVENOUS at 03:42

## 2024-08-09 RX ADMIN — VANCOMYCIN HYDROCHLORIDE 125 MG: 1 INJECTION, POWDER, LYOPHILIZED, FOR SOLUTION INTRAVENOUS at 08:31

## 2024-08-09 RX ADMIN — WATER 2000 MG: 1 INJECTION INTRAMUSCULAR; INTRAVENOUS; SUBCUTANEOUS at 02:35

## 2024-08-09 RX ADMIN — MUPIROCIN: 20 OINTMENT TOPICAL at 00:00

## 2024-08-09 RX ADMIN — APIXABAN 5 MG: 5 TABLET, FILM COATED ORAL at 08:31

## 2024-08-09 RX ADMIN — CETIRIZINE HYDROCHLORIDE 10 MG: 10 TABLET, FILM COATED ORAL at 08:31

## 2024-08-09 RX ADMIN — FLUTICASONE PROPIONATE 1 SPRAY: 50 SPRAY, METERED NASAL at 08:41

## 2024-08-09 RX ADMIN — FUROSEMIDE 40 MG: 10 INJECTION, SOLUTION INTRAMUSCULAR; INTRAVENOUS at 08:31

## 2024-08-09 RX ADMIN — AMPICILLIN 2000 MG: 2 INJECTION, POWDER, FOR SOLUTION INTRAMUSCULAR; INTRAVENOUS at 12:30

## 2024-08-09 RX ADMIN — TAMSULOSIN HYDROCHLORIDE 0.4 MG: 0.4 CAPSULE ORAL at 08:31

## 2024-08-09 RX ADMIN — SODIUM CHLORIDE, PRESERVATIVE FREE 10 ML: 5 INJECTION INTRAVENOUS at 08:33

## 2024-08-09 RX ADMIN — MUPIROCIN: 20 OINTMENT TOPICAL at 13:46

## 2024-08-09 RX ADMIN — WATER 2000 MG: 1 INJECTION INTRAMUSCULAR; INTRAVENOUS; SUBCUTANEOUS at 13:44

## 2024-08-09 RX ADMIN — SODIUM CHLORIDE, PRESERVATIVE FREE 10 ML: 5 INJECTION INTRAVENOUS at 08:32

## 2024-08-09 ASSESSMENT — PAIN SCALES - GENERAL
PAINLEVEL_OUTOF10: 0

## 2024-08-09 NOTE — PROGRESS NOTES
Infectious Disease progress Note    Reason: Evaluate for infective endocarditis    Current abx Prior abx   Ampicillin 2 grams q 4 hours (5 Aug -   CTX 2 grams q 12 hours (5 Aug -  Daptomycin (3 Aug - 5)     Lines:       Assessment :  80-year-old man with past medical history significant for paroxysmal atrial fibrillation, type 2 diabetes, hepatitis C, BPH, obesity, sleep apnea, C. difficile colitis-positive PCR October 2023 admitted to [facility] on 7/26/2024 for SOB.     Currently admitted for aortic valve infective endocarditis due to ampicillin susceptible Enterococcus faecalis, with positive Van A/B mutation concerning for vancomycin resistance. Vegetations noted on both TTE and GRIFFIN (7/30, 7/31), respectively.  Positive blood culture 7/30, 7/31; negative blood culture 8/2    Spoke with both CT surgery and Cardiology regarding recommendation for pacemaker removal. Based on AHA guidelines, with GRIFFIN evidence of endocarditis and positive Enterococcal bacteremia, the pacemaker  should be removed since pacemaker is likely colonized due to recent high-grade bacteremia/aortic valve endocarditis    Clinically better      Recommendations:  -- Continue ampicillin + ceftriaxone IV  -- Recommend pacemaker removal prior to or during aortic valve replacement surgery - ( upon discussion iwht dr. Pendleton/dr. Bajwa, patient will need to be transferred to [facility] since PPM removal not feasible at [facility])  -- Follow up cardiothoracic surgery recommendations   -- Follow-up GI recommendations  -- With history of C diff colitis, will use PO vancomycin 125 mg daily for secondary prevention  -- Monitor clinically    Above plan was discussed in details with patient,  dr. Welsh. please call me if any further questions or concerns. Will continue to participate in the care of this patient.    HPI:   He doesn't have any new complaints or concerns. He overall feels ok.  Denies any N/V, abdominal pain or diarrhea.     Past Medical History:   Diagnosis  Not detected        Haemophilus Influenzae by PCR Not detected        Neisseria meningitidis by PCR Not detected        Pseudomonas aeruginosa Not detected        Stenotrophomonas maltophilia by PCR Not detected        Candida albicans by PCR Not detected        Candida auris by PCR Not detected        Candida glabrata Not detected        Candida krusei by PCR Not detected        Candida parapsilosis by PCR Not detected        Candida tropicalis by PCR Not detected        Cryptococcus neoformans/gattii by PCR Not detected        Resistant gene targets          Vancomycin resistance gene Detected        [employer] test comment       False positive results may rarely occur. Correlate with clinical,epidemiologic, and other laboratory findings           Comment: Please see BCID Interpretation Guide in EPIC Links       COVID-19, Rapid [[id number]] Collected: 07/26/24 2055    Order Status: Completed Specimen: Nasopharyngeal Updated: 07/26/24 2116     Source Nasopharyngeal        SARS-CoV-2, Rapid Not detected        Comment: Rapid Abbott ID Now     Rapid NAAT:  The specimen is NEGATIVE for SARS-CoV-2, the novel coronavirus associated with COVID-19.     Negative results should be treated as presumptive and, if inconsistent with clinical signs and symptoms or necessary for patient management, should be tested with an alternative molecular assay.  Negative results do not preclude SARS-CoV-2 infection and should not be used as the sole basis for patient management decisions.     This test has been authorized by the FDA under an Emergency Use Authorization (EUA) for use by authorized laboratories.   Fact sheet for Healthcare Providers:  https://www.fda.gov/media/998728/download  Fact sheet for Patients: https://www.fda.gov/media/308043/download     Methodology: Isothermal Nucleic Acid Amplification         Rapid influenza A/B antigens [[id number]] Collected: 07/26/24 2055    Order Status: Completed Specimen: Nasal Washing

## 2024-08-09 NOTE — CARE COORDINATION
08/09/24 1101   IMM Letter   IMM Letter given to Patient/Family/Significant other/Guardian/POA/by: Angelia Aviles   IMM Letter date given: 08/09/24   IMM Letter time given: 1101       Medicare pt has received, reviewed, and signed 2nd IM letter informing them of their right to appeal the discharge.  Signed copy has been placed on pt bedside chart.        PETERSON Sprague RN  Care Management

## 2024-08-09 NOTE — CARE COORDINATION
Chart reviewed.  Discussed transitional care plan with pt.  He stated he will like [facility] and if they cannot accept, then [facility].  [facility] already has [facility].    Sent message to Ava of [facility] to please review.          Angelia Aviles, KENYATTAN RN  Care Management

## 2024-08-09 NOTE — PLAN OF CARE
Problem: Occupational Therapy - Adult  Goal: By Discharge: Performs self-care activities at highest level of function for planned discharge setting.  See evaluation for individualized goals.  Description: Occupational Therapy Goals:  Initiated 7/30/2024, re-evaluated 8/7/2024, goals updated to be met within 7-10 days.    1.  Patient will perform grooming with CGA for balance in standing.  2.  Patient will perform bathing with minimal assistance.  3.  Patient will perform lower body dressing  with minimal assistance.  4.  Patient will perform toilet transfers with supervision.  5.  Patient will perform all aspects of toileting with supervision.  6.  Patient will participate in upper extremity therapeutic exercise/activities with modified independence for 8-10 minutes to increase strength/endurance for ADLs.    7.  Patient will utilize energy conservation techniques during functional activities with verbal and visual cues.    PLOF: Pt lives in a 1 story home with his wife, previously Lenin in all ADLs.     Outcome: Progressing   OCCUPATIONAL THERAPY TREATMENT    Patient: Max Aly (74 y.o. male)  Date: 8/9/2024  Diagnosis: GI bleed [K92.2]  Iron deficiency anemia due to chronic blood loss [D50.0]  Lower GI bleed [K92.2]  Acute exacerbation of chronic obstructive pulmonary disease (COPD) (HCC) [J44.1] Gastrointestinal bleeding  Procedure(s) (LRB):  ESOPHAGOGASTRODUODENOSCOPY (N/A) 11 Days Post-Op  Precautions: Fall Risk, Contact Precautions    Chart, occupational therapy assessment, plan of care, and goals were reviewed.  ASSESSMENT:  Pt is pleasant and cooperative. Flat affect. Requires increase time w/bed mobility to maneuver to EOB. Reviewed importance of hand placement and safety w/RW and functional transfer training. Provided vc's for cervical extension and trunk extension for improved posture and dynamic standing balance. Reviewed energy conservation techniques w/ADLs, demonstrating w/ADL grooming tasks at  chair level to increase activity tolerance. No c/o pain or SOB w/activity. HR 90s throughout session.    Progression toward goals:  []          Improving appropriately and progressing toward goals  [x]          Improving slowly and progressing toward goals  []          Not making progress toward goals and plan of care will be adjusted     PLAN:  Patient continues to benefit from skilled intervention to address the above impairments.  Continue treatment per established plan of care.    Further Equipment Recommendations for Discharge: shower chair and rolling walker    AMPAC: AM-PAC Inpatient Daily Activity Raw Score: 15    Current research shows that an AM-PAC score of 17 or less is not associated with a discharge to the patient's home setting and the patient demonstrates the potential endurance and/or tolerance for 3 hours of therapy each day.    This AMPAC score should be considered in conjunction with interdisciplinary team recommendations to determine the most appropriate discharge setting. Patient's social support, diagnosis, medical stability, and prior level of function should also be taken into consideration.     SUBJECTIVE:   Patient stated, \"I had my pacemaker put in at [facility].\"    OBJECTIVE DATA SUMMARY:   Cognitive/Behavioral Status:  Orientation  Orientation Level: Oriented X4    Functional Mobility and Transfers for ADLs:   Bed Mobility:  Bed Mobility Training  Bed Mobility Training: Yes  Supine to Sit: Stand-by assistance;Additional time  Scooting: Stand-by assistance     Transfers:  Transfer Training  Transfer Training: Yes  Stand to Sit: Minimum assistance  Bed to Chair: Minimum assistance w/RW    Balance:  Balance  Sitting: Intact  Standing: Impaired  Standing - Static: Fair  Standing - Dynamic: Fair    ADL Intervention:  Grooming: Setup  Face/hand hygiene and oral care at chair level    UE Therapeutic Exercises:   AROM BUE elbow flexion/extension, IR/ER    Pain:  Intensity Pre-treatment: 0/10    Intensity Post-treatment: 0/10    Activity Tolerance:    Good    After treatment:   [x]  Patient left in no apparent distress sitting up in chair  []  Patient left in no apparent distress in bed  [x]  Call bell left within reach  [x]  Nursing notified  []  Caregiver present  []  Bed alarm activated    COMMUNICATION/EDUCATION:   Patient Education  Education Provided: Plan of Care;Energy Conservation;Transfer Training  Education Method: Verbal;Teach Back  Barriers to Learning: None  Education Outcome: Continued education needed    Thank you for this referral.  ALF Daevy  Minutes: 25

## 2024-08-09 NOTE — PROGRESS NOTES
Physical Therapy  Pt not seen for skilled PT due to:    PT treatment session attempted; however, patient and RN reporting patient with plans to transfer to [place] at 1430. Unable to participate with PT services at this time as patient in progress to discharge.    Will f/u later as schedule allows. Thank you.  RICHARD LuoT

## 2024-08-09 NOTE — DISCHARGE SUMMARY
[facility]    Discharge Summary    Patient: Max Aly MRN: [id number]  CSN: [id number]    YOB: 1950  Age: 74 y.o.  Sex: male    DOA: 7/26/2024 LOS:  LOS: 13 days   Discharge Date:          Discharge Diagnoses:   1.  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  5 sick sinus syndrome s/p PPM 5/20/2017  6 pulmonary embolism chronic-s/p IVC filter on Eliquis  7 left upper extremity chronic occlusive superficial vein thrombosis  8 hypertension  9 diabetes mellitus type 2  10 ASHLEY-uses CPAP  11 morbid obesity with BMI of 41.61         Discharge Condition: Good    Discharge To: [facility]     Consults: CT surgery   Cardiology   ID         HPI: per Admitting MD \"     HISTORY OF PRESENT ILLNESS:   Max Aly is a 74 y.o. male with multiple comorbidities including paroxysmal atrial fibrillation, diabetes, hepatitis C, BPH, morbid obesity, sleep apnea who presents from [facility] with a diagnosis of dyspnea acute blood loss anemia secondary to GI bleed.  He presents with cough, bilateral ankle swelling and dyspnea which has been ongoing for 1 week.  He went to his cardiologist office on Wednesday and was advised to seek medical attention.    He was found to have a hemoglobin of 7.1.  Hemoccult stool was positive.  He is significantly short of breath on our conversation.  He was given Lasix 20 mg in [facility].       Hospital Course:    1 initially admitted for increasing shortness of breath and anemia with stool occult positive, patient has history of atrial fibrillation/history of PE s/p IVC filter on 5/20/2024, on Eliquis     2 Endocarditis of the aortic valve  Acute heart  taking these medications      Accu-Chek Guide strip  Generic drug: blood glucose test strips     amLODIPine 10 MG tablet  Commonly known as: NORVASC     amoxicillin-clavulanate 875-125 MG per tablet  Commonly known as: AUGMENTIN     famotidine 20 MG tablet  Commonly known as: PEPCID     fexofenadine 180 MG tablet  Commonly known as: ALLEGRA  Replaced by: cetirizine 10 MG tablet     fluticasone 50 MCG/ACT nasal spray  Commonly known as: FLONASE     glipiZIDE 5 MG extended release tablet  Commonly known as: GLUCOTROL XL     hydroCHLOROthiazide 25 MG tablet  Commonly known as: HYDRODIURIL     losartan 100 MG tablet  Commonly known as: COZAAR     metFORMIN 1000 MG tablet  Commonly known as: GLUCOPHAGE     mupirocin 2 % ointment  Commonly known as: BACTROBAN     Saw Palmetto 450 MG Caps     vitamin D 1.25 MG (96960 UT) Caps capsule  Commonly known as: ERGOCALCIFEROL               Where to Get Your Medications        Information about where to get these medications is not yet available    Ask your nurse or doctor about these medications  ampicillin 500 MG injection  cefTRIAXone 500 MG injection  cetirizine 10 MG tablet  dilTIAZem 120 MG extended release capsule  furosemide 10 MG/ML injection  insulin glargine 100 UNIT/ML injection vial  insulin lispro 100 UNIT/ML Soln injection vial  ipratropium 0.5 mg-albuterol 2.5 mg 0.5-2.5 (3) MG/3ML Soln nebulizer solution  pantoprazole 40 MG tablet  vancomycin 50 mg/mL oral solution         Activity: activity as tolerated    Diet: cardiac diet        Discharge time: >40 minutes spent coordinating this discharge    Magda Welsh MD   8/9/2024, 2:41 PM

## 2024-08-09 NOTE — PLAN OF CARE
Problem: Safety - Adult  Goal: Free from fall injury  Outcome: Progressing     Problem: Pain  Goal: Verbalizes/displays adequate comfort level or baseline comfort level  Outcome: Progressing     Problem: Discharge Planning  Goal: Discharge to home or other facility with appropriate resources  Outcome: Progressing     Problem: Respiratory - Adult  Goal: Achieves optimal ventilation and oxygenation  Outcome: Progressing     Problem: Cardiovascular - Adult  Goal: Maintains optimal cardiac output and hemodynamic stability  Outcome: Progressing  Goal: Absence of cardiac dysrhythmias or at baseline  Outcome: Progressing     Problem: Skin/Tissue Integrity - Adult  Goal: Skin integrity remains intact  Outcome: Progressing  Goal: Incisions, wounds, or drain sites healing without S/S of infection  Outcome: Progressing  Goal: Oral mucous membranes remain intact  Outcome: Progressing     Problem: Hematologic - Adult  Goal: Maintains hematologic stability  Outcome: Progressing     Problem: Skin/Tissue Integrity  Goal: Absence of new skin breakdown  Description: 1.  Monitor for areas of redness and/or skin breakdown  2.  Assess vascular access sites hourly  3.  Every 4-6 hours minimum:  Change oxygen saturation probe site  4.  Every 4-6 hours:  If on nasal continuous positive airway pressure, respiratory therapy assess nares and determine need for appliance change or resting period.  Outcome: Progressing     Problem: Chronic Conditions and Co-morbidities  Goal: Patient's chronic conditions and co-morbidity symptoms are monitored and maintained or improved  Outcome: Progressing     Problem: Occupational Therapy - Adult  Goal: By Discharge: Performs self-care activities at highest level of function for planned discharge setting.  See evaluation for individualized goals.  Description: Occupational Therapy Goals:  Initiated 7/30/2024, re-evaluated 8/7/2024, goals updated to be met within 7-10 days.    1.  Patient will perform

## 2024-08-09 NOTE — PROGRESS NOTES
Signs/Symptoms Outcomes: Meal Time Behavior, Weight, Biochemical Data, Fluid Status or Edema    Discharge Planning:    Continue current diet     Angeles Parada RD  Contact: Ext [phone], or via [employer]

## (undated) DEVICE — UNDERPAD INCONT W23XL36IN STD BLU POLYPR BK FLUF SFT

## (undated) DEVICE — BITE BLOCK ENDOSCP UNIV AD 6 TO 9.4 MM

## (undated) DEVICE — SYRINGE MED 50ML LUERSLIP TIP

## (undated) DEVICE — BASIN EMSIS 16OZ GRAPHITE PLAS KID SHP MOLD GRAD FOR ORAL

## (undated) DEVICE — MEDI-VAC NON-CONDUCTIVE SUCTION TUBING: Brand: CARDINAL HEALTH

## (undated) DEVICE — SOLUTION IRRIG 1000ML H2O STRL BLT

## (undated) DEVICE — LINER SUCT CANSTR 3000CC PLAS SFT PRE ASSEMB W/OUT TBNG W/

## (undated) DEVICE — CANNULA NSL AD TBNG L14FT STD PVC O2 CRV CONN NONFLARED NSL

## (undated) DEVICE — GAUZE,SPONGE,4"X4",16PLY,STRL,LF,10/TRAY: Brand: MEDLINE

## (undated) DEVICE — SYRINGE MED 25GA 3ML L5/8IN SUBQ PLAS W/ DETACH NDL SFTY

## (undated) DEVICE — YANKAUER,SMOOTH HANDLE,HIGH CAPACITY: Brand: MEDLINE INDUSTRIES, INC.

## (undated) DEVICE — FORCEPS BX L240CM JAW DIA2.4MM ORNG L CAP W/ NDL DISP RAD

## (undated) DEVICE — CATHETER SUCT TR FL TIP 14FR W/ O CTRL

## (undated) DEVICE — STERILE POLYISOPRENE POWDER-FREE SURGICAL GLOVES: Brand: PROTEXIS

## (undated) DEVICE — ENDOSCOPY PUMP TUBING/ CAP SET: Brand: ERBE